# Patient Record
Sex: MALE | Race: OTHER | HISPANIC OR LATINO
[De-identification: names, ages, dates, MRNs, and addresses within clinical notes are randomized per-mention and may not be internally consistent; named-entity substitution may affect disease eponyms.]

---

## 2017-10-26 ENCOUNTER — TRANSCRIPTION ENCOUNTER (OUTPATIENT)
Age: 27
End: 2017-10-26

## 2021-03-22 PROBLEM — Z00.00 ENCOUNTER FOR PREVENTIVE HEALTH EXAMINATION: Status: ACTIVE | Noted: 2021-03-22

## 2021-03-23 ENCOUNTER — APPOINTMENT (OUTPATIENT)
Dept: SURGERY | Facility: CLINIC | Age: 31
End: 2021-03-23

## 2023-02-27 ENCOUNTER — INPATIENT (INPATIENT)
Facility: HOSPITAL | Age: 33
LOS: 10 days | Discharge: LEFT BEFORE DISCHARGE | DRG: 20 | End: 2023-03-10
Attending: NEUROLOGICAL SURGERY | Admitting: NEUROLOGICAL SURGERY
Payer: COMMERCIAL

## 2023-02-27 VITALS
SYSTOLIC BLOOD PRESSURE: 122 MMHG | RESPIRATION RATE: 18 BRPM | HEART RATE: 72 BPM | OXYGEN SATURATION: 96 % | DIASTOLIC BLOOD PRESSURE: 60 MMHG

## 2023-02-27 DIAGNOSIS — I67.1 CEREBRAL ANEURYSM, NONRUPTURED: ICD-10-CM

## 2023-02-27 LAB
APTT BLD: 31.6 SEC — SIGNIFICANT CHANGE UP (ref 27.5–35.5)
HCT VFR BLD CALC: 42.3 % — SIGNIFICANT CHANGE UP (ref 39–50)
HGB BLD-MCNC: 14.6 G/DL — SIGNIFICANT CHANGE UP (ref 13–17)
INR BLD: 1.07 — SIGNIFICANT CHANGE UP (ref 0.88–1.16)
MCHC RBC-ENTMCNC: 31.5 PG — SIGNIFICANT CHANGE UP (ref 27–34)
MCHC RBC-ENTMCNC: 34.5 GM/DL — SIGNIFICANT CHANGE UP (ref 32–36)
MCV RBC AUTO: 91.4 FL — SIGNIFICANT CHANGE UP (ref 80–100)
NRBC # BLD: 0 /100 WBCS — SIGNIFICANT CHANGE UP (ref 0–0)
PLATELET # BLD AUTO: 227 K/UL — SIGNIFICANT CHANGE UP (ref 150–400)
PROTHROM AB SERPL-ACNC: 12.7 SEC — SIGNIFICANT CHANGE UP (ref 10.5–13.4)
RBC # BLD: 4.63 M/UL — SIGNIFICANT CHANGE UP (ref 4.2–5.8)
RBC # FLD: 14.3 % — SIGNIFICANT CHANGE UP (ref 10.3–14.5)
WBC # BLD: 12.21 K/UL — HIGH (ref 3.8–10.5)
WBC # FLD AUTO: 12.21 K/UL — HIGH (ref 3.8–10.5)

## 2023-02-27 PROCEDURE — 99222 1ST HOSP IP/OBS MODERATE 55: CPT

## 2023-02-27 PROCEDURE — 71045 X-RAY EXAM CHEST 1 VIEW: CPT | Mod: 26

## 2023-02-27 RX ORDER — ACETAMINOPHEN 500 MG
650 TABLET ORAL EVERY 6 HOURS
Refills: 0 | Status: DISCONTINUED | OUTPATIENT
Start: 2023-02-27 | End: 2023-03-02

## 2023-02-27 RX ORDER — ONDANSETRON 8 MG/1
4 TABLET, FILM COATED ORAL EVERY 6 HOURS
Refills: 0 | Status: DISCONTINUED | OUTPATIENT
Start: 2023-02-27 | End: 2023-03-10

## 2023-02-27 RX ORDER — SODIUM CHLORIDE 9 MG/ML
1000 INJECTION INTRAMUSCULAR; INTRAVENOUS; SUBCUTANEOUS
Refills: 0 | Status: DISCONTINUED | OUTPATIENT
Start: 2023-02-27 | End: 2023-02-28

## 2023-02-27 RX ORDER — SENNA PLUS 8.6 MG/1
2 TABLET ORAL AT BEDTIME
Refills: 0 | Status: DISCONTINUED | OUTPATIENT
Start: 2023-02-27 | End: 2023-03-10

## 2023-02-27 RX ADMIN — SENNA PLUS 2 TABLET(S): 8.6 TABLET ORAL at 23:59

## 2023-02-27 NOTE — H&P ADULT - HISTORY OF PRESENT ILLNESS
Patient is a 31yo M with PMHx of anxiety who originally presented to [facility] with severe headache, nausea with emesis x 1 and diaphoresis and was found to have a right 6mm ICA aneurysm on CTA. Patient reports that this morning he woke up with a severe headache that was accompanied by an episode of nausea and vomiting. He also endorses that the headache was so severe he became extremely anxious, felt as if he was having a panic attack and became diaphoretic. He states that headaches are controlled from the pain medicine he received while at [place] and did not have any additional episodes of nausea/vomiting. Pending further workup with repeat CTH/CTA and possible cerebral angiogram     Denies visual changes, dizziness, cigarette smoking, family hx of brain aneurysm, previous recurrent headaches, changes in sensation, mental status, unilateral weakness, confusion. Patient is a 33yo M with PMHx of anxiety who originally presented to [facility] with severe headache, nausea with emesis x 1 and diaphoresis and was found to have a right 6mm ICA aneurysm on CTA. Patient reports that this morning he woke up with a severe headache that was accompanied by an episode of nausea and vomiting. He also endorses that the headache was so severe he became extremely anxious, felt as if he was having a panic attack and became diaphoretic. He states that headaches are controlled from the pain medicine he received while at [place] and did not have any additional episodes of nausea/vomiting. Pending further workup with repeat CTH/CTA and possible cerebral angiogram     Denies visual changes, dizziness, cigarette smoking, family hx of brain aneurysm, previous recurrent headaches, changes in sensation, mental status, unilateral weakness, confusion.    GCS 15, MF0,  HH 1

## 2023-02-27 NOTE — H&P ADULT - ASSESSMENT
Patient is a 31yo M with PMHx of anxiety who originally presented to [facility] with severe headache, nausea with emesis x 1 and diaphoresis and was found to have a right 6mm ICA aneurysm on CTA, now admitted for further workup and possible cerebral angiogram.

## 2023-02-27 NOTE — H&P ADULT - NSHPREVIEWOFSYSTEMS_GEN_ALL_CORE
CONSTITUTIONAL:  No night sweats, fatigue, fever or chills.  HEENT:  Eyes:  No visual changes, eye pain, eye discharge.  +headache,   ENT:    No sinus pain, sore throat, odynophagia, ear pain, congestion.  RESPIRATORY: No wheezing, SOB, cough.   CARDIOVASCULAR:  No chest pains, palpitations.  GASTROINTESTINAL:  No abdominal pain, diarrhea, constipation.  +nausea/vomiting  GENITOURINARY:  No urgency, frequency, dysuria, hematuria.    MUSCULOSKELETAL:  No musculoskeletal pain, joint swelling.    SKIN:  No rashes, lesions, wounds.  HEMATOLOGIC:  No purpura, petechiae.     ALLERGIC AND IMMUNOLOGIC:  No pruritus, no pitting edema.

## 2023-02-27 NOTE — H&P ADULT - NSHPPHYSICALEXAM_GEN_ALL_CORE
General: NAD, pt is comfortably sitting up in bed, on room air  HEENT: CN II-XII grossly intact, PERRL 3mm briskly reactive, EOMI b/l, face symmetric, tongue midline, neck FROM  Cardiovascular: RRR, normal S1 and S2   Respiratory: lungs CTAB, no wheezing, rhonchi, or crackles   GI: normoactive BS to auscultation, abd soft, NTND   Neuro: A&Ox3, No aphasia, speech clear, no dysmetria, no pronator drift. Follows commands.  ARMSTRONG x4 spontaneously, 5/5 strength in all extremities throughout. SILT throughout   Extremities: warm and well perfused.

## 2023-02-27 NOTE — H&P ADULT - NS ATTEND AMEND GEN_ALL_CORE FT
Patient with severe headache and CTA showing cerebral aneurysm. No hemorrhage reported on CT head. Needs LP for assessment of xanthochromia and catheter cerebral angiogram.    Watson Wiseman M.D.

## 2023-02-27 NOTE — H&P ADULT - PROBLEM SELECTOR PLAN 1
- Admit to telemetry  - Neuro/vitals q 4 hours  - Pain control PRN  - CTH and CTA head/neck pending  - SBP <140  - Possible cerebral angiogram     Assessment and plan discussed with Dr. Wiseman - Admit to telemetry  - Neuro/vitals q 4 hours  - Pain control PRN  - CTH and CTA head/neck  -LP if CT head negative for blood  - SBP <140  - Catheter cerebral angiogram     Assessment and plan discussed with Dr. Wiseman

## 2023-02-28 ENCOUNTER — TRANSCRIPTION ENCOUNTER (OUTPATIENT)
Age: 33
End: 2023-02-28

## 2023-02-28 ENCOUNTER — APPOINTMENT (OUTPATIENT)
Dept: NEUROSURGERY | Facility: HOSPITAL | Age: 33
End: 2023-02-28

## 2023-02-28 DIAGNOSIS — D72.829 ELEVATED WHITE BLOOD CELL COUNT, UNSPECIFIED: ICD-10-CM

## 2023-02-28 DIAGNOSIS — Z01.818 ENCOUNTER FOR OTHER PREPROCEDURAL EXAMINATION: ICD-10-CM

## 2023-02-28 DIAGNOSIS — F41.9 ANXIETY DISORDER, UNSPECIFIED: ICD-10-CM

## 2023-02-28 LAB
ALBUMIN SERPL ELPH-MCNC: 4.1 G/DL — SIGNIFICANT CHANGE UP (ref 3.3–5)
ALP SERPL-CCNC: 93 U/L — SIGNIFICANT CHANGE UP (ref 40–120)
ALT FLD-CCNC: 12 U/L — SIGNIFICANT CHANGE UP (ref 10–45)
ANION GAP SERPL CALC-SCNC: 10 MMOL/L — SIGNIFICANT CHANGE UP (ref 5–17)
ANION GAP SERPL CALC-SCNC: 11 MMOL/L — SIGNIFICANT CHANGE UP (ref 5–17)
ANION GAP SERPL CALC-SCNC: 8 MMOL/L — SIGNIFICANT CHANGE UP (ref 5–17)
APPEARANCE CSF: SIGNIFICANT CHANGE UP
APPEARANCE SPUN FLD: COLORLESS — SIGNIFICANT CHANGE UP
APTT BLD: 26.8 SEC — LOW (ref 27.5–35.5)
AST SERPL-CCNC: 12 U/L — SIGNIFICANT CHANGE UP (ref 10–40)
BILIRUB SERPL-MCNC: 0.8 MG/DL — SIGNIFICANT CHANGE UP (ref 0.2–1.2)
BLD GP AB SCN SERPL QL: NEGATIVE — SIGNIFICANT CHANGE UP
BLD GP AB SCN SERPL QL: NEGATIVE — SIGNIFICANT CHANGE UP
BUN SERPL-MCNC: 13 MG/DL — SIGNIFICANT CHANGE UP (ref 7–23)
CALCIUM SERPL-MCNC: 8.2 MG/DL — LOW (ref 8.4–10.5)
CALCIUM SERPL-MCNC: 9.1 MG/DL — SIGNIFICANT CHANGE UP (ref 8.4–10.5)
CALCIUM SERPL-MCNC: 9.3 MG/DL — SIGNIFICANT CHANGE UP (ref 8.4–10.5)
CHLORIDE SERPL-SCNC: 104 MMOL/L — SIGNIFICANT CHANGE UP (ref 96–108)
CHLORIDE SERPL-SCNC: 104 MMOL/L — SIGNIFICANT CHANGE UP (ref 96–108)
CHLORIDE SERPL-SCNC: 107 MMOL/L — SIGNIFICANT CHANGE UP (ref 96–108)
CO2 SERPL-SCNC: 23 MMOL/L — SIGNIFICANT CHANGE UP (ref 22–31)
CO2 SERPL-SCNC: 24 MMOL/L — SIGNIFICANT CHANGE UP (ref 22–31)
CO2 SERPL-SCNC: 24 MMOL/L — SIGNIFICANT CHANGE UP (ref 22–31)
COLOR CSF: ABNORMAL
CREAT SERPL-MCNC: 0.85 MG/DL — SIGNIFICANT CHANGE UP (ref 0.5–1.3)
CREAT SERPL-MCNC: 0.85 MG/DL — SIGNIFICANT CHANGE UP (ref 0.5–1.3)
CREAT SERPL-MCNC: 0.88 MG/DL — SIGNIFICANT CHANGE UP (ref 0.5–1.3)
CSF COMMENTS: SIGNIFICANT CHANGE UP
EGFR: 117 ML/MIN/1.73M2 — SIGNIFICANT CHANGE UP
EGFR: 118 ML/MIN/1.73M2 — SIGNIFICANT CHANGE UP
EGFR: 118 ML/MIN/1.73M2 — SIGNIFICANT CHANGE UP
GLUCOSE CSF-MCNC: 66 MG/DL — SIGNIFICANT CHANGE UP (ref 40–70)
GLUCOSE SERPL-MCNC: 107 MG/DL — HIGH (ref 70–99)
GLUCOSE SERPL-MCNC: 109 MG/DL — HIGH (ref 70–99)
GLUCOSE SERPL-MCNC: 112 MG/DL — HIGH (ref 70–99)
GRAM STN FLD: SIGNIFICANT CHANGE UP
HCT VFR BLD CALC: 40.6 % — SIGNIFICANT CHANGE UP (ref 39–50)
HCT VFR BLD CALC: 42.5 % — SIGNIFICANT CHANGE UP (ref 39–50)
HGB BLD-MCNC: 13.8 G/DL — SIGNIFICANT CHANGE UP (ref 13–17)
HGB BLD-MCNC: 14.5 G/DL — SIGNIFICANT CHANGE UP (ref 13–17)
INR BLD: 1.18 — HIGH (ref 0.88–1.16)
LACTATE CSF-MCNC: 2 MMOL/L — SIGNIFICANT CHANGE UP (ref 1.1–2.4)
LYMPHOCYTES # CSF: 1 % — LOW (ref 40–80)
LYMPHOCYTES # CSF: 3 % — LOW (ref 40–80)
LYMPHOCYTES # CSF: 5 % — SIGNIFICANT CHANGE UP (ref 40–80)
LYMPHOCYTES # CSF: 6 % — LOW (ref 40–80)
MAGNESIUM SERPL-MCNC: 1.7 MG/DL — SIGNIFICANT CHANGE UP (ref 1.6–2.6)
MAGNESIUM SERPL-MCNC: 1.9 MG/DL — SIGNIFICANT CHANGE UP (ref 1.6–2.6)
MAGNESIUM SERPL-MCNC: 2.4 MG/DL — SIGNIFICANT CHANGE UP (ref 1.6–2.6)
MCHC RBC-ENTMCNC: 31.3 PG — SIGNIFICANT CHANGE UP (ref 27–34)
MCHC RBC-ENTMCNC: 31.4 PG — SIGNIFICANT CHANGE UP (ref 27–34)
MCHC RBC-ENTMCNC: 34 GM/DL — SIGNIFICANT CHANGE UP (ref 32–36)
MCHC RBC-ENTMCNC: 34.1 GM/DL — SIGNIFICANT CHANGE UP (ref 32–36)
MCV RBC AUTO: 91.8 FL — SIGNIFICANT CHANGE UP (ref 80–100)
MCV RBC AUTO: 92.5 FL — SIGNIFICANT CHANGE UP (ref 80–100)
MONOS+MACROS NFR CSF: 1 % — LOW (ref 15–45)
MONOS+MACROS NFR CSF: 1 % — LOW (ref 15–45)
MONOS+MACROS NFR CSF: 2 % — SIGNIFICANT CHANGE UP (ref 15–45)
NEUTROPHILS # CSF: 15 % — HIGH (ref 0–6)
NEUTROPHILS # CSF: 15 % — SIGNIFICANT CHANGE UP (ref 0–6)
NEUTROPHILS # CSF: 8 % — HIGH (ref 0–6)
NEUTROPHILS # CSF: 9 % — HIGH (ref 0–6)
NRBC # BLD: 0 /100 WBCS — SIGNIFICANT CHANGE UP (ref 0–0)
NRBC # BLD: 0 /100 WBCS — SIGNIFICANT CHANGE UP (ref 0–0)
NRBC NFR CSF: 11 /UL — HIGH (ref 0–5)
NRBC NFR CSF: 11 /UL — HIGH (ref 0–5)
NRBC NFR CSF: 22 /UL — HIGH (ref 0–5)
NRBC NFR CSF: 22 /UL — HIGH (ref 0–5)
PHOSPHATE SERPL-MCNC: 2.5 MG/DL — SIGNIFICANT CHANGE UP (ref 2.5–4.5)
PHOSPHATE SERPL-MCNC: 2.8 MG/DL — SIGNIFICANT CHANGE UP (ref 2.5–4.5)
PHOSPHATE SERPL-MCNC: 2.9 MG/DL — SIGNIFICANT CHANGE UP (ref 2.5–4.5)
PLATELET # BLD AUTO: 229 K/UL — SIGNIFICANT CHANGE UP (ref 150–400)
PLATELET # BLD AUTO: 234 K/UL — SIGNIFICANT CHANGE UP (ref 150–400)
POTASSIUM SERPL-MCNC: 3.8 MMOL/L — SIGNIFICANT CHANGE UP (ref 3.5–5.3)
POTASSIUM SERPL-MCNC: 4 MMOL/L — SIGNIFICANT CHANGE UP (ref 3.5–5.3)
POTASSIUM SERPL-MCNC: 4.2 MMOL/L — SIGNIFICANT CHANGE UP (ref 3.5–5.3)
POTASSIUM SERPL-SCNC: 3.8 MMOL/L — SIGNIFICANT CHANGE UP (ref 3.5–5.3)
POTASSIUM SERPL-SCNC: 4 MMOL/L — SIGNIFICANT CHANGE UP (ref 3.5–5.3)
POTASSIUM SERPL-SCNC: 4.2 MMOL/L — SIGNIFICANT CHANGE UP (ref 3.5–5.3)
PROT CSF-MCNC: 87 MG/DL — HIGH (ref 15–45)
PROT SERPL-MCNC: 6.4 G/DL — SIGNIFICANT CHANGE UP (ref 6–8.3)
PROTHROM AB SERPL-ACNC: 14.1 SEC — HIGH (ref 10.5–13.4)
RBC # BLD: 4.39 M/UL — SIGNIFICANT CHANGE UP (ref 4.2–5.8)
RBC # BLD: 4.63 M/UL — SIGNIFICANT CHANGE UP (ref 4.2–5.8)
RBC # CSF: HIGH /UL (ref 0–0)
RBC # FLD: 14.3 % — SIGNIFICANT CHANGE UP (ref 10.3–14.5)
RBC # FLD: 14.4 % — SIGNIFICANT CHANGE UP (ref 10.3–14.5)
RH IG SCN BLD-IMP: POSITIVE — SIGNIFICANT CHANGE UP
RH IG SCN BLD-IMP: POSITIVE — SIGNIFICANT CHANGE UP
SARS-COV-2 RNA SPEC QL NAA+PROBE: SIGNIFICANT CHANGE UP
SODIUM SERPL-SCNC: 138 MMOL/L — SIGNIFICANT CHANGE UP (ref 135–145)
SODIUM SERPL-SCNC: 138 MMOL/L — SIGNIFICANT CHANGE UP (ref 135–145)
SODIUM SERPL-SCNC: 139 MMOL/L — SIGNIFICANT CHANGE UP (ref 135–145)
SPECIMEN SOURCE: SIGNIFICANT CHANGE UP
TUBE TYPE: SIGNIFICANT CHANGE UP
WBC # BLD: 10.81 K/UL — HIGH (ref 3.8–10.5)
WBC # BLD: 8.51 K/UL — SIGNIFICANT CHANGE UP (ref 3.8–10.5)
WBC # FLD AUTO: 10.81 K/UL — HIGH (ref 3.8–10.5)
WBC # FLD AUTO: 8.51 K/UL — SIGNIFICANT CHANGE UP (ref 3.8–10.5)

## 2023-02-28 PROCEDURE — 62270 DX LMBR SPI PNXR: CPT

## 2023-02-28 PROCEDURE — 36227 PLACE CATH XTRNL CAROTID: CPT | Mod: LT

## 2023-02-28 PROCEDURE — 99291 CRITICAL CARE FIRST HOUR: CPT

## 2023-02-28 PROCEDURE — 99223 1ST HOSP IP/OBS HIGH 75: CPT

## 2023-02-28 PROCEDURE — 76377 3D RENDER W/INTRP POSTPROCES: CPT | Mod: 26

## 2023-02-28 PROCEDURE — 70498 CT ANGIOGRAPHY NECK: CPT | Mod: 26

## 2023-02-28 PROCEDURE — 36224 PLACE CATH CAROTD ART: CPT | Mod: 50

## 2023-02-28 PROCEDURE — 99232 SBSQ HOSP IP/OBS MODERATE 35: CPT | Mod: 25

## 2023-02-28 PROCEDURE — 36226 PLACE CATH VERTEBRAL ART: CPT | Mod: LT

## 2023-02-28 PROCEDURE — 70450 CT HEAD/BRAIN W/O DYE: CPT | Mod: 26,59

## 2023-02-28 PROCEDURE — 70496 CT ANGIOGRAPHY HEAD: CPT | Mod: 26

## 2023-02-28 RX ORDER — INFLUENZA VIRUS VACCINE 15; 15; 15; 15 UG/.5ML; UG/.5ML; UG/.5ML; UG/.5ML
0.5 SUSPENSION INTRAMUSCULAR ONCE
Refills: 0 | Status: DISCONTINUED | OUTPATIENT
Start: 2023-02-28 | End: 2023-03-10

## 2023-02-28 RX ORDER — SODIUM CHLORIDE 9 MG/ML
1000 INJECTION INTRAMUSCULAR; INTRAVENOUS; SUBCUTANEOUS
Refills: 0 | Status: DISCONTINUED | OUTPATIENT
Start: 2023-02-28 | End: 2023-03-03

## 2023-02-28 RX ORDER — POVIDONE-IODINE 5 %
1 AEROSOL (ML) TOPICAL ONCE
Refills: 0 | Status: COMPLETED | OUTPATIENT
Start: 2023-03-01 | End: 2023-03-01

## 2023-02-28 RX ORDER — FENTANYL CITRATE 50 UG/ML
50 INJECTION INTRAVENOUS ONCE
Refills: 0 | Status: DISCONTINUED | OUTPATIENT
Start: 2023-02-28 | End: 2023-02-28

## 2023-02-28 RX ORDER — CHLORHEXIDINE GLUCONATE 213 G/1000ML
1 SOLUTION TOPICAL
Refills: 0 | Status: DISCONTINUED | OUTPATIENT
Start: 2023-02-28 | End: 2023-03-09

## 2023-02-28 RX ORDER — SODIUM CHLORIDE 9 MG/ML
1000 INJECTION INTRAMUSCULAR; INTRAVENOUS; SUBCUTANEOUS
Refills: 0 | Status: DISCONTINUED | OUTPATIENT
Start: 2023-02-28 | End: 2023-02-28

## 2023-02-28 RX ORDER — MAGNESIUM SULFATE 500 MG/ML
2 VIAL (ML) INJECTION ONCE
Refills: 0 | Status: COMPLETED | OUTPATIENT
Start: 2023-02-28 | End: 2023-02-28

## 2023-02-28 RX ORDER — MAGNESIUM SULFATE 500 MG/ML
1 VIAL (ML) INJECTION ONCE
Refills: 0 | Status: COMPLETED | OUTPATIENT
Start: 2023-02-28 | End: 2023-02-28

## 2023-02-28 RX ORDER — SODIUM,POTASSIUM PHOSPHATES 278-250MG
1 POWDER IN PACKET (EA) ORAL ONCE
Refills: 0 | Status: COMPLETED | OUTPATIENT
Start: 2023-02-28 | End: 2023-02-28

## 2023-02-28 RX ORDER — LIDOCAINE HCL 20 MG/ML
30 VIAL (ML) INJECTION ONCE
Refills: 0 | Status: DISCONTINUED | OUTPATIENT
Start: 2023-02-28 | End: 2023-02-28

## 2023-02-28 RX ORDER — POTASSIUM CHLORIDE 20 MEQ
40 PACKET (EA) ORAL ONCE
Refills: 0 | Status: COMPLETED | OUTPATIENT
Start: 2023-02-28 | End: 2023-02-28

## 2023-02-28 RX ADMIN — Medication 1 PACKET(S): at 02:14

## 2023-02-28 RX ADMIN — Medication 25 GRAM(S): at 02:16

## 2023-02-28 RX ADMIN — Medication 40 MILLIEQUIVALENT(S): at 02:16

## 2023-02-28 RX ADMIN — Medication 100 GRAM(S): at 18:36

## 2023-02-28 NOTE — PATIENT PROFILE ADULT - FALL HARM RISK - HARM RISK INTERVENTIONS

## 2023-02-28 NOTE — PATIENT PROFILE ADULT - HAS THE PATIENT RECEIVED THE INFLUENZA VACCINE THIS SEASON?
[With Biopsy] : with biopsy [With Cautery] : cautery [With Snare Polypectomy] : snare polypectomy [Hematochezia] : hematochezia [Change in Bowel Habits] : change in bowel habits [Allergies Reviewed] : allergies reviewed. [Procedure Explained] : The procedure was explained [Risks] : Risks [Alternatives] : alternatives [Benefits] : benefits [Bleeding] : bleeding risk [Infection] : risk of infection [Consent Obtained] : written consent was obtained prior to the procedure and is detailed in the patient's record [Patient] : the patient [Bowel Prep Kit] : the patient took the appropriate bowel preparation kit as directed [Approved Diet Followed] : the patient avoided solid foods and adhered to the approved diet list for 24 hours prior to the procedure [Automated Blood Pressure Cuff] : automated blood pressure cuff [Cardiac Monitor] : cardiac monitor [Propofol ___ mg IV] : Propofol [unfilled] ~Umg intravenously [Pulse Oximeter] : pulse oximeter [2] : 2 [Sedation Clearance] : the patient was cleared for moderate sedation [Prep Qualtiy: ___] : Prep Quality:  [unfilled] [Time started: ___] : Start Time:  [unfilled] [Withdrawal Time: ___] : Withdrawal Time:  [unfilled] [Time Completed: ___] : Completion Time:  [unfilled] [Performed By: ___] : Performed by:  DI [de-identified] : Decreased sphincter tone no...

## 2023-02-28 NOTE — PROGRESS NOTE ADULT - PROBLEM SELECTOR PLAN 3
RCRI 0, Class I, 3.9% CV Risk   Mets>4  Medically optimized for planned angiogram without further CV testing

## 2023-02-28 NOTE — PROGRESS NOTE ADULT - SUBJECTIVE AND OBJECTIVE BOX
NEUROSURGERY POST OP NOTE:    POD# 0 S/P cerebral angiogram with findings of 7.8mm R Pcomm aneurysm. Aneurysm not coiled.     S: Postop patient denies any acute complaints. Denies headache or nausea.      T(C): 36.2 (02-28-23 @ 14:30), Max: 36.7 (02-28-23 @ 04:39)  HR: 74 (02-28-23 @ 17:00) (58 - 80)  BP: 102/60 (02-28-23 @ 17:00) (96/59 - 122/60)  RR: 21 (02-28-23 @ 17:00) (11 - 25)  SpO2: 97% (02-28-23 @ 17:00) (95% - 100%)      02-27-23 @ 07:01  -  02-28-23 @ 07:00  --------------------------------------------------------  IN: 300 mL / OUT: 0 mL / NET: 300 mL    02-28-23 @ 07:01  -  02-28-23 @ 17:52  --------------------------------------------------------  IN: 640 mL / OUT: 0 mL / NET: 640 mL        acetaminophen     Tablet .. 650 milliGRAM(s) Oral every 6 hours PRN  influenza   Vaccine 0.5 milliLiter(s) IntraMuscular once  lidocaine 2% Injectable 30 milliLiter(s) Local Injection once  ondansetron Injectable 4 milliGRAM(s) IV Push every 6 hours PRN  senna 2 Tablet(s) Oral at bedtime  sodium chloride 0.9%. 1000 milliLiter(s) IV Continuous <Continuous>      RADIOLOGY:     Exam:  General: patient seen laying supine in bed in NAD  Neuro: AAOx3, follows commands, opens eyes spontaneously, speech clear and fluent, CNII-XI grossly intact, face symmetric, no pronator drift, strength 5/5 b/l upper extremities and lower extremities except for proximal right lower extremity not tested, sensation intact to light touch throughout  HEENT: PERRL, EOMI  Neck: supple  Cardiac: RRR, S1S2  Pulmonary: chest rise symmetric  Abdomen: soft, nontender, nondistended  Ext: perfusing well, PT/DP pulses 2+ b/l   Skin: warm, dry  Wound: R groin puncture dressing c/d/i      ASSESSMENT:  Patient is a 31yo M with PMHx of anxiety who originally presented to [facility] with severe headache, nausea with emesis x 1 and diaphoresis and was found to have a right 6mm ICA aneurysm on CTA, admitted, now s/p lumbar puncture 2/28 and s/p cerebral angiogram with findings of 7.8mm R Pcomm aneurysm 2/28. preop for R crani for aneurysm clipping 2/29.      PLAN  NEURO  - Neuro/vitals q 1 hours  - Pain control PRN  - CTH and CTA head/neck completed  - preop for R crani for aneurysm clipping 2/29    PULM  - Satting well on room air     CARDIO  - SBP <140    GI  - ADAT, NPO after midnight  - bowel regimen     ENDO  - no active issues    RENAL  - IVF @ 75/hr while NPO  - electrolyte repletion PRN   - voiding     HEME  - SCDs  - LE dopplers pending     ID  - Afebrile    Dispo: ICU status, full code, dispo pending   Assessment and plan discussed with Dr. Wiseman and Dr. Gomez      Assessment:  Present when checked    []  GCS  E   V  M     Heart Failure: []Acute, [] acute on chronic , []chronic  Heart Failure:  [] Diastolic (HFpEF), [] Systolic (HFrEF), []Combined (HFpEF and HFrEF), [] RHF, [] Pulm HTN, [] Other    [] EDISON, [] ATN, [] AIN, [] other  [] CKD1, [] CKD2, [] CKD 3, [] CKD 4, [] CKD 5, []ESRD    Encephalopathy: [] Metabolic, [] Hepatic, [] toxic, [] Neurological, [] Other    Abnormal Nurtitional Status: [] malnurtition (see nutrition note), [ ]underweight: BMI < 19, [] morbid obesity: BMI >40, [] Cachexia    [] Sepsis  [] hypovolemic shock,[] cardiogenic shock, [] hemorrhagic shock, [] neuogenic shock  [] Acute Respiratory Failure  []Cerebral edema, [] Brain compression/ herniation,   [] Functional quadriplegia  [] Acute blood loss anemia

## 2023-02-28 NOTE — PROGRESS NOTE ADULT - SUBJECTIVE AND OBJECTIVE BOX
INTERVAL HISTORY: HPI:  Patient is a 31yo M with PMHx of anxiety who originally presented to [facility] with severe headache, nausea with emesis x 1 and diaphoresis and was found to have a right 6mm ICA aneurysm on CTA. Patient reports that this morning he woke up with a severe headache that was accompanied by an episode of nausea and vomiting. He also endorses that the headache was so severe he became extremely anxious, felt as if he was having a panic attack and became diaphoretic. He states that headaches are controlled from the pain medicine he received while at [place] and did not have any additional episodes of nausea/vomiting. Pending further workup with repeat CTH/CTA and possible cerebral angiogram     Denies visual changes, dizziness, cigarette smoking, family hx of brain aneurysm, previous recurrent headaches, changes in sensation, mental status, unilateral weakness, confusion. (27 Feb 2023 23:14)      MEDICATIONS  (STANDING):  influenza   Vaccine 0.5 milliLiter(s) IntraMuscular once  lidocaine 2% Injectable 30 milliLiter(s) Local Injection once  senna 2 Tablet(s) Oral at bedtime  sodium chloride 0.9%. 1000 milliLiter(s) (100 mL/Hr) IV Continuous <Continuous>    MEDICATIONS  (PRN):  acetaminophen     Tablet .. 650 milliGRAM(s) Oral every 6 hours PRN Temp greater or equal to 38.5C (101.3F), Moderate Pain (4 - 6)  ondansetron Injectable 4 milliGRAM(s) IV Push every 6 hours PRN Nausea and/or Vomiting      Drug Dosing Weight  Height (cm): 177.8 (28 Feb 2023 13:52)  Weight (kg): 95 (28 Feb 2023 13:52)  BMI (kg/m2): 30.1 (28 Feb 2023 13:52)  BSA (m2): 2.13 (28 Feb 2023 13:52)    PAST MEDICAL & SURGICAL HISTORY:  Anxiety      No significant past surgical history          REVIEW OF SYSTEMS: [ ] Unable to Assess due to neurologic exam   [ ] All ROS addressed below are non-contributory, except:  Neuro: [ ] Headache [ ] Back pain [ ] Numbness [ ] Weakness [ ] Ataxia [ ] Dizziness [ ] Aphasia [ ] Dysarthria [ ] Visual disturbance  Resp: [ ] Shortness of breath/dyspnea, [ ] Orthopnea [ ] Cough  CV: [ ] Chest pain [ ] Palpitation [ ] Lightheadedness [ ] Syncope  Renal: [ ] Thirst [ ] Edema  GI: [ ] Nausea [ ] Emesis [ ] Abdominal pain [ ] Constipation [ ] Diarrhea  Hem: [ ] Hematemesis [ ] bright red blood per rectum  ID: [ ] Fever [ ] Chills [ ] Dysuria  ENT: [ ] Rhinorrhea    PHYSICAL EXAM:    General: No Acute Distress     Neurological: Awake, alert oriented to person, place and time, Following Commands, PERRL, EOMI, Face Symmetrical, Speech Fluent, Moving all extremities, Muscle Strength normal in all four extremities, No Drift, Sensation to Light Touch Intact    Pulmonary: Clear to Auscultation, No Rales, No Rhonchi, No Wheezes     Cardiovascular: S1, S2, Regular Rate and Rhythm     Gastrointestinal: Soft, Nontender, Nondistended     Extremities: No calf tenderness     Incision:     ICU Vital Signs Last 24 Hrs  T(C): 36.2 (28 Feb 2023 14:30), Max: 36.7 (28 Feb 2023 04:39)  T(F): 97.2 (28 Feb 2023 14:30), Max: 98 (28 Feb 2023 04:39)  HR: 60 (28 Feb 2023 15:30) (58 - 80)  BP: 101/67 (28 Feb 2023 15:30) (96/59 - 122/60)  BP(mean): 79 (28 Feb 2023 15:30) (73 - 87)  ABP: 119/60 (28 Feb 2023 15:30) (115/60 - 131/66)  ABP(mean): 79 (28 Feb 2023 15:30) (79 - 92)  RR: 11 (28 Feb 2023 15:30) (11 - 21)  SpO2: 98% (28 Feb 2023 15:30) (95% - 100%)    O2 Parameters below as of 28 Feb 2023 15:30  Patient On (Oxygen Delivery Method): room air            I&O's Detail    27 Feb 2023 07:01  -  28 Feb 2023 07:00  --------------------------------------------------------  IN:    sodium chloride 0.9%: 300 mL  Total IN: 300 mL    OUT:  Total OUT: 0 mL    Total NET: 300 mL      28 Feb 2023 07:01  -  28 Feb 2023 15:46  --------------------------------------------------------  IN:    Oral Fluid: 115 mL    sodium chloride 0.9%: 200 mL    sodium chloride 0.9%: 225 mL  Total IN: 540 mL    OUT:  Total OUT: 0 mL    Total NET: 540 mL              LABS:  CBC Full  -  ( 28 Feb 2023 14:36 )  WBC Count : 8.51 K/uL  RBC Count : 4.39 M/uL  Hemoglobin : 13.8 g/dL  Hematocrit : 40.6 %  Platelet Count - Automated : 229 K/uL  Mean Cell Volume : 92.5 fl  Mean Cell Hemoglobin : 31.4 pg  Mean Cell Hemoglobin Concentration : 34.0 gm/dL  Auto Neutrophil # : x  Auto Lymphocyte # : x  Auto Monocyte # : x  Auto Eosinophil # : x  Auto Basophil # : x  Auto Neutrophil % : x  Auto Lymphocyte % : x  Auto Monocyte % : x  Auto Eosinophil % : x  Auto Basophil % : x    02-28    139  |  107  |  13  ----------------------------<  109<H>  4.2   |  24  |  0.88    Ca    8.2<L>      28 Feb 2023 14:36  Phos  2.8     02-28  Mg     1.9     02-28    TPro  6.4  /  Alb  4.1  /  TBili  0.8  /  DBili  x   /  AST  12  /  ALT  12  /  AlkPhos  93  02-27    PT/INR - ( 28 Feb 2023 14:36 )   PT: 14.1 sec;   INR: 1.18          PTT - ( 28 Feb 2023 14:36 )  PTT:26.8 sec      RADIOLOGY & ADDITIONAL STUDIES:  < from: CT Angio Neck w/ IV Cont (02.28.23 @ 01:15) >  Intracranial CTA: 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    1 mm superiorly directed outpouching of the paraclinoid right ICA which   may represent origin of the ophthalmic artery versus small aneurysm..    Extracranial CTA: No steno-occlusive disease.    < end of copied text >

## 2023-02-28 NOTE — PROGRESS NOTE ADULT - ASSESSMENT
Assessment: 32M no significant pmhx txfer for DSA after WHOL CTA identified unruptured supraclinoid R ICA aneurysm s/p DSA w/o intervention pending open crani for clipping 3/1    NEURO:  unruptured cerebral aneurysem s/p DSA pending OR crani clipping tomorrow   s/p LP w/ trumatic tap   -neuro check q1  -pain management w/ tylenol & oxycodone  -PT/OT evaluation    PULMONARY:  saturating well on RA,   -continue to monitor on pulse o2   -incentive spirometry 10q/hr when awake     CARDIOVASCULAR:  monitor on telemetry   vitals q1  sbp goal 100-140    GASTROENTEROLOGY:  bedside speech & swallow if pass can start advancing diet as tolerated.   ensure BMs w/ Miralax & senna  Daily stool count, LBM prior to arrival  NPO after MN for crani/clipping    RENAL/:  -check BMP qd  -strict i/o's ;   -Na goal 135-145  c/w IVF NS @100cc/hr given s/p DSA    ENDOCRINE:  Monitor FSG q6 hrs while NPO  FSG goal 120-180    HEME/ONC:  DVT ppx: will hold chemical dvt ppx in setting of recent  procedure   b/l SCDs    INFECTIOUS:   Monitor for fevers     Patient is critically ill, requiring critical care services.     I have personally and independently provided [ 35 ] minutes of critical care services.  This excludes any time spent on separate procedures or teaching.

## 2023-02-28 NOTE — PROGRESS NOTE ADULT - SUBJECTIVE AND OBJECTIVE BOX
Surgery: Right craniotomy for clipping of cerebral aneurysm  Consent: Signed by patient    Representative Consent: [ x ] Signed by patient                                                [  ] N/A -> only for cerebral angiogram    No Known Allergies      OVERNIGHT EVENTS: POD0  cerebral angiogram with findings of 7.8mm R Pcomm aneurysm.    T(C): 36.9 (02-28-23 @ 17:57), Max: 36.9 (02-28-23 @ 17:57)  HR: 74 (02-28-23 @ 17:00) (58 - 80)  BP: 102/60 (02-28-23 @ 17:00) (96/59 - 122/60)  RR: 21 (02-28-23 @ 17:00) (11 - 25)  SpO2: 97% (02-28-23 @ 17:00) (95% - 100%)  Wt(kg): --    EXAM:  General: patient seen laying supine in bed in NAD  Neuro: AAOx3, follows commands, opens eyes spontaneously, speech clear and fluent, CNII-XI grossly intact, face symmetric, no pronator drift, strength 5/5 b/l upper extremities and lower extremities except for proximal right lower extremity not tested, sensation intact to light touch throughout  HEENT: PERRL, EOMI  Neck: supple  Cardiac: RRR, S1S2  Pulmonary: chest rise symmetric  Abdomen: soft, nontender, nondistended  Ext: perfusing well, PT/DP pulses 2+ b/l   Skin: warm, dry  Wound: R groin puncture dressing c/d/i    02-28    139  |  107  |  13  ----------------------------<  109<H>  4.2   |  24  |  0.88    Ca    8.2<L>      28 Feb 2023 14:36  Phos  2.8     02-28  Mg     1.9     02-28    TPro  6.4  /  Alb  4.1  /  TBili  0.8  /  DBili  x   /  AST  12  /  ALT  12  /  AlkPhos  93  02-27    CBC Full  -  ( 28 Feb 2023 14:36 )  WBC Count : 8.51 K/uL  RBC Count : 4.39 M/uL  Hemoglobin : 13.8 g/dL  Hematocrit : 40.6 %  Platelet Count - Automated : 229 K/uL  Mean Cell Volume : 92.5 fl  Mean Cell Hemoglobin : 31.4 pg  Mean Cell Hemoglobin Concentration : 34.0 gm/dL  Auto Neutrophil # : x  Auto Lymphocyte # : x  Auto Monocyte # : x  Auto Eosinophil # : x  Auto Basophil # : x  Auto Neutrophil % : x  Auto Lymphocyte % : x  Auto Monocyte % : x  Auto Eosinophil % : x  Auto Basophil % : x    PT/INR - ( 28 Feb 2023 14:36 )   PT: 14.1 sec;   INR: 1.18          PTT - ( 28 Feb 2023 14:36 )  PTT:26.8 sec    Pregnancy test (serum hcg for any female under 56, must be resulted day before OR): [ ] Negative Result  [ ] Positive Result  [ x] N/A : male or female>55 y/o  Type & Screen (in past 72hrs):     2 Type & Screen within 72 hours if anticipate blood need in OR:  _xY _ N     Blood ordered and on hold for OR:   [ ] No need     [ ] 1u pRBC on hold      [x ] 2u pRBC on hold    COVID swab (in past 48hrs): x_ Y  _N    CXR: no infiltrate  EKG: NSR  Medical Clearances: cleared by neurointensivist  Other Clearances:     Last dose of antiplatelet/anticoagulation drug: none    Implanted Devices (pacemaker, drug pump...etc):  []YES   [x] NO                  If yes --> EPS consulted to interrogate device: [ ] YES  [ ] NO                            If yes -->  EPS called to let them know patient going for surgery: [ ] device needs to be turned off                                                                                                                                                 [ ] magnet needs to be placed for surgery                                                                                                                                                [ ] nothing to do per EP, may proceed with cautery use in OR                                       3M nasal swab ordered?  xY  _N    Cranial surgery: Order written for hair to be shampooed night before surgery and morning before surgery  [x] yes   []no  Chlorhexidine Wipes ordered for Neck Down?  x_ Y  _ N  (twice a day if 1 day before surgery, daily for 3 days if 3 days prior, daily if in ICU)                 ASSESSMENT:  Patient is a 33yo M with PMHx of anxiety who originally presented to [facility] with severe headache, nausea with emesis x 1 and diaphoresis and was found to have a right 6mm ICA aneurysm on CTA, admitted, now s/p lumbar puncture 2/28 and s/p cerebral angiogram with findings of 7.8mm R Pcomm aneurysm 2/28. preop for R crani for aneurysm clipping 2/29.      PLAN  NEURO  - Neuro/vitals q 1 hours  - Pain control PRN  - CTH and CTA head/neck completed  - preop for R crani for aneurysm clipping 2/29    PULM  - Satting well on room air     CARDIO  - SBP <140    GI  - ADAT, NPO after midnight  - bowel regimen     ENDO  - no active issues    RENAL  - IVF @ 100/hr while NPO  - electrolyte repletion PRN   - voiding     HEME  - SCDs  - LE dopplers pending     ID  - Afebrile    Dispo: ICU status, full code, dispo pending   Assessment and plan discussed with Dr. Wiseman and Dr. Gomez      Assessment:  Present when checked    []  GCS  E   V  M     Heart Failure: []Acute, [] acute on chronic , []chronic  Heart Failure:  [] Diastolic (HFpEF), [] Systolic (HFrEF), []Combined (HFpEF and HFrEF), [] RHF, [] Pulm HTN, [] Other    [] EDISON, [] ATN, [] AIN, [] other  [] CKD1, [] CKD2, [] CKD 3, [] CKD 4, [] CKD 5, []ESRD    Encephalopathy: [] Metabolic, [] Hepatic, [] toxic, [] Neurological, [] Other    Abnormal Nurtitional Status: [] malnurtition (see nutrition note), [ ]underweight: BMI < 19, [] morbid obesity: BMI >40, [] Cachexia    [] Sepsis  [] hypovolemic shock,[] cardiogenic shock, [] hemorrhagic shock, [] neuogenic shock  [] Acute Respiratory Failure  []Cerebral edema, [] Brain compression/ herniation,   [] Functional quadriplegia  [] Acute blood loss anemia

## 2023-02-28 NOTE — BRIEF OPERATIVE NOTE - OPERATION/FINDINGS
Patient was brought to neuro angiography suite, he was placed on standard monitors, intubated by anesthesiologist.    Under general anesthesia, using a 6 fr sheath right CFA, cerebral angiogram was performed.  Left ICA, Left ECA, Left Vertebral artery and right ICA were injected and studied.  Findings: There is a 7.8 mm bilobed right Posterior communicating artery aneurysm, there is a large Pcomm emanating from the neck of the aneurysm.  No endovascular treatment was attempted.  Full report to follow.  Patient tolerated procedure well, no new neurological deficit, hemodynamically stable.  Right groin/vasc access site: Exoseal and manual compression applied, hemostasis achieved, no hematoma.  Patient was extubated and transferred to NSICU.  Above d/w: Dr. Marina

## 2023-02-28 NOTE — CHART NOTE - NSCHARTNOTEFT_GEN_A_CORE
Admitted for further workup, CTH/CTA obtained, LP done, r/o xanthochromia, traumatic LP resulting in blood in all tubes. POD0 s/p cerebral angiogram with findings of 7.8mm R Pcomm aneurysm. Plan for OR in the morning for clipping of aneurysm.

## 2023-02-28 NOTE — PROGRESS NOTE ADULT - SUBJECTIVE AND OBJECTIVE BOX
HPI:  Patient is a 31yo M with PMHx of anxiety who originally presented to [facility] with severe headache, nausea with emesis x 1 and diaphoresis and was found to have a right 6mm ICA aneurysm on CTA. Patient reports that this morning he woke up with a severe headache that was accompanied by an episode of nausea and vomiting. He also endorses that the headache was so severe he became extremely anxious, felt as if he was having a panic attack and became diaphoretic. He states that headaches are controlled from the pain medicine he received while at [place] and did not have any additional episodes of nausea/vomiting. Pending further workup with repeat CTH/CTA and possible cerebral angiogram     Denies visual changes, dizziness, cigarette smoking, family hx of brain aneurysm, previous recurrent headaches, changes in sensation, mental status, unilateral weakness, confusion. (27 Feb 2023 23:14)    OVERNIGHT EVENTS: NICK overnight, CTH/CTA obtained. Lumbar puncture performed.     HOSPITAL COURSE:  2/28: Admitted for further workup, CTH/CTA obtained, LP done, r/o xanthochromia      Vital Signs Last 24 Hrs  T(C): 36.7 (28 Feb 2023 04:39), Max: 36.7 (28 Feb 2023 04:39)  T(F): 98 (28 Feb 2023 04:39), Max: 98 (28 Feb 2023 04:39)  HR: 62 (28 Feb 2023 04:05) (62 - 72)  BP: 105/58 (28 Feb 2023 04:05) (105/58 - 122/60)  BP(mean): 75 (28 Feb 2023 04:05) (74 - 83)  RR: 18 (28 Feb 2023 04:05) (18 - 18)  SpO2: 95% (28 Feb 2023 04:05) (95% - 97%)    Parameters below as of 28 Feb 2023 04:05  Patient On (Oxygen Delivery Method): room air        I&O's Summary    27 Feb 2023 07:01  -  28 Feb 2023 07:00  --------------------------------------------------------  IN: 300 mL / OUT: 0 mL / NET: 300 mL        PHYSICAL EXAM:  General: NAD, pt is comfortably sitting up in bed, on room air  HEENT: CN II-XII grossly intact, PERRL 3mm briskly reactive, EOMI b/l, face symmetric, tongue midline, neck FROM  Cardiovascular: RRR, normal S1 and S2   Respiratory: lungs CTAB, no wheezing, rhonchi, or crackles   GI: normoactive BS to auscultation, abd soft, NTND   Neuro: A&Ox3, No aphasia, speech clear, no dysmetria, no pronator drift. Follows commands.  ARMSTRONG x4 spontaneously, 5/5 strength in all extremities throughout. SILT throughout   Extremities: warm and well perfused.     DIET:  [x] NPO  [] Mechanical  [] Tube feeds    LABS:                        14.5   10.81 )-----------( 234      ( 28 Feb 2023 05:30 )             42.5     02-28    138  |  104  |  13  ----------------------------<  112<H>  4.0   |  24  |  0.85    Ca    9.1      28 Feb 2023 05:30  Phos  2.9     02-28  Mg     2.4     02-28    TPro  6.4  /  Alb  4.1  /  TBili  0.8  /  DBili  x   /  AST  12  /  ALT  12  /  AlkPhos  93  02-27    PT/INR - ( 27 Feb 2023 23:46 )   PT: 12.7 sec;   INR: 1.07          PTT - ( 27 Feb 2023 23:46 )  PTT:31.6 sec        CAPILLARY BLOOD GLUCOSE          Drug Levels: [] N/A    CSF Analysis: [] N/A      Allergies    No Known Allergies    Intolerances      MEDICATIONS:  Antibiotics:    Neuro:  acetaminophen     Tablet .. 650 milliGRAM(s) Oral every 6 hours PRN  fentaNYL    Injectable 50 MICROGram(s) IV Push once  ondansetron Injectable 4 milliGRAM(s) IV Push every 6 hours PRN    Anticoagulation:    OTHER:  lidocaine 2% Injectable 30 milliLiter(s) Local Injection once  senna 2 Tablet(s) Oral at bedtime    IVF:  sodium chloride 0.9%. 1000 milliLiter(s) IV Continuous <Continuous>    CULTURES:    RADIOLOGY & ADDITIONAL TESTS:  < from: CT Head No Cont (02.28.23 @ 01:14) >  FINDINGS:  VENTRICLES AND SULCI: Parenchymal volume is consistent with patient age.   No hydrocephalus.  INTRA-AXIAL: No intracranial mass effect, acute hemorrhage or acute   transcortical infarct is seen.  EXTRA-AXIAL: No fluid collection is present.  VISUALIZED SINUSES: Inflammatory mucosal thickening within the right   posterior ethmoid air cell.  VISUALIZED MASTOIDS: Clear.  CALVARIUM: No acute calvarial fracture.    IMPRESSION:  No acute intracranial findings.    < end of copied text >  < from: CT Angio Head w/ IV Cont (02.28.23 @ 01:14) >    INTRACRANIAL:  The internal carotid arteries are patent at the skull base and   intracranial compartment without occlusion or high grade stenosis.    The anterior and middle cerebral arteries are patent at their 1st and 2nd   order segments, and appear symmetric caliber.    The posterior circulation shows no high grade stenosis or occlusion. The   intracranial vertebral arteries, the basilar artery and both posterior   cerebral arteries are patent. The right vertebral artery is dominant.    Extending from the right cavernous internal carotid artery there is a 0.6   x 0.4 x 0.3 cm superoposteriorly directed aneurysm (6:220).    EXTRACRANIAL:    The aortic arch is normal size and shows patency. Common origin of the   brachiocephalic and left common carotid artery (normal anatomic variant).   No significant great vessel stenosis.    Both common carotid arteries are patent to the bifurcations.    Left internal carotid artery is patent at the bifurcation without   hemodynamic significant stenosis, and otherwise widely patent up to the   skull base without stenosis or dissection.  Right internal carotid artery is patent at the bifurcation without   hemodynamic significant stenosis, and otherwise widely patent up to the   skull base without stenosis or dissection.    Vertebral arteries are patent at their origins and throughout their   course in the neck. The right side is dominant.    Few subcentimeter bilateral hypodense thyroid nodules.      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 Correlation with prior imaging   is recommended to evaluate for stability.    Extracranial CTA: No steno-occlusive disease.    < end of copied text >      ASSESSMENT:  Patient is a 31yo M with PMHx of anxiety who originally presented to [facility] with severe headache, nausea with emesis x 1 and diaphoresis and was found to have a right 6mm ICA aneurysm on CTA, now admitted for further workup and possible cerebral angiogram, now s/p lumbar puncture.       ANEURYSM    No pertinent family history in first degree relatives    Handoff    MEWS Score    Anxiety    Unruptured cerebral aneurysm    Lumbar puncture    No significant past surgical history    [email or web address]        PLAN:  NEURO  - Neuro/vitals q 4 hours  - Pain control PRN  - CTH and CTA head/neck pending  - Possible cerebral angiogram     PULM  - Satting well on room air     CARDIO  - SBP <140    GI  - NPO after midnight  - bowel regimen     ENDO  - no active issues    RENAL  - IVF @ 75/hr  - electrolyte repletion PRN   - voiding     HEME  - SCDs  - LE dopplers pending     ID  - Afebrile    Dispo: dispo pending   Assessment and plan discussed with Dr. Wiseman     Assessment:  Present when checked    []  GCS  E   V  M     Heart Failure: []Acute, [] acute on chronic , []chronic  Heart Failure:  [] Diastolic (HFpEF), [] Systolic (HFrEF), []Combined (HFpEF and HFrEF), [] RHF, [] Pulm HTN, [] Other    [] EDISON, [] ATN, [] AIN, [] other  [] CKD1, [] CKD2, [] CKD 3, [] CKD 4, [] CKD 5, []ESRD    Encephalopathy: [] Metabolic, [] Hepatic, [] toxic, [] Neurological, [] Other    Abnormal Nurtitional Status: [] malnurtition (see nutrition note), [ ]underweight: BMI < 19, [] morbid obesity: BMI >40, [] Cachexia    [] Sepsis  [] hypovolemic shock,[] cardiogenic shock, [] hemorrhagic shock, [] neuogenic shock  [] Acute Respiratory Failure  []Cerebral edema, [] Brain compression/ herniation,   [] Functional quadriplegia  [] Acute blood loss anemia

## 2023-02-28 NOTE — PROGRESS NOTE ADULT - ASSESSMENT
32y/M with  Anxiety        PLAN:   NEURO:  REHAB:  physical therapy evaluation and management    EARLY MOB:      PULM:  Room air, incentive spirometry  CARDIO:  SBP goal 100-150mm Hg  ENDO:  Blood sugar goals 140-180 mg/dL, continue insulin sliding scale  GI:  PPI for GI prophylaxis  DIET:  RENAL:    HEM/ONC:  VTE Prophylaxis:     ID: afebrile, no leukocytosis  ====================   T(F): , Max: 98.5 (02-28-23 @ 17:57)    WBC Count: 8.51 (02-28)  WBC Count: 10.81 (02-28)  WBC Count: 12.21 (02-27)    ====================  Social: will update family    Active issues:  What's keeping patient in the ICU?  What is this patient's dispo plan?    ATTENDING ATTESTATION:  I was physically present for the key portions of the evaluation and management (E/M) service provided.  I agree with the above history, physical and plan, which I have reviewed and edited where appropriate.    Patient at high risk for neurological deterioration or death due to:  ICU delirium, aspiration PNA, DVT / PE.  Critical care time:  I have personally provided 45 minutes of critical care time, excluding time spent on separate procedures.      Plan discussed with RN, house staff. 32y/M with  R Pcomm aneurysm, unruptured  headache  anxiety     PLAN:   NEURO: neurochecks q1h, PRN pain meds with acetaminophen, opiates  for clipping tomorrow  REHAB:  physical therapy evaluation and management    EARLY MOB:  OOB to chair, ambulate    PULM:  Room air, incentive spirometry  CARDIO:  SBP goal 100-140mm Hg  ENDO:  Blood sugar goals 140-180 mg/dL, continue insulin sliding scale  GI:  bowel regimen  DIET: NPO after midnight  RENAL:  NS@100cc/hr  HEM/ONC: Hb stable  VTE Prophylaxis: SCDs, no DVT chemoprophylaxis for now as patient is high risk for bleed (OR tomorrow)  ID: afebrile, no leukocytosis, periop ancef tomorrow  Social: will update family    Active issues:  What's keeping patient in the ICU? OR tomorrow   What is this patient's dispo plan?    ATTENDING ATTESTATION:  I was physically present for the key portions of the evaluation and management (E/M) service provided.  I agree with the above history, physical and plan, which I have reviewed and edited where appropriate.    Patient at high risk for neurological deterioration or death due to:  ICU delirium, aspiration PNA, DVT / PE.  Critical care time:  I have personally provided 45 minutes of critical care time, excluding time spent on separate procedures.      Plan discussed with RN, house staff.

## 2023-02-28 NOTE — PROGRESS NOTE ADULT - SUBJECTIVE AND OBJECTIVE BOX
=================================  NEUROCRITICAL CARE ATTENDING NOTE  =================================    JUANITO MASON   MRN-[id number]  Summary:  32y/M with PMHx of anxiety who originally presented to [facility] with severe headache, nausea with emesis x 1 and diaphoresis and was found to have a right 6mm ICA aneurysm on CTA. Patient reports that this morning he woke up with a severe headache that was accompanied by an episode of nausea and vomiting. He also endorses that the headache was so severe he became extremely anxious, felt as if he was having a panic attack and became diaphoretic. He states that headaches are controlled from the pain medicine he received while at [place] and did not have any additional episodes of nausea/vomiting. Pending further workup with repeat CTH/CTA and possible cerebral angiogram  Denies visual changes, dizziness, cigarette smoking, family hx of brain aneurysm, previous recurrent headaches, changes in sensation, mental status, unilateral weakness, confusion. (27 Feb 2023 23:14)    COURSE IN THE HOSPITAL:  02/27 admitted to [facility], s/p angio, unable to coil aneurysm    Past Medical History: Anxiety  Allergies:  No Known Allergies  Home meds:     PHYSICAL EXAMINATION  T(C): 36.9 (02-28 @ 17:57), Max: 36.9 (02-28 @ 17:57) HR: 68 (02-28 @ 18:00) (58 - 80) BP: 112/64 (02-28 @ 18:00) (96/59 - 122/60) RR: 19 (02-28 @ 18:00) (11 - 25) SpO2: 97% (02-28 @ 18:00) (95% - 100%)    NEUROLOGIC EXAMINATION:  Patient is awake, alert, fully oriented, pupils 2-3mm equal and briskly reactive to light, EOMs intact, muscle strength 5/5 on all 4s.  GENERAL: not intubated, not in cardiorespiratory distress  EENT:  anicteric  CARDIOVASCULAR: (+) S1 S2, normal rate and regular rhythm  PULMONARY: clear to auscultation bilaterally  ABDOMEN: soft, nontender with normoactive bowel sounds  EXTREMITIES: no edema  SKIN: no rash    LABS:               13.8   8.51  )-----------( 229      ( 28 Feb 2023 14:36 )             40.6     139  |  107  |  13  ----------------------------<  109<H>  4.2   |  24  |  0.88    Ca    8.2<L>      28 Feb 2023 14:36  Phos  2.8     02-28  Mg     1.9     02-28    TPro  6.4  /  Alb  4.1  /  TBili  0.8  /  DBili  x   /  AST  12  /  ALT  12  /  AlkPhos  93  02-27 02-27 @ 07:01  -  02-28 @ 07:00  IN: 300 mL / OUT: 0 mL / NET: 300 mL    Bacteriology:  CSF studies:  EEG:  Neuroimaging:  Other imaging:    MEDICATIONS: 02-28    ·	senna 2 Oral at bedtime  ·	acetaminophen     Tablet .. 650 Oral every 6 hours PRN  ·	ondansetron Injectable 4 IV Push every 6 hours PRN    IV FLUIDS:  DRIPS:  DIET:  Lines:  Drains:    Wounds:    CODE STATUS:  Full Code                       GOALS OF CARE:  aggressive                      DISPOSITION:  ICU =================================  NEUROCRITICAL CARE ATTENDING NOTE  =================================    JUANITO MASON   MRN-[id number]  Summary:  32y/M with PMHx of anxiety who originally presented to [facility] with severe headache, nausea with emesis x 1 and diaphoresis and was found to have a right 6mm ICA aneurysm on CTA. Patient reports that this morning he woke up with a severe headache that was accompanied by an episode of nausea and vomiting. He also endorses that the headache was so severe he became extremely anxious, felt as if he was having a panic attack and became diaphoretic. He states that headaches are controlled from the pain medicine he received while at [place] and did not have any additional episodes of nausea/vomiting. Pending further workup with repeat CTH/CTA and possible cerebral angiogram  Denies visual changes, dizziness, cigarette smoking, family hx of brain aneurysm, previous recurrent headaches, changes in sensation, mental status, unilateral weakness, confusion. (2023 23:14)    COURSE IN THE HOSPITAL:   admitted to [facility], s/p angio, unable to coil aneurysm    Past Medical History: Anxiety  Allergies:  No Known Allergies  Home meds:     PHYSICAL EXAMINATION  T(C): 36.9 ( @ 17:57), Max: 36.9 ( @ 17:57) HR: 68 ( @ 18:00) (58 - 80) BP: 112/64 ( @ 18:00) (96/59 - 122/60) RR: 19 ( @ 18:00) (11 - 25) SpO2: 97% ( @ 18:00) (95% - 100%)    NEUROLOGIC EXAMINATION:  Patient is awake, alert, fully oriented, pupils 2-3mm equal and briskly reactive to light, EOMs intact, muscle strength 5/5 on all 4s.  GENERAL: not intubated, not in cardiorespiratory distress  EENT:  anicteric  CARDIOVASCULAR: (+) S1 S2, normal rate and regular rhythm  PULMONARY: clear to auscultation bilaterally  ABDOMEN: soft, nontender with normoactive bowel sounds  EXTREMITIES: no edema  SKIN: no rash    LABS:               13.8   8.51  )-----------( 229      ( 2023 14:36 )             40.6     139  |  107  |  13  ----------------------------<  109<H>  4.2   |  24  |  0.88    Ca    8.2<L>      2023 14:36  Phos  2.8       Mg     1.9         TPro  6.4  /  Alb  4.1  /  TBili  0.8  /  DBili  x   /  AST  12  /  ALT  12  /  AlkPhos  93   @ 07:01  -   @ 07:00  IN: 300 mL / OUT: 0 mL / NET: 300 mL    Bacteriology:  CSF studies:  CSF STUDIES    G   P   L   *** [id number] WBC22 *** %N   %L5     L2.0 *** [id number] WBC22 *** %N   %L6     EEG:  Neuroimaging:  Other imaging:    MEDICATIONS:     ·	senna 2 Oral at bedtime  ·	acetaminophen     Tablet .. 650 Oral every 6 hours PRN  ·	ondansetron Injectable 4 IV Push every 6 hours PRN    IV FLUIDS: IVL  DRIPS:  DIET: regular diet, NPO after midnight   Lines: A Line   Drains:    Wounds:    CODE STATUS:  Full Code                       GOALS OF CARE:  aggressive                      DISPOSITION:  ICU

## 2023-02-28 NOTE — PROGRESS NOTE ADULT - SUBJECTIVE AND OBJECTIVE BOX
HOSPITALIST INITIAL CONSULT NOTE    CHIEF COMPLAINT:      HPI:  Patient is a 31yo M with PMHx of anxiety who originally presented to [facility] with severe headache, nausea with emesis x 1 and diaphoresis and was found to have a right 6mm ICA aneurysm on CTA. Patient reports that this morning he woke up with a severe headache that was accompanied by an episode of nausea and vomiting. He also endorses that the headache was so severe he became extremely anxious, felt as if he was having a panic attack and became diaphoretic. He states that headaches are controlled from the pain medicine he received while at [place] and did not have any additional episodes of nausea/vomiting. Pending further workup with repeat CTH/CTA and possible cerebral angiogram     Denies visual changes, dizziness, cigarette smoking, family hx of brain aneurysm, previous recurrent headaches, changes in sensation, mental status, unilateral weakness, confusion. (27 Feb 2023 23:14)      PAST MEDICAL & SURGICAL HISTORY:  Anxiety      No significant past surgical history          REVIEW OF SYSTEMS:  As per HPI, otherwise negative for Constitutional, Eyes, Ears/Nose/Mouth/Throat, Neck, Cardiovascular, Respiratory, Gastrointestinal, Genitourinary, Skin, Endocrine, Musculoskeletal, Psychiatric, and Hematologic/Lymphatic.    MEDICATIONS  (STANDING):  influenza   Vaccine 0.5 milliLiter(s) IntraMuscular once  lidocaine 2% Injectable 30 milliLiter(s) Local Injection once  senna 2 Tablet(s) Oral at bedtime  sodium chloride 0.9%. 1000 milliLiter(s) (75 mL/Hr) IV Continuous <Continuous>    MEDICATIONS  (PRN):  acetaminophen     Tablet .. 650 milliGRAM(s) Oral every 6 hours PRN Temp greater or equal to 38.5C (101.3F), Moderate Pain (4 - 6)  ondansetron Injectable 4 milliGRAM(s) IV Push every 6 hours PRN Nausea and/or Vomiting      Allergies    No Known Allergies    Intolerances        FAMILY HISTORY:  No pertinent family history in first degree relatives        Social History:  Patient works as an artist, lives on the second story of an apartment building with his fiance. (27 Feb 2023 23:14)      VITALS  Vital Signs Last 24 Hrs  T(C): 36.7 (28 Feb 2023 04:39), Max: 36.7 (28 Feb 2023 04:39)  T(F): 98 (28 Feb 2023 04:39), Max: 98 (28 Feb 2023 04:39)  HR: 62 (28 Feb 2023 04:05) (62 - 72)  BP: 105/58 (28 Feb 2023 04:05) (105/58 - 122/60)  BP(mean): 75 (28 Feb 2023 04:05) (74 - 83)  RR: 18 (28 Feb 2023 04:05) (18 - 18)  SpO2: 95% (28 Feb 2023 04:05) (95% - 97%)    Parameters below as of 28 Feb 2023 04:05  Patient On (Oxygen Delivery Method): room air        I&O's Summary    27 Feb 2023 07:01  -  28 Feb 2023 07:00  --------------------------------------------------------  IN: 300 mL / OUT: 0 mL / NET: 300 mL        CAPILLARY BLOOD GLUCOSE          PHYSICAL EXAM  General: A&Ox3; NAD  Head: NC/AT; PERRL; EOMI; anicteric sclera  Neck: Supple; no JVD  Respiratory: CTA B/L; no wheezes/crackles/rales auscultated w/ good air movement  Cardiovascular: Regular rhythm/rate; S1/S2; no gallops or murmurs auscultated  Gastrointestinal: Soft; NTND w/out rebound tenderness or guarding; bowel sounds normal  Extremities: WWP; no edema or cyanosis; radial/pedal pulses palpable  Neurological:  CNII-XII grossly intact; no obvious focal deficits  Skin: No rashes noted  Vasc: +2 DP/PT pulses b/l   Psych: Appropriate Affect    LABS:                        14.5   10.81 )-----------( 234      ( 28 Feb 2023 05:30 )             42.5     02-28    138  |  104  |  13  ----------------------------<  112<H>  4.0   |  24  |  0.85    Ca    9.1      28 Feb 2023 05:30  Phos  2.9     02-28  Mg     2.4     02-28    TPro  6.4  /  Alb  4.1  /  TBili  0.8  /  DBili  x   /  AST  12  /  ALT  12  /  AlkPhos  93  02-27    PT/INR - ( 27 Feb 2023 23:46 )   PT: 12.7 sec;   INR: 1.07          PTT - ( 27 Feb 2023 23:46 )  PTT:31.6 sec      RADIOLOGY & ADDITIONAL STUDIES:  reviewed

## 2023-03-01 ENCOUNTER — TRANSCRIPTION ENCOUNTER (OUTPATIENT)
Age: 33
End: 2023-03-01

## 2023-03-01 ENCOUNTER — APPOINTMENT (OUTPATIENT)
Dept: NEUROSURGERY | Facility: HOSPITAL | Age: 33
End: 2023-03-01

## 2023-03-01 LAB
ANION GAP SERPL CALC-SCNC: 8 MMOL/L — SIGNIFICANT CHANGE UP (ref 5–17)
APTT BLD: 26.6 SEC — LOW (ref 27.5–35.5)
BASE EXCESS BLDA CALC-SCNC: -4.1 MMOL/L — LOW (ref -2–3)
BUN SERPL-MCNC: 13 MG/DL — SIGNIFICANT CHANGE UP (ref 7–23)
CALCIUM SERPL-MCNC: 9 MG/DL — SIGNIFICANT CHANGE UP (ref 8.4–10.5)
CHLORIDE SERPL-SCNC: 105 MMOL/L — SIGNIFICANT CHANGE UP (ref 96–108)
CO2 BLDA-SCNC: 23 MMOL/L — SIGNIFICANT CHANGE UP (ref 19–24)
CO2 SERPL-SCNC: 25 MMOL/L — SIGNIFICANT CHANGE UP (ref 22–31)
CREAT SERPL-MCNC: 0.83 MG/DL — SIGNIFICANT CHANGE UP (ref 0.5–1.3)
EGFR: 119 ML/MIN/1.73M2 — SIGNIFICANT CHANGE UP
GLUCOSE SERPL-MCNC: 108 MG/DL — HIGH (ref 70–99)
HCO3 BLDA-SCNC: 22 MMOL/L — SIGNIFICANT CHANGE UP (ref 21–28)
HCT VFR BLD CALC: 42.4 % — SIGNIFICANT CHANGE UP (ref 39–50)
HGB BLD-MCNC: 14.3 G/DL — SIGNIFICANT CHANGE UP (ref 13–17)
INR BLD: 1.19 — HIGH (ref 0.88–1.16)
MAGNESIUM SERPL-MCNC: 2 MG/DL — SIGNIFICANT CHANGE UP (ref 1.6–2.6)
MCHC RBC-ENTMCNC: 31 PG — SIGNIFICANT CHANGE UP (ref 27–34)
MCHC RBC-ENTMCNC: 33.7 GM/DL — SIGNIFICANT CHANGE UP (ref 32–36)
MCV RBC AUTO: 92 FL — SIGNIFICANT CHANGE UP (ref 80–100)
NRBC # BLD: 0 /100 WBCS — SIGNIFICANT CHANGE UP (ref 0–0)
PCO2 BLDA: 41 MMHG — SIGNIFICANT CHANGE UP (ref 35–48)
PH BLDA: 7.33 — LOW (ref 7.35–7.45)
PHOSPHATE SERPL-MCNC: 2.8 MG/DL — SIGNIFICANT CHANGE UP (ref 2.5–4.5)
PLATELET # BLD AUTO: 241 K/UL — SIGNIFICANT CHANGE UP (ref 150–400)
PO2 BLDA: 174 MMHG — HIGH (ref 83–108)
POTASSIUM SERPL-MCNC: 4.2 MMOL/L — SIGNIFICANT CHANGE UP (ref 3.5–5.3)
POTASSIUM SERPL-SCNC: 4.2 MMOL/L — SIGNIFICANT CHANGE UP (ref 3.5–5.3)
PROTHROM AB SERPL-ACNC: 14.2 SEC — HIGH (ref 10.5–13.4)
RBC # BLD: 4.61 M/UL — SIGNIFICANT CHANGE UP (ref 4.2–5.8)
RBC # FLD: 14 % — SIGNIFICANT CHANGE UP (ref 10.3–14.5)
SAO2 % BLDA: 99.5 % — HIGH (ref 94–98)
SODIUM SERPL-SCNC: 138 MMOL/L — SIGNIFICANT CHANGE UP (ref 135–145)
TROPONIN T SERPL-MCNC: 0.01 NG/ML — SIGNIFICANT CHANGE UP (ref 0–0.01)
WBC # BLD: 16.46 K/UL — HIGH (ref 3.8–10.5)
WBC # FLD AUTO: 16.46 K/UL — HIGH (ref 3.8–10.5)

## 2023-03-01 PROCEDURE — 92240 ICG ANGIOGRAPHY I&R UNI/BI: CPT | Mod: 26

## 2023-03-01 PROCEDURE — 43752 NASAL/OROGASTRIC W/TUBE PLMT: CPT

## 2023-03-01 PROCEDURE — 70450 CT HEAD/BRAIN W/O DYE: CPT | Mod: 26

## 2023-03-01 PROCEDURE — 62200 ESTABLISH BRAIN CAVITY SHUNT: CPT

## 2023-03-01 PROCEDURE — 99291 CRITICAL CARE FIRST HOUR: CPT

## 2023-03-01 PROCEDURE — 61697 BRAIN ANEURYSM REPR COMPLX: CPT | Mod: 22

## 2023-03-01 PROCEDURE — 71045 X-RAY EXAM CHEST 1 VIEW: CPT | Mod: 26

## 2023-03-01 PROCEDURE — 93888 INTRACRANIAL LIMITED STUDY: CPT | Mod: 26

## 2023-03-01 PROCEDURE — 69990 MICROSURGERY ADD-ON: CPT

## 2023-03-01 DEVICE — PIN SKULL STRL ADLT DORO LUCENT DISP PK/3: Type: IMPLANTABLE DEVICE | Status: FUNCTIONAL

## 2023-03-01 DEVICE — SURGICEL 4 X 8": Type: IMPLANTABLE DEVICE | Status: FUNCTIONAL

## 2023-03-01 DEVICE — ELECT 8 CONTACT 0.76X4.5MM: Type: IMPLANTABLE DEVICE | Status: FUNCTIONAL

## 2023-03-01 DEVICE — SURGIFLO HEMOSTATIC MATRIX KIT: Type: IMPLANTABLE DEVICE | Status: FUNCTIONAL

## 2023-03-01 DEVICE — PLATE LO PROF 8 HOLE: Type: IMPLANTABLE DEVICE | Status: FUNCTIONAL

## 2023-03-01 DEVICE — COLLAGEN DURAMATRIX ONLAY 4X5IN: Type: IMPLANTABLE DEVICE | Status: FUNCTIONAL

## 2023-03-01 DEVICE — CLIP APPLIER ETHICON LIGACLIP 11.5" MEDIUM: Type: IMPLANTABLE DEVICE | Status: FUNCTIONAL

## 2023-03-01 DEVICE — SURGIFOAM PAD 8CM X 12.5CM X 10MM (100): Type: IMPLANTABLE DEVICE | Status: FUNCTIONAL

## 2023-03-01 DEVICE — SCREW UN3 AXS SELF DRILL 1.5X4MM: Type: IMPLANTABLE DEVICE | Status: FUNCTIONAL

## 2023-03-01 DEVICE — SURGICEL FIBRILLAR 4 X 4": Type: IMPLANTABLE DEVICE | Status: FUNCTIONAL

## 2023-03-01 DEVICE — IMPLANTABLE DEVICE: Type: IMPLANTABLE DEVICE | Status: FUNCTIONAL

## 2023-03-01 DEVICE — CLIP ANEUR TII TEMP 9X7 STR: Type: IMPLANTABLE DEVICE | Status: FUNCTIONAL

## 2023-03-01 DEVICE — CLIP APPLIER ETHICON LIGACLIP 9 3/8" SMALL: Type: IMPLANTABLE DEVICE | Status: FUNCTIONAL

## 2023-03-01 DEVICE — CLIP ANEUR TII 11.5X12 STR: Type: IMPLANTABLE DEVICE | Status: FUNCTIONAL

## 2023-03-01 DEVICE — CLIP ANEUR TII TEMP 10.5X10 STR: Type: IMPLANTABLE DEVICE | Status: FUNCTIONAL

## 2023-03-01 DEVICE — PLATE UN3 W/TAB 7MM: Type: IMPLANTABLE DEVICE | Status: FUNCTIONAL

## 2023-03-01 RX ORDER — ACETAMINOPHEN 500 MG
750 TABLET ORAL EVERY 6 HOURS
Refills: 0 | Status: DISCONTINUED | OUTPATIENT
Start: 2023-03-01 | End: 2023-03-01

## 2023-03-01 RX ORDER — DEXAMETHASONE 0.5 MG/5ML
4 ELIXIR ORAL EVERY 6 HOURS
Refills: 0 | Status: DISCONTINUED | OUTPATIENT
Start: 2023-03-01 | End: 2023-03-02

## 2023-03-01 RX ORDER — HYDRALAZINE HCL 50 MG
10 TABLET ORAL EVERY 4 HOURS
Refills: 0 | Status: DISCONTINUED | OUTPATIENT
Start: 2023-03-01 | End: 2023-03-02

## 2023-03-01 RX ORDER — NIMODIPINE 60 MG/10ML
60 SOLUTION ORAL EVERY 4 HOURS
Refills: 0 | Status: DISCONTINUED | OUTPATIENT
Start: 2023-03-01 | End: 2023-03-02

## 2023-03-01 RX ORDER — CEFAZOLIN SODIUM 1 G
2000 VIAL (EA) INJECTION EVERY 8 HOURS
Refills: 0 | Status: COMPLETED | OUTPATIENT
Start: 2023-03-01 | End: 2023-03-02

## 2023-03-01 RX ORDER — MAGNESIUM SULFATE 500 MG/ML
4 VIAL (ML) INJECTION ONCE
Refills: 0 | Status: COMPLETED | OUTPATIENT
Start: 2023-03-01 | End: 2023-03-01

## 2023-03-01 RX ORDER — CHLORHEXIDINE GLUCONATE 213 G/1000ML
15 SOLUTION TOPICAL EVERY 12 HOURS
Refills: 0 | Status: DISCONTINUED | OUTPATIENT
Start: 2023-03-01 | End: 2023-03-02

## 2023-03-01 RX ORDER — LEVETIRACETAM 250 MG/1
1000 TABLET, FILM COATED ORAL EVERY 12 HOURS
Refills: 0 | Status: DISCONTINUED | OUTPATIENT
Start: 2023-03-01 | End: 2023-03-02

## 2023-03-01 RX ORDER — SODIUM CHLORIDE 9 MG/ML
1000 INJECTION INTRAMUSCULAR; INTRAVENOUS; SUBCUTANEOUS ONCE
Refills: 0 | Status: COMPLETED | OUTPATIENT
Start: 2023-03-01 | End: 2023-03-01

## 2023-03-01 RX ORDER — LEVETIRACETAM 250 MG/1
1000 TABLET, FILM COATED ORAL EVERY 12 HOURS
Refills: 0 | Status: DISCONTINUED | OUTPATIENT
Start: 2023-03-01 | End: 2023-03-01

## 2023-03-01 RX ORDER — SODIUM CHLORIDE 9 MG/ML
1000 INJECTION, SOLUTION INTRAVENOUS
Refills: 0 | Status: DISCONTINUED | OUTPATIENT
Start: 2023-03-01 | End: 2023-03-01

## 2023-03-01 RX ORDER — POLYETHYLENE GLYCOL 3350 17 G/17G
17 POWDER, FOR SOLUTION ORAL DAILY
Refills: 0 | Status: DISCONTINUED | OUTPATIENT
Start: 2023-03-01 | End: 2023-03-08

## 2023-03-01 RX ORDER — PANTOPRAZOLE SODIUM 20 MG/1
40 TABLET, DELAYED RELEASE ORAL DAILY
Refills: 0 | Status: DISCONTINUED | OUTPATIENT
Start: 2023-03-01 | End: 2023-03-02

## 2023-03-01 RX ADMIN — CHLORHEXIDINE GLUCONATE 1 APPLICATION(S): 213 SOLUTION TOPICAL at 07:02

## 2023-03-01 RX ADMIN — SODIUM CHLORIDE 100 MILLILITER(S): 9 INJECTION INTRAMUSCULAR; INTRAVENOUS; SUBCUTANEOUS at 00:06

## 2023-03-01 RX ADMIN — Medication 1 MILLIGRAM(S): at 23:50

## 2023-03-01 RX ADMIN — SODIUM CHLORIDE 1000 MILLILITER(S): 9 INJECTION INTRAMUSCULAR; INTRAVENOUS; SUBCUTANEOUS at 23:00

## 2023-03-01 RX ADMIN — Medication 1 APPLICATION(S): at 06:57

## 2023-03-01 RX ADMIN — SODIUM CHLORIDE 100 MILLILITER(S): 9 INJECTION INTRAMUSCULAR; INTRAVENOUS; SUBCUTANEOUS at 07:04

## 2023-03-01 NOTE — PROGRESS NOTE ADULT - ASSESSMENT
32y/M with  R Pcomm aneurysm, unruptured  headache  anxiety     PLAN:   NEURO: neurochecks q1h, PRN pain meds with acetaminophen, opiates  for clipping tomorrow  REHAB:  physical therapy evaluation and management    EARLY MOB:  OOB to chair, ambulate    PULM:  Room air, incentive spirometry  CARDIO:  SBP goal 100-140mm Hg  ENDO:  Blood sugar goals 140-180 mg/dL, continue insulin sliding scale  GI:  bowel regimen  DIET: NPO after midnight  RENAL:  NS@100cc/hr  HEM/ONC: Hb stable  VTE Prophylaxis: SCDs, no DVT chemoprophylaxis for now as patient is high risk for bleed (OR tomorrow)  ID: afebrile, no leukocytosis, periop ancef tomorrow  Social: will update family    Active issues:  What's keeping patient in the ICU? OR tomorrow   What is this patient's dispo plan?    ATTENDING ATTESTATION:  I was physically present for the key portions of the evaluation and management (E/M) service provided.  I agree with the above history, physical and plan, which I have reviewed and edited where appropriate.    Patient at high risk for neurological deterioration or death due to:  ICU delirium, aspiration PNA, DVT / PE.  Critical care time:  I have personally provided 45 minutes of critical care time, excluding time spent on separate procedures.      Plan discussed with RN, house staff. 32y/M with  R Pcomm aneurysm, unruptured  headache  anxiety     PLAN:   NEURO: neurochecks q1h, PRN pain meds with acetaminophen, opiates  s/p clipping, CT scan  seizure prophylaxis:  levetiracetam 500mg IV BID, as per neurosurgery   REHAB:  physical therapy evaluation and management    EARLY MOB:  HOB up, bed rest    PULM:  wean to extubate  CARDIO:  SBP goal 100-140mm Hg  ENDO:  Blood sugar goals 140-180 mg/dL  GI:  bowel regimen  DIET: NPO   RENAL:  NS@100cc/hr  HEM/ONC: check post-op Hb, PT/PTT  VTE Prophylaxis: SCDs, no DVT chemoprophylaxis for now as patient is high risk for bleed (OR today)  ID: afebrile, no leukocytosis, periop ancef tomorrow  Social: will update family    Active issues:  What's keeping patient in the ICU? fresh post-op, intubated  What is this patient's dispo plan? stepdown once stable and extubated    ATTENDING ATTESTATION:  I was physically present for the key portions of the evaluation and management (E/M) service provided.  I agree with the above history, physical and plan, which I have reviewed and edited where appropriate.    Patient at high risk for neurological deterioration or death due to:  ICU delirium, aspiration PNA, DVT / PE.  Critical care time:  I have personally provided 45 minutes of critical care time, excluding time spent on separate procedures.      Plan discussed with RN, house staff. 32y/M with  ruptured R Pcomm aneurysm, SAH  headache  anxiety     PLAN:   NEURO: neurochecks q1h, PRN pain meds with acetaminophen, opiates  start nimodipine  s/p clipping, CT scan; for angio tomorrow  seizure prophylaxis:  levetiracetam 500mg IV BID, as per neurosurgery   REHAB:  physical therapy evaluation and management    EARLY MOB:  HOB up, bed rest    PULM:  keep intubated  CARDIO:  SBP goal 100-120mm Hg overnight, EKG, echo  ENDO:  Blood sugar goals 140-180 mg/dL  GI:  bowel regimen, start PPI  DIET: NPO   RENAL:  NS@100cc/hr, strict Is and Os, keep euvolemic  HEM/ONC: check post-op Hb, PT/PTT  VTE Prophylaxis: SCDs, no DVT chemoprophylaxis for now as patient is high risk for bleed (OR today)  ID: afebrile, no leukocytosis, periop ancef tomorrow  Social: will update family    Active issues:  What's keeping patient in the ICU? fresh post-op, intubated  What is this patient's dispo plan? stepdown once stable and extubated    ATTENDING ATTESTATION:  I was physically present for the key portions of the evaluation and management (E/M) service provided.  I agree with the above history, physical and plan, which I have reviewed and edited where appropriate.    Patient at high risk for neurological deterioration or death due to:  ICU delirium, aspiration PNA, DVT / PE.  Critical care time:  I have personally provided 45 minutes of critical care time, excluding time spent on separate procedures.      Plan discussed with RN, house staff.

## 2023-03-01 NOTE — DIETITIAN INITIAL EVALUATION ADULT - PERTINENT MEDS FT
MEDICATIONS  (STANDING):  chlorhexidine 2% Cloths 1 Application(s) Topical <User Schedule>  influenza   Vaccine 0.5 milliLiter(s) IntraMuscular once  senna 2 Tablet(s) Oral at bedtime  sodium chloride 0.9%. 1000 milliLiter(s) (100 mL/Hr) IV Continuous <Continuous>    MEDICATIONS  (PRN):  acetaminophen     Tablet .. 650 milliGRAM(s) Oral every 6 hours PRN Temp greater or equal to 38.5C (101.3F), Moderate Pain (4 - 6)  ondansetron Injectable 4 milliGRAM(s) IV Push every 6 hours PRN Nausea and/or Vomiting

## 2023-03-01 NOTE — DIETITIAN INITIAL EVALUATION ADULT - NS FNS DIET ORDER
Diet, NPO after Midnight:      NPO Start Date: 28-Feb-2023,   NPO Start Time: 23:59  Except Medications (02-28-23 @ 20:25)  Diet, Regular (02-28-23 @ 15:54)

## 2023-03-01 NOTE — DIETITIAN INITIAL EVALUATION ADULT - PERTINENT LABORATORY DATA
03-01    138  |  105  |  13  ----------------------------<  108<H>  4.2   |  25  |  0.83    Ca    9.0      01 Mar 2023 05:30  Phos  2.8     03-01  Mg     2.0     03-01    TPro  6.4  /  Alb  4.1  /  TBili  0.8  /  DBili  x   /  AST  12  /  ALT  12  /  AlkPhos  93  02-27

## 2023-03-01 NOTE — DIETITIAN INITIAL EVALUATION ADULT - OTHER INFO
31yo M with PMHx of anxiety who originally presented to [facility] with severe headache, nausea with emesis x 1 and diaphoresis and was found to have a right 6mm ICA aneurysm on CTA, admitted, now s/p lumbar puncture 2/28 and s/p cerebral angiogram with findings of 7.8 mm R Pcomm aneurysm 2/28. Preop for R crani for aneurysm clipping 3/1.     On assessment, pt was out of room at time of assessment. Currently NPO for OR today. No n/v/d/c. No abd distention or discomfort. Skin; surgical incision, grupo score 21. No edema noted. Unable to assess UBW nor appetite PTA. NKFA per EMR. Education deferred at this time. RD to follow up per protocol.

## 2023-03-01 NOTE — BRIEF OPERATIVE NOTE - OPERATION/FINDINGS
-Right pterional craniotomy and clipping of right Pcom aneurysm  -Dura intact after craniotomy  -Brain was tense but pulsatile after dural opening  -Intraop findings were diagnostic of previous aneurysm rupture  -Lamina terminalis was opened and CSF was released  -Intra-op rupture of aneurysm happened while dissecting around the aneurysm, bleeding controlled with 2 permanent clips application  -ICG angiography revealed no evidence of residual aneurysm filling and no parent vessel compromise  -Optimum hemostasis prior to dural closure  -Primary dural closure with 4-0 nurolon  -Bone flap was replaced and secure with plates and screws  -SG drain in  -Skin closed with staples -Right pterional craniotomy and clipping of right Pcom aneurysm  -Dura intact after craniotomy  -Brain was tense but pulsatile after dural opening  -Intraop findings were diagnostic of previous aneurysm rupture  -Lamina terminalis was opened and CSF was released  -Intra-op rupture of aneurysm happened while dissecting around the aneurysm, bleeding controlled with 2 permanent clips application  -ICG angiography revealed no evidence of residual aneurysm filling and no parent vessel compromise  -Optimum hemostasis prior to dural closure  -Primary dural closure with 4-0 nurolon  -Bone flap was replaced and secured with plates and screws  -SG drain in  -Skin closed with staples -Right pterional craniotomy and clipping of right Pcom aneurysm  -Dura intact after craniotomy  -Brain was tense but pulsatile after dural opening  -Intraop findings were diagnostic of previous aneurysm rupture  -Lamina terminalis was opened and CSF was released  -Aneurysm bleeding controlled with 2 permanent clips application  -ICG angiography revealed no evidence of residual aneurysm filling and no parent vessel compromise  -Optimum hemostasis prior to dural closure  -Primary dural closure with 4-0 nurolon  -Bone flap was replaced and secured with plates and screws  -SG drain in  -Skin closed with staples

## 2023-03-01 NOTE — PROGRESS NOTE ADULT - SUBJECTIVE AND OBJECTIVE BOX
SUBJECTIVE:   31yo M with PMHx of anxiety who originally presented to [facility] with severe headache, nausea with emesis x 1 and diaphoresis and was found to have a right 6mm ICA aneurysm on CTA, admitted, now s/p lumbar puncture 2/28 and s/p cerebral angiogram with findings of 7.8 mm R Pcomm aneurysm 2/28.     Pt denies any complaints at this time.         HOSPITAL COURSE:  2/28: Admitted for further workup, CTH/CTA obtained, LP done, r/o xanthochromia, traumatic LP resulting in blood in all tubes. POD0 s/p cerebral angiogram with findings of 7.8mm R Pcomm aneurysm. Plan for OR in the morning for clipping of aneurysm.   3/1: NICK o/n neuro stable. OR today          Vital Signs Last 24 Hrs  T(C): 37.1 (01 Mar 2023 09:46), Max: 37.2 (28 Feb 2023 21:45)  T(F): 98.7 (01 Mar 2023 09:46), Max: 98.9 (28 Feb 2023 21:45)  HR: 71 (01 Mar 2023 11:00) (58 - 91)  BP: 106/61 (01 Mar 2023 10:00) (96/59 - 125/66)  BP(mean): 78 (01 Mar 2023 10:00) (73 - 98)  RR: 16 (01 Mar 2023 11:00) (11 - 26)  SpO2: 96% (01 Mar 2023 11:00) (93% - 100%)    Parameters below as of 01 Mar 2023 11:00  Patient On (Oxygen Delivery Method): room air        I&O's Summary    28 Feb 2023 07:01  -  01 Mar 2023 07:00  --------------------------------------------------------  IN: 1800 mL / OUT: 1550 mL / NET: 250 mL    01 Mar 2023 07:01  -  01 Mar 2023 11:21  --------------------------------------------------------  IN: 500 mL / OUT: 0 mL / NET: 500 mL        PHYSICAL EXAM:  General: Lying semifowler in bed. NAD. Appears calm with family.   HEENT: B/L PERRL 3mm. EOMI. Face symmetrical.   Neuro: AOx3. B/L upper and lower extremities 5/5 strength; sensation intact. No pronator drift.   Cardio: Normal rate/rhythm.   Pulm: Normal respiration rate. Normal chest wall rise.   Abd: Soft, nondistended, nontender.   Extremities: No edema.       LABS:                        14.3   16.46 )-----------( 241      ( 01 Mar 2023 05:30 )             42.4     03-01    138  |  105  |  13  ----------------------------<  108<H>  4.2   |  25  |  0.83    Ca    9.0      01 Mar 2023 05:30  Phos  2.8     03-01  Mg     2.0     03-01    TPro  6.4  /  Alb  4.1  /  TBili  0.8  /  DBili  x   /  AST  12  /  ALT  12  /  AlkPhos  93  02-27    PT/INR - ( 01 Mar 2023 05:30 )   PT: 14.2 sec;   INR: 1.19       PTT - ( 01 Mar 2023 05:30 )  PTT:26.6 sec        Allergies:  No Known Allergies    MEDICATIONS:  Antibiotics:    Neuro:  acetaminophen     Tablet .. 650 milliGRAM(s) Oral every 6 hours PRN  ondansetron Injectable 4 milliGRAM(s) IV Push every 6 hours PRN    Anticoagulation:    OTHER:  chlorhexidine 2% Cloths 1 Application(s) Topical <User Schedule>  influenza   Vaccine 0.5 milliLiter(s) IntraMuscular once  senna 2 Tablet(s) Oral at bedtime    IVF:  sodium chloride 0.9%. 1000 milliLiter(s) IV Continuous <Continuous>        ASSESSMENT:  31yo M with PMHx of anxiety who originally presented to [facility] with severe headache, nausea with emesis x 1 and diaphoresis and was found to have a right 6mm ICA aneurysm on CTA, admitted, now s/p lumbar puncture 2/28 and s/p cerebral angiogram with findings of 7.8 mm R Pcomm aneurysm 2/28. Preop for R crani for aneurysm clipping 3/1.     PLAN  NEURO  - Neuro/vitals q 1 hours  - Pain control PRN  - CTH and CTA head/neck completed  - preop for R crani for aneurysm clipping 3/1    PULM  - Satting well on room air     CARDIO  - SBP <140    GI  - NPO for OR  - bowel regimen  - Last BM 2/27     ENDO  - no active issues    RENAL  - IVF @ 100/hr while NPO  - voiding     HEME  - SCDs    ID  - Afebrile    Dispo: ICU status, full code, dispo pending     Assessment and plan discussed with Dr. Wiseman and Dr. Gomez     SUBJECTIVE:   31yo M with PMHx of anxiety who originally presented to [facility] with severe headache, nausea with emesis x 1 and diaphoresis and was found to have a right 6mm ICA aneurysm on CTA, admitted, now s/p lumbar puncture 2/28 and s/p cerebral angiogram with findings of 7.8 mm R Pcomm aneurysm 2/28.     Pt denies any complaints at this time.     HOSPITAL COURSE:  2/28: Admitted for further workup, CTH/CTA obtained, LP done, r/o xanthochromia, traumatic LP resulting in blood in all tubes. POD0 s/p cerebral angiogram with findings of 7.8mm R Pcomm aneurysm. Plan for OR in the morning for clipping of aneurysm.   3/1: NICK o/n neuro stable. OR today    Vital Signs Last 24 Hrs  T(C): 37.1 (01 Mar 2023 09:46), Max: 37.2 (28 Feb 2023 21:45)  T(F): 98.7 (01 Mar 2023 09:46), Max: 98.9 (28 Feb 2023 21:45)  HR: 71 (01 Mar 2023 11:00) (58 - 91)  BP: 106/61 (01 Mar 2023 10:00) (96/59 - 125/66)  BP(mean): 78 (01 Mar 2023 10:00) (73 - 98)  RR: 16 (01 Mar 2023 11:00) (11 - 26)  SpO2: 96% (01 Mar 2023 11:00) (93% - 100%)    Parameters below as of 01 Mar 2023 11:00  Patient On (Oxygen Delivery Method): room air    I&O's Summary    28 Feb 2023 07:01  -  01 Mar 2023 07:00  --------------------------------------------------------  IN: 1800 mL / OUT: 1550 mL / NET: 250 mL    01 Mar 2023 07:01  -  01 Mar 2023 11:21  --------------------------------------------------------  IN: 500 mL / OUT: 0 mL / NET: 500 mL    PHYSICAL EXAM:  General: Lying semifowler in bed. NAD. Appears calm with family.   HEENT: B/L PERRL 3mm. EOMI. Face symmetrical.   Neuro: AOx3. B/L upper and lower extremities 5/5 strength; sensation intact. No pronator drift.   Cardio: Normal rate/rhythm.   Pulm: Normal respiration rate. Normal chest wall rise.   Abd: Soft, nondistended, nontender.   Extremities: No edema.   Vascular: Pulses 2+ b/l DP, PT     LABS:                        14.3   16.46 )-----------( 241      ( 01 Mar 2023 05:30 )             42.4     03-01    138  |  105  |  13  ----------------------------<  108<H>  4.2   |  25  |  0.83    Ca    9.0      01 Mar 2023 05:30  Phos  2.8     03-01  Mg     2.0     03-01    TPro  6.4  /  Alb  4.1  /  TBili  0.8  /  DBili  x   /  AST  12  /  ALT  12  /  AlkPhos  93  02-27    PT/INR - ( 01 Mar 2023 05:30 )   PT: 14.2 sec;   INR: 1.19       PTT - ( 01 Mar 2023 05:30 )  PTT:26.6 sec        Allergies:  No Known Allergies    MEDICATIONS:  Antibiotics:    Neuro:  acetaminophen     Tablet .. 650 milliGRAM(s) Oral every 6 hours PRN  ondansetron Injectable 4 milliGRAM(s) IV Push every 6 hours PRN    Anticoagulation:    OTHER:  chlorhexidine 2% Cloths 1 Application(s) Topical <User Schedule>  influenza   Vaccine 0.5 milliLiter(s) IntraMuscular once  senna 2 Tablet(s) Oral at bedtime    IVF:  sodium chloride 0.9%. 1000 milliLiter(s) IV Continuous <Continuous>        ASSESSMENT:  31yo M with PMHx of anxiety who originally presented to [facility] with severe headache, nausea with emesis x 1 and diaphoresis and was found to have a right 6mm ICA aneurysm on CTA, admitted, now s/p lumbar puncture 2/28 and s/p cerebral angiogram with findings of 7.8 mm R Pcomm aneurysm 2/28. Preop for R crani for aneurysm clipping 3/1.     PLAN  NEURO  - Neuro/vitals q 1 hours  - Pain control PRN  - CTH and CTA head/neck completed  - preop for R crani for aneurysm clipping 3/1    PULM  - Satting well on room air     CARDIO  - SBP <140    GI  - NPO for OR  - bowel regimen  - Last BM 2/27     ENDO  - no active issues    RENAL  - IVF @ 100/hr while NPO  - voiding     HEME  - SCDs    ID  - Afebrile    Dispo: ICU status, full code, dispo pending     Assessment and plan discussed with Dr. Wiseman and Dr. Gomez     SUBJECTIVE:   31yo M with PMHx of anxiety who originally presented to [facility] with severe headache, nausea with emesis x 1 and diaphoresis and was found to have a right 6mm ICA aneurysm on CTA, admitted, now s/p lumbar puncture 2/28 and s/p cerebral angiogram with findings of 7.8 mm R Pcomm aneurysm 2/28.     Pt denies any complaints at this time.     HOSPITAL COURSE:  2/28: Admitted for further workup, CTH/CTA obtained, LP done, r/o xanthochromia, traumatic LP resulting in blood in all tubes. POD0 s/p cerebral angiogram with findings of 7.8mm R Pcomm aneurysm. Plan for OR in the morning for clipping of aneurysm.   3/1: NICK o/n neuro stable. OR today    Vital Signs Last 24 Hrs  T(C): 37.1 (01 Mar 2023 09:46), Max: 37.2 (28 Feb 2023 21:45)  T(F): 98.7 (01 Mar 2023 09:46), Max: 98.9 (28 Feb 2023 21:45)  HR: 71 (01 Mar 2023 11:00) (58 - 91)  BP: 106/61 (01 Mar 2023 10:00) (96/59 - 125/66)  BP(mean): 78 (01 Mar 2023 10:00) (73 - 98)  RR: 16 (01 Mar 2023 11:00) (11 - 26)  SpO2: 96% (01 Mar 2023 11:00) (93% - 100%)    Parameters below as of 01 Mar 2023 11:00  Patient On (Oxygen Delivery Method): room air    I&O's Summary    28 Feb 2023 07:01  -  01 Mar 2023 07:00  --------------------------------------------------------  IN: 1800 mL / OUT: 1550 mL / NET: 250 mL    01 Mar 2023 07:01  -  01 Mar 2023 11:21  --------------------------------------------------------  IN: 500 mL / OUT: 0 mL / NET: 500 mL    PHYSICAL EXAM:  General: Lying semifowler in bed. NAD. Appears calm with family.   HEENT: B/L PERRL 3mm. EOMI. Face symmetrical.   Neuro: AOx3. B/L upper and lower extremities 5/5 strength; sensation intact. No pronator drift.   Cardio: Normal rate/rhythm.   Pulm: Normal respiration rate. Normal chest wall rise.   Abd: Soft, nondistended, nontender.   Extremities: No edema.   Vascular: Pulses 2+ b/l DP, PT   Wound: Femoral artery entry site dressed with steri strips and gauze; C/D/I. Soft, nontender to palpation. No hematoma noted.     LABS:                        14.3   16.46 )-----------( 241      ( 01 Mar 2023 05:30 )             42.4     03-01    138  |  105  |  13  ----------------------------<  108<H>  4.2   |  25  |  0.83    Ca    9.0      01 Mar 2023 05:30  Phos  2.8     03-01  Mg     2.0     03-01    TPro  6.4  /  Alb  4.1  /  TBili  0.8  /  DBili  x   /  AST  12  /  ALT  12  /  AlkPhos  93  02-27    PT/INR - ( 01 Mar 2023 05:30 )   PT: 14.2 sec;   INR: 1.19       PTT - ( 01 Mar 2023 05:30 )  PTT:26.6 sec        Allergies:  No Known Allergies    MEDICATIONS:  Antibiotics:    Neuro:  acetaminophen     Tablet .. 650 milliGRAM(s) Oral every 6 hours PRN  ondansetron Injectable 4 milliGRAM(s) IV Push every 6 hours PRN    Anticoagulation:    OTHER:  chlorhexidine 2% Cloths 1 Application(s) Topical <User Schedule>  influenza   Vaccine 0.5 milliLiter(s) IntraMuscular once  senna 2 Tablet(s) Oral at bedtime    IVF:  sodium chloride 0.9%. 1000 milliLiter(s) IV Continuous <Continuous>        ASSESSMENT:  31yo M with PMHx of anxiety who originally presented to [facility] with severe headache, nausea with emesis x 1 and diaphoresis and was found to have a right 6mm ICA aneurysm on CTA, admitted, now s/p lumbar puncture 2/28 and s/p cerebral angiogram with findings of 7.8 mm R Pcomm aneurysm 2/28. Preop for R crani for aneurysm clipping 3/1.     PLAN  NEURO  - Neuro/vitals q 1 hours  - Pain control PRN  - CTH and CTA head/neck completed  - preop for R crani for aneurysm clipping 3/1    PULM  - Satting well on room air     CARDIO  - SBP <140    GI  - NPO for OR  - bowel regimen  - Last BM 2/27     ENDO  - no active issues    RENAL  - IVF @ 100/hr while NPO  - voiding     HEME  - SCDs    ID  - Afebrile    Dispo: ICU status, full code, dispo pending     Assessment and plan discussed with Dr. Wiseman and Dr. Gomez     SUBJECTIVE:   33yo M with PMHx of anxiety who originally presented to [facility] with severe headache, nausea with emesis x 1 and diaphoresis and was found to have a right 6mm ICA aneurysm on CTA, admitted, now s/p lumbar puncture 2/28 and s/p cerebral angiogram with findings of 7.8 mm R Pcomm aneurysm 2/28.     Pt denies any complaints at this time.     HOSPITAL COURSE:  2/28: Admitted for further workup, CTH/CTA obtained, LP done, r/o xanthochromia, traumatic LP resulting in blood in all tubes. POD0 s/p cerebral angiogram with findings of 7.8mm R Pcomm aneurysm. Plan for OR in the morning for clipping of aneurysm.   3/1: NICK o/n neuro stable. OR today    Vital Signs Last 24 Hrs  T(C): 37.1 (01 Mar 2023 09:46), Max: 37.2 (28 Feb 2023 21:45)  T(F): 98.7 (01 Mar 2023 09:46), Max: 98.9 (28 Feb 2023 21:45)  HR: 71 (01 Mar 2023 11:00) (58 - 91)  BP: 106/61 (01 Mar 2023 10:00) (96/59 - 125/66)  BP(mean): 78 (01 Mar 2023 10:00) (73 - 98)  RR: 16 (01 Mar 2023 11:00) (11 - 26)  SpO2: 96% (01 Mar 2023 11:00) (93% - 100%)    Parameters below as of 01 Mar 2023 11:00  Patient On (Oxygen Delivery Method): room air    I&O's Summary    28 Feb 2023 07:01  -  01 Mar 2023 07:00  --------------------------------------------------------  IN: 1800 mL / OUT: 1550 mL / NET: 250 mL    01 Mar 2023 07:01  -  01 Mar 2023 11:21  --------------------------------------------------------  IN: 500 mL / OUT: 0 mL / NET: 500 mL    PHYSICAL EXAM:  General: Lying semifowler in bed. NAD. Appears calm with family.   HEENT: B/L PERRL 3mm. EOMI. Face symmetrical.   Neuro: AOx3. B/L upper and lower extremities 5/5 strength; sensation intact. No pronator drift.   Cardio: Normal rate/rhythm.   Pulm: Normal respiration rate. Normal chest wall rise.   Abd: Soft, nondistended, nontender.   Extremities: No edema.   Vascular: Pulses 2+ b/l DP, PT   Wound: right groin site dressed with steri strips and gauze; C/D/I. Soft, nontender to palpation. No hematoma noted.     LABS:                        14.3   16.46 )-----------( 241      ( 01 Mar 2023 05:30 )             42.4     03-01    138  |  105  |  13  ----------------------------<  108<H>  4.2   |  25  |  0.83    Ca    9.0      01 Mar 2023 05:30  Phos  2.8     03-01  Mg     2.0     03-01    TPro  6.4  /  Alb  4.1  /  TBili  0.8  /  DBili  x   /  AST  12  /  ALT  12  /  AlkPhos  93  02-27    PT/INR - ( 01 Mar 2023 05:30 )   PT: 14.2 sec;   INR: 1.19       PTT - ( 01 Mar 2023 05:30 )  PTT:26.6 sec        Allergies:  No Known Allergies    MEDICATIONS:  Antibiotics:    Neuro:  acetaminophen     Tablet .. 650 milliGRAM(s) Oral every 6 hours PRN  ondansetron Injectable 4 milliGRAM(s) IV Push every 6 hours PRN    Anticoagulation:    OTHER:  chlorhexidine 2% Cloths 1 Application(s) Topical <User Schedule>  influenza   Vaccine 0.5 milliLiter(s) IntraMuscular once  senna 2 Tablet(s) Oral at bedtime    IVF:  sodium chloride 0.9%. 1000 milliLiter(s) IV Continuous <Continuous>        ASSESSMENT:  33yo M with PMHx of anxiety who originally presented to [facility] with severe headache, nausea with emesis x 1 and diaphoresis and was found to have a right 6mm ICA aneurysm on CTA, admitted, now s/p lumbar puncture 2/28 and s/p cerebral angiogram with findings of 7.8 mm R Pcomm aneurysm 2/28. Preop for R crani for aneurysm clipping 3/1.     PLAN  NEURO  - Neuro/vitals q 1 hours  - Pain control PRN  - CTH and CTA head/neck completed  - preop for R crani for aneurysm clipping 3/1    PULM  - Satting well on room air     CARDIO  - SBP <140    GI  - NPO for OR  - bowel regimen  - Last BM 2/27     ENDO  - no active issues    RENAL  - IVF @ 100/hr while NPO  - voiding     HEME  - SCDs    ID  - Afebrile    Dispo: ICU status, full code, dispo pending     Assessment and plan discussed with Dr. Wiseman and Dr. Gomez

## 2023-03-01 NOTE — PRE-ANESTHESIA EVALUATION ADULT - NSANTHADDINFOFT_GEN_ALL_CORE
General anesthesia, potential blood transfusion, potential prolonged mechanical ventilation discussed with patient and family.  All questions answered.  Safety emphasized.

## 2023-03-01 NOTE — PROGRESS NOTE ADULT - SUBJECTIVE AND OBJECTIVE BOX
INTERVAL HISTORY: HPI:  Patient is a 33yo M with PMHx of anxiety who originally presented to [facility] with severe headache, nausea with emesis x 1 and diaphoresis and was found to have a right 6mm ICA aneurysm on CTA. Patient reports that this morning he woke up with a severe headache that was accompanied by an episode of nausea and vomiting. He also endorses that the headache was so severe he became extremely anxious, felt as if he was having a panic attack and became diaphoretic. He states that headaches are controlled from the pain medicine he received while at [place] and did not have any additional episodes of nausea/vomiting. Pending further workup with repeat CTH/CTA and possible cerebral angiogram     Denies visual changes, dizziness, cigarette smoking, family hx of brain aneurysm, previous recurrent headaches, changes in sensation, mental status, unilateral weakness, confusion. (27 Feb 2023 23:14)    PAST MEDICAL & SURGICAL HISTORY:  Anxiety  No significant past surgical history    REVIEW OF SYSTEMS: [ ] Unable to Assess due to neurologic exam   [x] All ROS addressed below are non-contributory, except:  Neuro: [ ] Headache [ ] Back pain [ ] Numbness [ ] Weakness [ ] Ataxia [ ] Dizziness [ ] Aphasia [ ] Dysarthria [ ] Visual disturbance  Resp: [ ] Shortness of breath/dyspnea, [ ] Orthopnea [ ] Cough  CV: [ ] Chest pain [ ] Palpitation [ ] Lightheadedness [ ] Syncope  Renal: [ ] Thirst [ ] Edema  GI: [ ] Nausea [ ] Emesis [ ] Abdominal pain [ ] Constipation [ ] Diarrhea  Hem: [ ] Hematemesis [ ] bright red blood per rectum  ID: [ ] Fever [ ] Chills [ ] Dysuria  ENT: [ ] Rhinorrhea    PHYSICAL EXAM:    General: No Acute Distress     Neurological: Awake, alert oriented to person, place and time, Following Commands, PERRL, EOMI, Face Symmetrical, Speech Fluent, Moving all extremities, Muscle Strength normal in all four extremities, No Drift, Sensation to Light Touch Intact    Pulmonary: Clear to Auscultation, No Rales, No Rhonchi, No Wheezes     Cardiovascular: S1, S2, Regular Rate and Rhythm     Gastrointestinal: Soft, Nontender, Nondistended     Extremities: No calf tenderness     Incision: R groin w/o hematoma            ICU Vital Signs Last 24 Hrs  T(C): 37.1 (01 Mar 2023 05:26), Max: 37.2 (28 Feb 2023 21:45)  T(F): 98.7 (01 Mar 2023 05:26), Max: 98.9 (28 Feb 2023 21:45)  HR: 75 (01 Mar 2023 09:00) (58 - 91)  BP: 108/56 (01 Mar 2023 08:20) (96/59 - 125/66)  BP(mean): 74 (01 Mar 2023 08:20) (73 - 98)  ABP: 116/62 (01 Mar 2023 09:00) (102/54 - 131/66)  ABP(mean): 81 (01 Mar 2023 09:00) (71 - 92)  RR: 24 (01 Mar 2023 09:00) (11 - 26)  SpO2: 95% (01 Mar 2023 09:00) (93% - 100%)      02-28-23 @ 07:01  -  03-01-23 @ 07:00  --------------------------------------------------------  IN: 1800 mL / OUT: 1550 mL / NET: 250 mL    03-01-23 @ 07:01  -  03-01-23 @ 09:33  --------------------------------------------------------  IN: 200 mL / OUT: 0 mL / NET: 200 mL            acetaminophen     Tablet .. 650 milliGRAM(s) Oral every 6 hours PRN  chlorhexidine 2% Cloths 1 Application(s) Topical <User Schedule>  influenza   Vaccine 0.5 milliLiter(s) IntraMuscular once  ondansetron Injectable 4 milliGRAM(s) IV Push every 6 hours PRN  senna 2 Tablet(s) Oral at bedtime  sodium chloride 0.9%. 1000 milliLiter(s) (100 mL/Hr) IV Continuous <Continuous>      LABS:  Na: 138 (03-01 @ 05:30), 139 (02-28 @ 14:36), 138 (02-28 @ 05:30), 138 (02-27 @ 23:46)  K: 4.2 (03-01 @ 05:30), 4.2 (02-28 @ 14:36), 4.0 (02-28 @ 05:30), 3.8 (02-27 @ 23:46)  Cl: 105 (03-01 @ 05:30), 107 (02-28 @ 14:36), 104 (02-28 @ 05:30), 104 (02-27 @ 23:46)  CO2: 25 (03-01 @ 05:30), 24 (02-28 @ 14:36), 24 (02-28 @ 05:30), 23 (02-27 @ 23:46)  BUN: 13 (03-01 @ 05:30), 13 (02-28 @ 14:36), 13 (02-28 @ 05:30), 13 (02-27 @ 23:46)  Cr: 0.83 (03-01 @ 05:30), 0.88 (02-28 @ 14:36), 0.85 (02-28 @ 05:30), 0.85 (02-27 @ 23:46)  Glu: 108(03-01 @ 05:30), 109(02-28 @ 14:36), 112(02-28 @ 05:30), 107(02-27 @ 23:46)    Hgb: 14.3 (03-01 @ 05:30), 13.8 (02-28 @ 14:36), 14.5 (02-28 @ 05:30), 14.6 (02-27 @ 23:46)  Hct: 42.4 (03-01 @ 05:30), 40.6 (02-28 @ 14:36), 42.5 (02-28 @ 05:30), 42.3 (02-27 @ 23:46)  WBC: 16.46 (03-01 @ 05:30), 8.51 (02-28 @ 14:36), 10.81 (02-28 @ 05:30), 12.21 (02-27 @ 23:46)  Plt: 241 (03-01 @ 05:30), 229 (02-28 @ 14:36), 234 (02-28 @ 05:30), 227 (02-27 @ 23:46)    INR: 1.19 03-01-23 @ 05:30, 1.18 02-28-23 @ 14:36, 1.07 02-27-23 @ 23:46  PTT: 26.6 03-01-23 @ 05:30, 26.8 02-28-23 @ 14:36, 31.6 02-27-23 @ 23:46      LIVER FUNCTIONS - ( 27 Feb 2023 23:46 )  Alb: 4.1 g/dL / Pro: 6.4 g/dL / ALK PHOS: 93 U/L / ALT: 12 U/L / AST: 12 U/L / GGT: x             RADIOLOGY & ADDITIONAL STUDIES:  < from: CT Angio Neck w/ IV Cont (02.28.23 @ 01:15) >  Intracranial CTA: 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    1 mm superiorly directed outpouching of the paraclinoid right ICA which   may represent origin of the ophthalmic artery versus small aneurysm..    Extracranial CTA: No steno-occlusive disease.    < end of copied text >

## 2023-03-01 NOTE — PROCEDURE NOTE - ADDITIONAL PROCEDURE DETAILS
Pt already under sedation s/p aneurysm clipping. Lubrication applied to tip of [place] NGT. NGT passed through right naris without resistance and inserted to 55 sheron. NGT secured in place by tape. CXR ordered to confirm placement.

## 2023-03-01 NOTE — PROGRESS NOTE ADULT - SUBJECTIVE AND OBJECTIVE BOX
=================================  NEUROCRITICAL CARE ATTENDING NOTE  =================================    JUANITO MASON   MRN-[id number]  Summary:  32y/M with PMHx of anxiety who originally presented to [facility] with severe headache, nausea with emesis x 1 and diaphoresis and was found to have a right 6mm ICA aneurysm on CTA. Patient reports that this morning he woke up with a severe headache that was accompanied by an episode of nausea and vomiting. He also endorses that the headache was so severe he became extremely anxious, felt as if he was having a panic attack and became diaphoretic. He states that headaches are controlled from the pain medicine he received while at [place] and did not have any additional episodes of nausea/vomiting. Pending further workup with repeat CTH/CTA and possible cerebral angiogram  Denies visual changes, dizziness, cigarette smoking, family hx of brain aneurysm, previous recurrent headaches, changes in sensation, mental status, unilateral weakness, confusion. (2023 23:14)    COURSE IN THE HOSPITAL:   admitted to [facility], s/p angio, unable to coil aneurysm      Past Medical History: Anxiety  Allergies:  No Known Allergies  Home meds:     PHYSICAL EXAMINATION  T(C): 37.4 ( @ 14:37), Max: 37.4 ( @ 14:37) HR: 73 ( @ 15:00) (63 - 91) BP: 116/68 ( @ 14:05) (106/61 - 125/66) RR: 21 ( @ 15:00) (15 - 28) SpO2: 99% ( @ 15:00) (93% - 99%)   NEUROLOGIC EXAMINATION:  Patient is awake, alert, fully oriented, pupils 2-3mm equal and briskly reactive to light, EOMs intact, muscle strength 5/5 on all 4s.  GENERAL: not intubated, not in cardiorespiratory distress  EENT:  anicteric  CARDIOVASCULAR: (+) S1 S2, normal rate and regular rhythm  PULMONARY: clear to auscultation bilaterally  ABDOMEN: soft, nontender with normoactive bowel sounds  EXTREMITIES: no edema  SKIN: no rash    LABS:     (8.51)  14.3 (13.8)  16.46 )-----------( 241      ( 01 Mar 2023 05:30 )             42.4     138  |  105  |  13  ----------------------------<  108<H>  4.2   |  25  |  0.83    Ca    9.0      01 Mar 2023 05:30  Phos  2.8       Mg     2.0         TPro  6.4  /  Alb  4.1  /  TBili  0.8  /  DBili  x   /  AST  12  /  ALT  12  /  AlkPhos  93   @ 07:01  -   @ 07:00  IN: 1800 mL / OUT: 1550 mL / NET: 250 mL    Bacteriology:  CSF studies:  CSF STUDIES    G   P   L   *** [id number],000 WBC22 *** %N   %L5     L2.0 *** [id number],500 WBC22 *** %N   %L6     EEG:  Neuroimaging:  Other imaging:    MEDICATIONS:     ·	senna 2 Oral at bedtime  ·	acetaminophen     Tablet .. 650 Oral every 6 hours PRN  ·	ondansetron Injectable 4 IV Push every 6 hours PRN     IV FLUIDS: IVL  DRIPS:  DIET: regular diet, NPO after midnight   Lines: A Line   Drains:    Wounds:    CODE STATUS:  Full Code                       GOALS OF CARE:  aggressive                      DISPOSITION:  ICU =================================  NEUROCRITICAL CARE ATTENDING NOTE  =================================    JUANITO MASON   MRN-[id number]  Summary:  32y/M with PMHx of anxiety who originally presented to [facility] with severe headache, nausea with emesis x 1 and diaphoresis and was found to have a right 6mm ICA aneurysm on CTA. Patient reports that this morning he woke up with a severe headache that was accompanied by an episode of nausea and vomiting. He also endorses that the headache was so severe he became extremely anxious, felt as if he was having a panic attack and became diaphoretic. He states that headaches are controlled from the pain medicine he received while at [place] and did not have any additional episodes of nausea/vomiting. Pending further workup with repeat CTH/CTA and possible cerebral angiogram  Denies visual changes, dizziness, cigarette smoking, family hx of brain aneurysm, previous recurrent headaches, changes in sensation, mental status, unilateral weakness, confusion. (2023 23:14)    COURSE IN THE HOSPITAL:   admitted to [facility], s/p angio, unable to coil aneurysm   s/p clipping, 1L EBL given 2 units pRBC introperatively, remained intubated    Past Medical History: Anxiety  Allergies:  No Known Allergies  Home meds:     PHYSICAL EXAMINATION  T(C): 37.4 ( @ 14:37), Max: 37.4 ( @ 14:37) HR: 73 ( @ 15:00) (63 - 91) BP: 116/68 ( @ 14:05) (106/61 - 125/66) RR: 21 ( @ 15:00) (15 - 28) SpO2: 99% ( @ 15:00) (93% - 99%)   NEUROLOGIC EXAMINATION:  Patient is awake, alert, fully oriented, pupils 2-3mm equal and briskly reactive to light, EOMs intact, muscle strength 5/5 on all 4s.  GENERAL: not intubated, not in cardiorespiratory distress  EENT:  anicteric  CARDIOVASCULAR: (+) S1 S2, normal rate and regular rhythm  PULMONARY: clear to auscultation bilaterally  ABDOMEN: soft, nontender with normoactive bowel sounds  EXTREMITIES: no edema  SKIN: no rash    LABS:     (8.51)  14.3 (13.8)  16.46 )-----------( 241      ( 01 Mar 2023 05:30 )             42.4     138  |  105  |  13  ----------------------------<  108<H>  4.2   |  25  |  0.83    Ca    9.0      01 Mar 2023 05:30  Phos  2.8       Mg     2.0         TPro  6.4  /  Alb  4.1  /  TBili  0.8  /  DBili  x   /  AST  12  /  ALT  12  /  AlkPhos  93   @ 07:01  -   @ 07:00  IN: 1800 mL / OUT: 1550 mL / NET: 250 mL    Bacteriology:  CSF studies:  CSF STUDIES    G   P   L   *** [id number],000 WBC22 *** %N   %L5     L2.0 *** GID901,500 WBC22 *** %N   %L6     EEG:  Neuroimaging:  Other imaging:    MEDICATIONS:     ·	senna 2 Oral at bedtime  ·	acetaminophen     Tablet .. 650 Oral every 6 hours PRN  ·	ondansetron Injectable 4 IV Push every 6 hours PRN     IV FLUIDS: NS@100  DRIPS:  DIET: NPO   Lines: A Line   Drains:    Wounds:    CODE STATUS:  Full Code                       GOALS OF CARE:  aggressive                      DISPOSITION:  ICU =================================  NEUROCRITICAL CARE ATTENDING NOTE  =================================    JUANITO MASON   MRN-[id number]  Summary:  32y/M with PMHx of anxiety who originally presented to [facility] with severe headache, nausea with emesis x 1 and diaphoresis and was found to have a right 6mm ICA aneurysm on CTA. Patient reports that this morning he woke up with a severe headache that was accompanied by an episode of nausea and vomiting. He also endorses that the headache was so severe he became extremely anxious, felt as if he was having a panic attack and became diaphoretic. He states that headaches are controlled from the pain medicine he received while at [place] and did not have any additional episodes of nausea/vomiting. Pending further workup with repeat CTH/CTA and possible cerebral angiogram  Denies visual changes, dizziness, cigarette smoking, family hx of brain aneurysm, previous recurrent headaches, changes in sensation, mental status, unilateral weakness, confusion. (2023 23:14)    COURSE IN THE HOSPITAL:   admitted to [facility], s/p angio, unable to coil aneurysm   s/p clipping, 1L EBL given 2 units pRBC introperatively, remained intubated; intraop - aneurysm appears to be s/p rupture (consider  ictus, now BD#3)    Past Medical History: Anxiety  Allergies:  No Known Allergies  Home meds:     PHYSICAL EXAMINATION  T(C): 37.4 ( @ 14:37), Max: 37.4 ( @ 14:37) HR: 73 ( @ 15:00) (63 - 91) BP: 116/68 ( @ 14:05) (106/61 - 125/66) RR: 21 ( @ 15:00) (15 - 28) SpO2: 99% ( @ 15:00) (93% - 99%)   NEUROLOGIC EXAMINATION:  preop exam: awake, alert, fully oriented, pupils 2-3mm equal and briskly reactive to light, EOMs intact, muscle strength 5/5 on all 4s.  GENERAL: intubated  EENT:  anicteric  CARDIOVASCULAR: (+) S1 S2, normal rate and regular rhythm  PULMONARY: clear to auscultation bilaterally  ABDOMEN: soft, nontender with normoactive bowel sounds  EXTREMITIES: no edema  SKIN: no rash    LABS:     (8.51)  14.3 (13.8)  16.46 )-----------( 241      ( 01 Mar 2023 05:30 )             42.4     138  |  105  |  13  ----------------------------<  108<H>  4.2   |  25  |  0.83    Ca    9.0      01 Mar 2023 05:30  Phos  2.8       Mg     2.0         TPro  6.4  /  Alb  4.1  /  TBili  0.8  /  DBili  x   /  AST  12  /  ALT  12  /  AlkPhos  93   @ 07:01  -   @ 07:00  IN: 1800 mL / OUT: 1550 mL / NET: 250 mL    Bacteriology:  CSF studies:  CSF STUDIES    G   P   L   *** [id number],000 WBC22 *** %N   %L5     L2.0 *** NTU355,500 WBC22 *** %N   %L6     EEG:  Neuroimaging:  Other imaging:    MEDICATIONS:     ·	senna 2 Oral at bedtime  ·	acetaminophen     Tablet .. 650 Oral every 6 hours PRN  ·	ondansetron Injectable 4 IV Push every 6 hours PRN     IV FLUIDS: NS@100  DRIPS:  DIET: NPO   Lines: A Line   Drains:    Wounds:    CODE STATUS:  Full Code                       GOALS OF CARE:  aggressive                      DISPOSITION:  ICU

## 2023-03-01 NOTE — PROGRESS NOTE ADULT - SUBJECTIVE AND OBJECTIVE BOX
NEUROSURGERY POST OP NOTE:    POD# 0 S/P R pteronial craniotomy, clipping R PCOMM aneurysm, intraoperative aneurysm rupture    S: intubated      T(C): 37.1 (03-01-23 @ 22:20), Max: 37.4 (03-01-23 @ 14:37)  HR: 73 (03-01-23 @ 15:00) (63 - 82)  BP: 116/68 (03-01-23 @ 14:05) (106/61 - 116/68)  RR: 21 (03-01-23 @ 15:00) (15 - 28)  SpO2: 99% (03-01-23 @ 15:00) (93% - 99%)      02-28-23 @ 07:01  -  03-01-23 @ 07:00  --------------------------------------------------------  IN: 1800 mL / OUT: 1550 mL / NET: 250 mL    03-01-23 @ 07:01  -  03-02-23 @ 01:08  --------------------------------------------------------  IN: 800 mL / OUT: 450 mL / NET: 350 mL        acetaminophen     Tablet .. 650 milliGRAM(s) Oral every 6 hours PRN  ceFAZolin   IVPB 2000 milliGRAM(s) IV Intermittent every 8 hours  chlorhexidine 0.12% Liquid 15 milliLiter(s) Oral Mucosa every 12 hours  chlorhexidine 2% Cloths 1 Application(s) Topical <User Schedule>  dexAMETHasone  Injectable 4 milliGRAM(s) IV Push every 6 hours  hydrALAZINE Injectable 10 milliGRAM(s) IV Push every 4 hours PRN  influenza   Vaccine 0.5 milliLiter(s) IntraMuscular once  levETIRAcetam  IVPB 1000 milliGRAM(s) IV Intermittent every 12 hours  niMODipine 60 milliGRAM(s) Oral every 4 hours  norepinephrine Infusion 0.05 MICROgram(s)/kG/Min IV Continuous <Continuous>  ondansetron Injectable 4 milliGRAM(s) IV Push every 6 hours PRN  pantoprazole  Injectable 40 milliGRAM(s) IV Push daily  polyethylene glycol 3350 17 Gram(s) Oral daily  senna 2 Tablet(s) Oral at bedtime  sodium chloride 0.9%. 1000 milliLiter(s) IV Continuous <Continuous>      RADIOLOGY:     Exam:  GEN: laying in bed, NAD  NEURO: not alert. does not FC, does not OE, face symmetric. CNII-XII intact. PERRL, w/d x4  CV: RRR +S1/S2  PULM: CTAB  GI: Abd soft, NT/ND  EXT: ext warm, dry, nontender  WOUND/DRAINS: headwrap c/d/i. JPx1    DEVICES:       Assessment: Patient is a 31yo M with PMHx of anxiety who originally presented to [facility] with severe headache, nausea with emesis x 1 and diaphoresis and was found to have a right 6mm ICA aneurysm on CTA, admitted, now s/p lumbar puncture 2/28 and s/p cerebral angiogram with findings of 7.8 mm R Pcomm aneurysm 2/28. now s/p R pteronial crani for R PCOMM clipping, intraoperative aneurysm rupture (3/1).      Plan:  NEURO  - Neuro/vitals q 1 hours  - Pain control PRN  - propofol gtt sedation  - post op CTH stable  - subgaleal ALONA x1, monitor output  - keppra 1g bid   - decadron 3 day taper   - pending CTA 3/2 am  - stroke core measures  - nimodipine 60q4    PULM  - intubated, full vent support     CARDIO  - SBP   - levo prn     GI  - NPO   - bowel regimen  - Last BM 2/27   - zofran prn nausea  - protonix while on steroids    ENDO  - no active issues    RENAL  - IVF @ 100/hr while NPO  - +canas   - euvolemia goal     HEME  - SCDs    ID  - Afebrile  - post op ancef    Dispo: ICU status, full code, dispo pending     Assessment and plan discussed with Dr. Wiseman and Dr. Mckeon    Assessment:  Present when checked    []  GCS  E   V  M     Heart Failure: []Acute, [] acute on chronic , []chronic  Heart Failure:  [] Diastolic (HFpEF), [] Systolic (HFrEF), []Combined (HFpEF and HFrEF), [] RHF, [] Pulm HTN, [] Other    [] EDISON, [] ATN, [] AIN, [] other  [] CKD1, [] CKD2, [] CKD 3, [] CKD 4, [] CKD 5, []ESRD    Encephalopathy: [] Metabolic, [] Hepatic, [] toxic, [] Neurological, [] Other    Abnormal Nurtitional Status: [] malnurtition (see nutrition note), [ ]underweight: BMI < 19, [] morbid obesity: BMI >40, [] Cachexia    [] Sepsis  [] hypovolemic shock,[] cardiogenic shock, [] hemorrhagic shock, [] neuogenic shock  [] Acute Respiratory Failure  []Cerebral edema, [] Brain compression/ herniation,   [] Functional quadriplegia  [] Acute blood loss anemia

## 2023-03-01 NOTE — PROGRESS NOTE ADULT - NSPROGADDITIONALINFOA_GEN_ALL_CORE
I have personally reviewed the above clinical note and all of its components and:  [ ] agree with the above assessment and plan without modifications.  [X] agree with the above with modifications made to the assessment and/or plan of care.  [ ] disagree with the above with significant modifications as listed below:

## 2023-03-01 NOTE — PROGRESS NOTE ADULT - ASSESSMENT
Assessment: 32M found to have PCOMM s/p DSA w/o intervention pending open crani for clipping 3/1    NEURO:  unruptured cerebral aneurysm s/p DSA pending OR crani clipping today  s/p LP w/ traumatic tap   -neuro check q1  -pain management w/ tylenol & oxycodone  -PT/OT     PULMONARY:  saturating well on RA,   -continue to monitor on pulse o2   -incentive spirometry 10q/hr when awake     CARDIOVASCULAR:  monitor on telemetry   vitals q1  sbp goal 100-140  [place] placed 2/28    GASTROENTEROLOGY:   ensure BMs w/ Miralax & senna  Daily stool count, LBM prior to arrival 2/17  NPO for crani/clipping    RENAL/:  -check BMP qd  -strict i/o's ;   -Na goal 135-145  c/w IVF NS @100cc/hr given s/p DSA & NPO    ENDOCRINE:  Monitor FSG q6 hrs while NPO  FSG goal 120-180    HEME/ONC:  DVT ppx: will hold chemical dvt ppx in setting of recent procedure & pending OR today  b/l SCDs    INFECTIOUS:   Monitor for fevers     Patient is critically ill, requiring critical care services.     I have personally and independently provided [ 35 ] minutes of critical care services.  This excludes any time spent on separate procedures or teaching.

## 2023-03-02 LAB
A1C WITH ESTIMATED AVERAGE GLUCOSE RESULT: 5.3 % — SIGNIFICANT CHANGE UP (ref 4–5.6)
ANION GAP SERPL CALC-SCNC: 8 MMOL/L — SIGNIFICANT CHANGE UP (ref 5–17)
BUN SERPL-MCNC: 12 MG/DL — SIGNIFICANT CHANGE UP (ref 7–23)
CALCIUM SERPL-MCNC: 7.7 MG/DL — LOW (ref 8.4–10.5)
CHLORIDE SERPL-SCNC: 106 MMOL/L — SIGNIFICANT CHANGE UP (ref 96–108)
CHOLEST SERPL-MCNC: 117 MG/DL — SIGNIFICANT CHANGE UP
CO2 SERPL-SCNC: 21 MMOL/L — LOW (ref 22–31)
CREAT SERPL-MCNC: 0.82 MG/DL — SIGNIFICANT CHANGE UP (ref 0.5–1.3)
EGFR: 120 ML/MIN/1.73M2 — SIGNIFICANT CHANGE UP
ESTIMATED AVERAGE GLUCOSE: 105 MG/DL — SIGNIFICANT CHANGE UP (ref 68–114)
GLUCOSE SERPL-MCNC: 134 MG/DL — HIGH (ref 70–99)
HCT VFR BLD CALC: 36.7 % — LOW (ref 39–50)
HDLC SERPL-MCNC: 33 MG/DL — LOW
HGB BLD-MCNC: 12.5 G/DL — LOW (ref 13–17)
LIPID PNL WITH DIRECT LDL SERPL: 69 MG/DL — SIGNIFICANT CHANGE UP
MAGNESIUM SERPL-MCNC: 2.4 MG/DL — SIGNIFICANT CHANGE UP (ref 1.6–2.6)
MCHC RBC-ENTMCNC: 31.4 PG — SIGNIFICANT CHANGE UP (ref 27–34)
MCHC RBC-ENTMCNC: 34.1 GM/DL — SIGNIFICANT CHANGE UP (ref 32–36)
MCV RBC AUTO: 92.2 FL — SIGNIFICANT CHANGE UP (ref 80–100)
NON HDL CHOLESTEROL: 84 MG/DL — SIGNIFICANT CHANGE UP
NRBC # BLD: 0 /100 WBCS — SIGNIFICANT CHANGE UP (ref 0–0)
PHOSPHATE SERPL-MCNC: 3.8 MG/DL — SIGNIFICANT CHANGE UP (ref 2.5–4.5)
PLATELET # BLD AUTO: 220 K/UL — SIGNIFICANT CHANGE UP (ref 150–400)
POTASSIUM SERPL-MCNC: 4.2 MMOL/L — SIGNIFICANT CHANGE UP (ref 3.5–5.3)
POTASSIUM SERPL-SCNC: 4.2 MMOL/L — SIGNIFICANT CHANGE UP (ref 3.5–5.3)
RBC # BLD: 3.98 M/UL — LOW (ref 4.2–5.8)
RBC # FLD: 14.6 % — HIGH (ref 10.3–14.5)
SODIUM SERPL-SCNC: 135 MMOL/L — SIGNIFICANT CHANGE UP (ref 135–145)
TRIGL SERPL-MCNC: 74 MG/DL — SIGNIFICANT CHANGE UP
TSH SERPL-MCNC: 0.6 UIU/ML — SIGNIFICANT CHANGE UP (ref 0.27–4.2)
WBC # BLD: 19.6 K/UL — HIGH (ref 3.8–10.5)
WBC # FLD AUTO: 19.6 K/UL — HIGH (ref 3.8–10.5)

## 2023-03-02 PROCEDURE — 99024 POSTOP FOLLOW-UP VISIT: CPT

## 2023-03-02 PROCEDURE — 99291 CRITICAL CARE FIRST HOUR: CPT

## 2023-03-02 PROCEDURE — 70450 CT HEAD/BRAIN W/O DYE: CPT | Mod: 26,59

## 2023-03-02 PROCEDURE — 70496 CT ANGIOGRAPHY HEAD: CPT | Mod: 26

## 2023-03-02 RX ORDER — NIMODIPINE 60 MG/10ML
60 SOLUTION ORAL EVERY 4 HOURS
Refills: 0 | Status: DISCONTINUED | OUTPATIENT
Start: 2023-03-02 | End: 2023-03-03

## 2023-03-02 RX ORDER — DEXAMETHASONE 0.5 MG/5ML
1 ELIXIR ORAL EVERY 6 HOURS
Refills: 0 | Status: COMPLETED | OUTPATIENT
Start: 2023-03-03 | End: 2023-03-04

## 2023-03-02 RX ORDER — HYDRALAZINE HCL 50 MG
10 TABLET ORAL EVERY 4 HOURS
Refills: 0 | Status: DISCONTINUED | OUTPATIENT
Start: 2023-03-02 | End: 2023-03-03

## 2023-03-02 RX ORDER — ATROPINE SULFATE 0.1 MG/ML
1 SYRINGE (ML) INJECTION ONCE
Refills: 0 | Status: COMPLETED | OUTPATIENT
Start: 2023-03-02 | End: 2023-03-01

## 2023-03-02 RX ORDER — PHENYLEPHRINE HYDROCHLORIDE 10 MG/ML
0.1 INJECTION INTRAVENOUS
Qty: 40 | Refills: 0 | Status: DISCONTINUED | OUTPATIENT
Start: 2023-03-02 | End: 2023-03-02

## 2023-03-02 RX ORDER — ACETAMINOPHEN 500 MG
650 TABLET ORAL EVERY 6 HOURS
Refills: 0 | Status: DISCONTINUED | OUTPATIENT
Start: 2023-03-02 | End: 2023-03-04

## 2023-03-02 RX ORDER — SODIUM CHLORIDE 9 MG/ML
500 INJECTION INTRAMUSCULAR; INTRAVENOUS; SUBCUTANEOUS ONCE
Refills: 0 | Status: DISCONTINUED | OUTPATIENT
Start: 2023-03-02 | End: 2023-03-02

## 2023-03-02 RX ORDER — HYDROMORPHONE HYDROCHLORIDE 2 MG/ML
0.5 INJECTION INTRAMUSCULAR; INTRAVENOUS; SUBCUTANEOUS ONCE
Refills: 0 | Status: DISCONTINUED | OUTPATIENT
Start: 2023-03-02 | End: 2023-03-02

## 2023-03-02 RX ORDER — DEXMEDETOMIDINE HYDROCHLORIDE IN 0.9% SODIUM CHLORIDE 4 UG/ML
0.2 INJECTION INTRAVENOUS
Qty: 400 | Refills: 0 | Status: DISCONTINUED | OUTPATIENT
Start: 2023-03-02 | End: 2023-03-02

## 2023-03-02 RX ORDER — LEVETIRACETAM 250 MG/1
1000 TABLET, FILM COATED ORAL
Refills: 0 | Status: DISCONTINUED | OUTPATIENT
Start: 2023-03-02 | End: 2023-03-05

## 2023-03-02 RX ORDER — PROPOFOL 10 MG/ML
10 INJECTION, EMULSION INTRAVENOUS
Qty: 1000 | Refills: 0 | Status: DISCONTINUED | OUTPATIENT
Start: 2023-03-02 | End: 2023-03-02

## 2023-03-02 RX ORDER — DEXAMETHASONE 0.5 MG/5ML
ELIXIR ORAL
Refills: 0 | Status: DISCONTINUED | OUTPATIENT
Start: 2023-03-02 | End: 2023-03-02

## 2023-03-02 RX ORDER — TRAMADOL HYDROCHLORIDE 50 MG/1
100 TABLET ORAL EVERY 6 HOURS
Refills: 0 | Status: DISCONTINUED | OUTPATIENT
Start: 2023-03-02 | End: 2023-03-06

## 2023-03-02 RX ORDER — DEXAMETHASONE 0.5 MG/5ML
2 ELIXIR ORAL EVERY 6 HOURS
Refills: 0 | Status: COMPLETED | OUTPATIENT
Start: 2023-03-02 | End: 2023-03-03

## 2023-03-02 RX ORDER — NIMODIPINE 60 MG/10ML
30 SOLUTION ORAL
Refills: 0 | Status: DISCONTINUED | OUTPATIENT
Start: 2023-03-02 | End: 2023-03-02

## 2023-03-02 RX ORDER — NOREPINEPHRINE BITARTRATE/D5W 8 MG/250ML
0.05 PLASTIC BAG, INJECTION (ML) INTRAVENOUS
Qty: 8 | Refills: 0 | Status: DISCONTINUED | OUTPATIENT
Start: 2023-03-02 | End: 2023-03-02

## 2023-03-02 RX ORDER — PANTOPRAZOLE SODIUM 20 MG/1
40 TABLET, DELAYED RELEASE ORAL
Refills: 0 | Status: DISCONTINUED | OUTPATIENT
Start: 2023-03-02 | End: 2023-03-04

## 2023-03-02 RX ORDER — METOCLOPRAMIDE HCL 10 MG
10 TABLET ORAL ONCE
Refills: 0 | Status: COMPLETED | OUTPATIENT
Start: 2023-03-02 | End: 2023-03-02

## 2023-03-02 RX ORDER — TRAMADOL HYDROCHLORIDE 50 MG/1
100 TABLET ORAL ONCE
Refills: 0 | Status: DISCONTINUED | OUTPATIENT
Start: 2023-03-02 | End: 2023-03-02

## 2023-03-02 RX ORDER — DEXAMETHASONE 0.5 MG/5ML
4 ELIXIR ORAL EVERY 6 HOURS
Refills: 0 | Status: DISCONTINUED | OUTPATIENT
Start: 2023-03-02 | End: 2023-03-02

## 2023-03-02 RX ORDER — DEXAMETHASONE 0.5 MG/5ML
ELIXIR ORAL
Refills: 0 | Status: COMPLETED | OUTPATIENT
Start: 2023-03-02 | End: 2023-03-04

## 2023-03-02 RX ORDER — OXYCODONE HYDROCHLORIDE 5 MG/1
5 TABLET ORAL EVERY 6 HOURS
Refills: 0 | Status: DISCONTINUED | OUTPATIENT
Start: 2023-03-02 | End: 2023-03-03

## 2023-03-02 RX ADMIN — CHLORHEXIDINE GLUCONATE 15 MILLILITER(S): 213 SOLUTION TOPICAL at 05:48

## 2023-03-02 RX ADMIN — DEXMEDETOMIDINE HYDROCHLORIDE IN 0.9% SODIUM CHLORIDE 4.75 MICROGRAM(S)/KG/HR: 4 INJECTION INTRAVENOUS at 01:59

## 2023-03-02 RX ADMIN — OXYCODONE HYDROCHLORIDE 5 MILLIGRAM(S): 5 TABLET ORAL at 13:25

## 2023-03-02 RX ADMIN — OXYCODONE HYDROCHLORIDE 5 MILLIGRAM(S): 5 TABLET ORAL at 17:47

## 2023-03-02 RX ADMIN — Medication 4 MILLIGRAM(S): at 11:39

## 2023-03-02 RX ADMIN — TRAMADOL HYDROCHLORIDE 100 MILLIGRAM(S): 50 TABLET ORAL at 21:13

## 2023-03-02 RX ADMIN — Medication 4 MILLIGRAM(S): at 00:47

## 2023-03-02 RX ADMIN — PROPOFOL 5.7 MICROGRAM(S)/KG/MIN: 10 INJECTION, EMULSION INTRAVENOUS at 06:05

## 2023-03-02 RX ADMIN — SENNA PLUS 2 TABLET(S): 8.6 TABLET ORAL at 00:47

## 2023-03-02 RX ADMIN — TRAMADOL HYDROCHLORIDE 100 MILLIGRAM(S): 50 TABLET ORAL at 22:00

## 2023-03-02 RX ADMIN — NIMODIPINE 60 MILLIGRAM(S): 60 SOLUTION ORAL at 02:07

## 2023-03-02 RX ADMIN — PHENYLEPHRINE HYDROCHLORIDE 3.56 MICROGRAM(S)/KG/MIN: 10 INJECTION INTRAVENOUS at 02:01

## 2023-03-02 RX ADMIN — POLYETHYLENE GLYCOL 3350 17 GRAM(S): 17 POWDER, FOR SOLUTION ORAL at 11:38

## 2023-03-02 RX ADMIN — HYDROMORPHONE HYDROCHLORIDE 0.5 MILLIGRAM(S): 2 INJECTION INTRAMUSCULAR; INTRAVENOUS; SUBCUTANEOUS at 06:06

## 2023-03-02 RX ADMIN — Medication 100 MILLIGRAM(S): at 05:48

## 2023-03-02 RX ADMIN — OXYCODONE HYDROCHLORIDE 5 MILLIGRAM(S): 5 TABLET ORAL at 18:30

## 2023-03-02 RX ADMIN — Medication 100 MILLIGRAM(S): at 14:15

## 2023-03-02 RX ADMIN — Medication 650 MILLIGRAM(S): at 09:15

## 2023-03-02 RX ADMIN — LEVETIRACETAM 1000 MILLIGRAM(S): 250 TABLET, FILM COATED ORAL at 17:47

## 2023-03-02 RX ADMIN — Medication 2 MILLIGRAM(S): at 23:42

## 2023-03-02 RX ADMIN — Medication 25 GRAM(S): at 00:36

## 2023-03-02 RX ADMIN — CHLORHEXIDINE GLUCONATE 1 APPLICATION(S): 213 SOLUTION TOPICAL at 05:49

## 2023-03-02 RX ADMIN — Medication 650 MILLIGRAM(S): at 08:38

## 2023-03-02 RX ADMIN — PANTOPRAZOLE SODIUM 40 MILLIGRAM(S): 20 TABLET, DELAYED RELEASE ORAL at 11:39

## 2023-03-02 RX ADMIN — LEVETIRACETAM 400 MILLIGRAM(S): 250 TABLET, FILM COATED ORAL at 05:48

## 2023-03-02 RX ADMIN — OXYCODONE HYDROCHLORIDE 5 MILLIGRAM(S): 5 TABLET ORAL at 23:42

## 2023-03-02 RX ADMIN — Medication 2 MILLIGRAM(S): at 17:47

## 2023-03-02 RX ADMIN — Medication 8.91 MICROGRAM(S)/KG/MIN: at 23:00

## 2023-03-02 RX ADMIN — Medication 4 MILLIGRAM(S): at 06:19

## 2023-03-02 RX ADMIN — ONDANSETRON 4 MILLIGRAM(S): 8 TABLET, FILM COATED ORAL at 00:00

## 2023-03-02 RX ADMIN — HYDROMORPHONE HYDROCHLORIDE 0.5 MILLIGRAM(S): 2 INJECTION INTRAMUSCULAR; INTRAVENOUS; SUBCUTANEOUS at 05:45

## 2023-03-02 RX ADMIN — OXYCODONE HYDROCHLORIDE 5 MILLIGRAM(S): 5 TABLET ORAL at 11:38

## 2023-03-02 RX ADMIN — Medication 10 MILLIGRAM(S): at 01:53

## 2023-03-02 NOTE — PROGRESS NOTE ADULT - SUBJECTIVE AND OBJECTIVE BOX
HPI:  Patient is a 33yo M with PMHx of anxiety who originally presented to [facility] with severe headache, nausea with emesis x 1 and diaphoresis and was found to have a right 6mm ICA aneurysm on CTA. Patient reports that this morning he woke up with a severe headache that was accompanied by an episode of nausea and vomiting. He also endorses that the headache was so severe he became extremely anxious, felt as if he was having a panic attack and became diaphoretic. He states that headaches are controlled from the pain medicine he received while at [place] and did not have any additional episodes of nausea/vomiting. Pending further workup with repeat CTH/CTA and possible cerebral angiogram     Denies visual changes, dizziness, cigarette smoking, family hx of brain aneurysm, previous recurrent headaches, changes in sensation, mental status, unilateral weakness, confusion. (27 Feb 2023 23:14)      Hospital Course:   2/28: Admitted for further workup, CTH/CTA obtained, LP done, r/o xanthochromia, traumatic LP resulting in blood in all tubes. POD0 s/p cerebral angiogram with findings of 7.8mm R Pcomm aneurysm. Plan for OR in the morning for clipping of aneurysm.   3/1: NICK o/n neuro stable. POD0 R crani for R pcomm aneurysm clipping c/b intraoperative aneurysm rupture, EBL 1L. post op CTH stable. remains intubated post op sedated on propofol/precedex. on thanh gtt. hypotensive 80s, salma 30s, given 1L NS and 1mg atropine. sedation held, thanh changed to levo gtt. restarted on propofol for sedation  3/2: POD1. neuro stable. remains off sedation. CTA in am    Vital Signs Last 24 Hrs  T(C): 37 (02 Mar 2023 01:09), Max: 37.4 (01 Mar 2023 14:37)  T(F): 98.6 (02 Mar 2023 01:09), Max: 99.3 (01 Mar 2023 14:37)  HR: 73 (01 Mar 2023 15:00) (63 - 82)  BP: 116/68 (01 Mar 2023 14:05) (106/61 - 116/68)  BP(mean): 87 (01 Mar 2023 14:05) (74 - 87)  RR: 21 (01 Mar 2023 15:00) (15 - 28)  SpO2: 99% (01 Mar 2023 15:00) (93% - 99%)    Parameters below as of 01 Mar 2023 15:00  Patient On (Oxygen Delivery Method): room air        I&O's Summary    28 Feb 2023 07:01  -  01 Mar 2023 07:00  --------------------------------------------------------  IN: 1800 mL / OUT: 1550 mL / NET: 250 mL    01 Mar 2023 07:01  -  02 Mar 2023 01:20  --------------------------------------------------------  IN: 800 mL / OUT: 450 mL / NET: 350 mL        PHYSICAL EXAM:  GEN: laying in bed, NAD  NEURO: not alert. FC, does not OE, face symmetric. PERRL. RUE shows 2 fingers, RLE wiggles toes. LUE brisk w/d, LLE w/d.  CV: RRR +S1/S2  PULM: CTAB  GI: Abd soft, NT/ND  EXT: ext warm, dry, nontender  WOUND: headwrap c/d/i.    TUBES/LINES:  [] Canas  [] Lumbar Drain  [x] Wound Drains: SG JPx1  [] Others      DIET:  [x] NPO  [] Mechanical  [] Tube feeds    LABS:                        13.5   10.93 )-----------( 215      ( 01 Mar 2023 22:54 )             39.3     03-01    137  |  109<H>  |  12  ----------------------------<  152<H>  4.4   |  22  |  0.89    Ca    8.2<L>      01 Mar 2023 22:54  Phos  2.8     03-01  Mg     1.6     03-01    TPro  5.4<L>  /  Alb  3.1<L>  /  TBili  0.7  /  DBili  x   /  AST  12  /  ALT  11  /  AlkPhos  76  03-01    PT/INR - ( 01 Mar 2023 22:54 )   PT: 14.5 sec;   INR: 1.22          PTT - ( 01 Mar 2023 22:54 )  PTT:24.6 sec    CARDIAC MARKERS ( 01 Mar 2023 23:10 )  x     / 0.01 ng/mL / x     / x     / x          CAPILLARY BLOOD GLUCOSE      POCT Blood Glucose.: 129 mg/dL (01 Mar 2023 23:28)      Drug Levels: [] N/A    CSF Analysis: [] N/A      Allergies    No Known Allergies    Intolerances      MEDICATIONS:  Antibiotics:  ceFAZolin   IVPB 2000 milliGRAM(s) IV Intermittent every 8 hours    Neuro:  acetaminophen     Tablet .. 650 milliGRAM(s) Oral every 6 hours PRN  levETIRAcetam  IVPB 1000 milliGRAM(s) IV Intermittent every 12 hours  ondansetron Injectable 4 milliGRAM(s) IV Push every 6 hours PRN  propofol Infusion 10 MICROgram(s)/kG/Min IV Continuous <Continuous>    Anticoagulation:    OTHER:  chlorhexidine 0.12% Liquid 15 milliLiter(s) Oral Mucosa every 12 hours  chlorhexidine 2% Cloths 1 Application(s) Topical <User Schedule>  dexAMETHasone  Injectable 4 milliGRAM(s) IV Push every 6 hours  hydrALAZINE Injectable 10 milliGRAM(s) IV Push every 4 hours PRN  influenza   Vaccine 0.5 milliLiter(s) IntraMuscular once  niMODipine 60 milliGRAM(s) Oral every 4 hours  norepinephrine Infusion 0.05 MICROgram(s)/kG/Min IV Continuous <Continuous>  pantoprazole  Injectable 40 milliGRAM(s) IV Push daily  polyethylene glycol 3350 17 Gram(s) Oral daily  senna 2 Tablet(s) Oral at bedtime    IVF:  sodium chloride 0.9%. 1000 milliLiter(s) IV Continuous <Continuous>    CULTURES:  Culture Results:   No growth to date (02-28 @ 05:49)    RADIOLOGY & ADDITIONAL TESTS:      ASSESSMENT:  Patient is a 33yo M with PMHx of anxiety who originally presented to [facility] with severe headache, nausea with emesis x 1 and diaphoresis and was found to have a right 6mm ICA aneurysm on CTA, admitted, now s/p lumbar puncture 2/28 and s/p cerebral angiogram with findings of 7.8 mm R Pcomm aneurysm 2/28. now s/p R pteronial crani for R PCOMM clipping, intraoperative aneurysm rupture (3/1).    ANEURYSM    No pertinent family history in first degree relatives    Handoff    MEWS Score    Anxiety    Cerebral aneurysm    Cerebral aneurysm    Unruptured cerebral aneurysm    Anxiety    Pre-op evaluation    Leukocytosis    Lumbar puncture    Angiogram, carotid and cerebral, bilateral    Craniotomy, for aneurysm repair    No significant past surgical history    Room Service Assist    [email or web address]        PLAN:  NEURO  - Neuro/vitals q 1 hours  - Pain control PRN  - propofol gtt sedation  - post op CTH stable  - subgaleal ALONA x1, monitor output  - keppra 1g bid   - decadron 3 day taper   - pending CTA 3/2 am  - stroke core measures  - nimodipine 60q4    PULM  - intubated, full vent support     CARDIO  - SBP   - levo prn     GI  - NPO   - bowel regimen  - Last BM 2/27   - zofran prn nausea  - protonix while on steroids    ENDO  - no active issues    RENAL  - IVF @ 100/hr while NPO  - +canas   - euvolemia goal     HEME  - SCDs    ID  - Afebrile  - post op ancef    Dispo: ICU status, full code, dispo pending     Assessment and plan discussed with Dr. Wiseman and Dr. Mckeon      Assessment:  Present when checked    []  GCS  E   V  M     Heart Failure: []Acute, [] acute on chronic , []chronic  Heart Failure:  [] Diastolic (HFpEF), [] Systolic (HFrEF), []Combined (HFpEF and HFrEF), [] RHF, [] Pulm HTN, [] Other    [] EDISON, [] ATN, [] AIN, [] other  [] CKD1, [] CKD2, [] CKD 3, [] CKD 4, [] CKD 5, []ESRD    Encephalopathy: [] Metabolic, [] Hepatic, [] toxic, [] Neurological, [] Other    Abnormal Nurtitional Status: [] malnurtition (see nutrition note), [ ]underweight: BMI < 19, [] morbid obesity: BMI >40, [] Cachexia    [] Sepsis  [] hypovolemic shock,[] cardiogenic shock, [] hemorrhagic shock, [] neuogenic shock  [] Acute Respiratory Failure  []Cerebral edema, [] Brain compression/ herniation,   [] Functional quadriplegia  [] Acute blood loss anemia

## 2023-03-02 NOTE — PROGRESS NOTE ADULT - ASSESSMENT
Assessment: 32M found to have PCOMM s/p DSA w/o intervention pending open crani for clipping 3/1    NEURO:  cerebral aneurysm s/p DSA pending OR crani clipping 3/1 - hemosiderin deposits seen now presumed ruptured (sentinal headache 2/27); c/b intra-op re-rupture  -CTA head for clip-check  s/p LP w/ traumatic tap   -c/w keppra 1g BID   -dexamethasone taper x3 days  Sedation: propofol d/c post CTA wean to extubate RASS goal 0 to -1   DCI management- frequent TCDs   nimodipine w/ hold parameters total 21days    -neuro check q1  -pain management w/ Tylenol & oxycodone(when extubated and tolerating PO)  -PT/OT     PULMONARY:  saturating well on RA,   -continue to monitor on pulse o2   intubated wean to extubate - SBT     CARDIOVASCULAR:  monitor on telemetry   vitals q1  sbp goal 100-120; if CTA adequate; can liberlize to 100-180 SBP  philly placed 2/28  bradycardia episodes s/p atropine now sinus       GASTROENTEROLOGY:   ensure BMs w/ Miralax & senna  Daily stool count, LBM prior to arrival 2/17  NPO for extubation    RENAL/:  -check BMP qd  -strict i/o's ;   -Na goal 135-145  c/w IVF NS @100cc/hr given  & NPO for extubation     ENDOCRINE:  Monitor FSG q6 hrs while NPO  FSG goal 120-180    HEME/ONC:  DVT ppx: will hold chemical dvt ppx in setting of recent procedure   b/l SCDs    INFECTIOUS:   Monitor for fevers     Patient is critically ill, requiring critical care services.     I have personally and independently provided [ 35 ] minutes of critical care services.  This excludes any time spent on separate procedures or teaching.

## 2023-03-02 NOTE — PROGRESS NOTE ADULT - ASSESSMENT
32y/M with  ruptured R Pcomm aneurysm, SAH  headache  anxiety     PLAN:   NEURO: neurochecks q1h, PRN pain meds with acetaminophen, opiates, tramadol  cont nimodipine with hold parameters  s/p clipping, angio tomorrow   seizure prophylaxis:  levetiracetam 1000mg PO BID, as per neurosurgery   REHAB:  physical therapy evaluation and management    EARLY MOB:  HOB up, bed rest    PULM:  incentive spirometry, room air  CARDIO:  SBP goal 100-160mm Hg, f/u echo  ENDO:  Blood sugar goals 140-180 mg/dL  GI:  bowel regimen  DIET: NPO after midnight  RENAL:  keep NS@100cc/hr, strict Is and Os, keep euvolemic  HEM/ONC: Hb stable  VTE Prophylaxis: SCDs, no DVT chemoprophylaxis for now as patient is high risk for bleed (OR today)  ID: afebrile, no leukocytosis  Social: will update family    Active issues:  What's keeping patient in the ICU? VSP period  What is this patient's dispo plan? stepdown once VSP period over    ATTENDING ATTESTATION:  I was physically present for the key portions of the evaluation and management (E/M) service provided.  I agree with the above history, physical and plan, which I have reviewed and edited where appropriate.    Patient at high risk for neurological deterioration or death due to:  ICU delirium, aspiration PNA, DVT / PE.  Critical care time:  I have personally provided 45 minutes of critical care time, excluding time spent on separate procedures.      Plan discussed with RN, house staff.

## 2023-03-02 NOTE — PROGRESS NOTE ADULT - SUBJECTIVE AND OBJECTIVE BOX
=================================  NEUROCRITICAL CARE ATTENDING NOTE  =================================    JUANITO MASON   MRN-[id number]  Summary:  32y/M with PMHx of anxiety who originally presented to [facility] with severe headache, nausea with emesis x 1 and diaphoresis and was found to have a right 6mm ICA aneurysm on CTA. Patient reports that this morning he woke up with a severe headache that was accompanied by an episode of nausea and vomiting. He also endorses that the headache was so severe he became extremely anxious, felt as if he was having a panic attack and became diaphoretic. He states that headaches are controlled from the pain medicine he received while at [place] and did not have any additional episodes of nausea/vomiting. Pending further workup with repeat CTH/CTA and possible cerebral angiogram  Denies visual changes, dizziness, cigarette smoking, family hx of brain aneurysm, previous recurrent headaches, changes in sensation, mental status, unilateral weakness, confusion. (2023 23:14)    COURSE IN THE HOSPITAL:   admitted to [facility], s/p angio, unable to coil aneurysm   s/p clipping, 1L EBL given 2 units pRBC introperatively, remained intubated; intraop - aneurysm appears to be s/p rupture (consider  ictus, now BD#3); post-op bradycardia requiring atropine, junctional rhythm resolved   extubated CTA shows mild PComm vasospasm    Past Medical History: Anxiety  Allergies:  No Known Allergies  Home meds:     PHYSICAL EXAMINATION  T(C): 37.6 ( @ 21:00), Max: 37.7 ( @ 14:05) HR: 59 ( @ 21:00) (40 - 89) BP: 113/56 ( @ 21:00) (89/50 - 142/91) RR: 17 ( @ 21:00) (12 - 24) SpO2: 98% ( @ 21:00) (94% - 100%)  NEUROLOGIC EXAMINATION:  awake, alert, fully oriented, pupils 2-3mm equal and briskly reactive to light, EOMs intact, L UE/LE 4+/5, L UE drift R UE/LE 5/5  GENERAL: extubated  EENT:  anicteric  CARDIOVASCULAR: (+) S1 S2, bradycardic rate and regular rhythm  PULMONARY: clear to auscultation bilaterally  ABDOMEN: soft, nontender with normoactive bowel sounds  EXTREMITIES: no edema  SKIN: no rash    LABS:   CAPILLARY BLOOD GLUCOSE 129     (10.93)  12.5 (13.5)  19.60 )-----------( 220      ( 02 Mar 2023 05:22 )             36.7     135  |  106  |  12  ----------------------------<  134<H>  4.2   |  21<L>  |  0.82    Ca    7.7<L>      02 Mar 2023 05:22  Phos  3.8     03-  Mg     2.4     -    TPro  5.4<L>  /  Alb  3.1<L>  /  TBili  0.7  /  DBili  x   /  AST  12  /  ALT  11  /  AlkPhos  76   @ 07:  -   @ 07:00  IN: 2848.1 mL / OUT: 1900 mL / NET: 948.1 mL    Bacteriology:  CSF studies:  CSF STUDIES    G   P   L   *** SSX329,000 WBC22 *** %N   %L5     L2.0 *** BXA790,500 WBC22 *** %N   %L6     EEG:  Neuroimaging:  Other imaging:    MEDICATIONS:     ·	levETIRAcetam 1000 Oral two times a day  ·	oxyCODONE    IR 5 Oral every 6 hours  ·	niMODipine 60 milliGRAM(s) Oral every 4 hours  ·	pantoprazole    Tablet 40 Oral before breakfast  ·	polyethylene glycol 3350 17 Oral daily  ·	senna 2 Oral at bedtime  ·	dexAMETHasone     Tablet 2 Oral every 6 hours  ·	acetaminophen    Suspension .. 650 Oral every 6 hours PRN  ·	hydrALAZINE Injectable 10 IV Push every 4 hours PRN  ·	ondansetron Injectable 4 IV Push every 6 hours PRN    IV FLUIDS: NS@100  DRIPS:  DIET: NPO   Lines: A Line   Drains:    Wounds:    CODE STATUS:  Full Code                       GOALS OF CARE:  aggressive                      DISPOSITION:  ICU

## 2023-03-02 NOTE — PROGRESS NOTE ADULT - SUBJECTIVE AND OBJECTIVE BOX
INTERVAL HISTORY: HPI:  Patient is a 33yo M with PMHx of anxiety who originally presented to [facility] with severe headache, nausea with emesis x 1 and diaphoresis and was found to have a right 6mm ICA aneurysm on CTA. Patient reports that this morning he woke up with a severe headache that was accompanied by an episode of nausea and vomiting. He also endorses that the headache was so severe he became extremely anxious, felt as if he was having a panic attack and became diaphoretic. He states that headaches are controlled from the pain medicine he received while at [place] and did not have any additional episodes of nausea/vomiting. Pending further workup with repeat CTH/CTA and possible cerebral angiogram     Denies visual changes, dizziness, cigarette smoking, family hx of brain aneurysm, previous recurrent headaches, changes in sensation, mental status, unilateral weakness, confusion. (27 Feb 2023 23:14)    PAST MEDICAL & SURGICAL HISTORY:  Anxiety  No significant past surgical history    REVIEW OF SYSTEMS: [ ] Unable to Assess due to neurologic exam   [x] All ROS addressed below are non-contributory, except:  Neuro: [ ] Headache [ ] Back pain [ ] Numbness [ ] Weakness [ ] Ataxia [ ] Dizziness [ ] Aphasia [ ] Dysarthria [ ] Visual disturbance  Resp: [ ] Shortness of breath/dyspnea, [ ] Orthopnea [ ] Cough  CV: [ ] Chest pain [ ] Palpitation [ ] Lightheadedness [ ] Syncope  Renal: [ ] Thirst [ ] Edema  GI: [ ] Nausea [ ] Emesis [ ] Abdominal pain [ ] Constipation [ ] Diarrhea  Hem: [ ] Hematemesis [ ] bright red blood per rectum  ID: [ ] Fever [ ] Chills [ ] Dysuria  ENT: [ ] Rhinorrhea    PHYSICAL EXAM:    General: No Acute Distress, intubated     Neurological: eyes open to voice, follows commands, pupils 2mm reactive, b/l LE AG, LLE 2/5 HF, RLE 5/5     Pulmonary: Clear to Auscultation, No Rales, No Rhonchi, No Wheezes     Cardiovascular: S1, S2, Regular Rate and Rhythm     Gastrointestinal: Soft, Nontender, Nondistended     Extremities: No calf tenderness     Incision: R groin w/o hematoma            ICU Vital Signs Last 24 Hrs  T(C): 37.4 (02 Mar 2023 10:00), Max: 37.4 (01 Mar 2023 14:37)  T(F): 99.4 (02 Mar 2023 10:00), Max: 99.4 (02 Mar 2023 10:00)  HR: 70 (02 Mar 2023 09:00) (40 - 89)  BP: 123/65 (02 Mar 2023 09:00) (89/50 - 142/91)  BP(mean): 86 (02 Mar 2023 09:00) (63 - 100)  ABP: 121/52 (02 Mar 2023 09:00) (86/47 - 127/63)  ABP(mean): 72 (02 Mar 2023 09:00) (60 - 85)  RR: 18 (02 Mar 2023 09:00) (12 - 28)  SpO2: 99% (02 Mar 2023 09:00) (95% - 100%)      03-01-23 @ 07:01  -  03-02-23 @ 07:00  --------------------------------------------------------  IN: 2848.1 mL / OUT: 1900 mL / NET: 948.1 mL    03-02-23 @ 07:01  -  03-02-23 @ 10:20  --------------------------------------------------------  IN: 294.2 mL / OUT: 265 mL / NET: 29.2 mL        Mode: AC/ CMV (Assist Control/ Continuous Mandatory Ventilation), RR (machine): 12, TV (machine): 450, FiO2: 40, PEEP: 5, ITime: 1, MAP: 7, PIP: 13    acetaminophen    Suspension .. 650 milliGRAM(s) Oral every 6 hours PRN  ceFAZolin   IVPB 2000 milliGRAM(s) IV Intermittent every 8 hours  chlorhexidine 0.12% Liquid 15 milliLiter(s) Oral Mucosa every 12 hours  chlorhexidine 2% Cloths 1 Application(s) Topical <User Schedule>  dexAMETHasone  Injectable   IV Push   dexAMETHasone  Injectable 4 milliGRAM(s) IV Push every 6 hours  hydrALAZINE Injectable 10 milliGRAM(s) IV Push every 4 hours PRN  influenza   Vaccine 0.5 milliLiter(s) IntraMuscular once  levETIRAcetam  IVPB 1000 milliGRAM(s) IV Intermittent every 12 hours  ondansetron Injectable 4 milliGRAM(s) IV Push every 6 hours PRN  pantoprazole  Injectable 40 milliGRAM(s) IV Push daily  polyethylene glycol 3350 17 Gram(s) Oral daily  propofol Infusion 10 MICROgram(s)/kG/Min (5.7 mL/Hr) IV Continuous <Continuous>  senna 2 Tablet(s) Oral at bedtime  sodium chloride 0.9%. 1000 milliLiter(s) (100 mL/Hr) IV Continuous <Continuous>      LABS:  Na: 135 (03-02 @ 05:22), 137 (03-01 @ 22:54), 138 (03-01 @ 05:30), 139 (02-28 @ 14:36), 138 (02-28 @ 05:30), 138 (02-27 @ 23:46)  K: 4.2 (03-02 @ 05:22), 4.4 (03-01 @ 22:54), 4.2 (03-01 @ 05:30), 4.2 (02-28 @ 14:36), 4.0 (02-28 @ 05:30), 3.8 (02-27 @ 23:46)  Cl: 106 (03-02 @ 05:22), 109 (03-01 @ 22:54), 105 (03-01 @ 05:30), 107 (02-28 @ 14:36), 104 (02-28 @ 05:30), 104 (02-27 @ 23:46)  CO2: 21 (03-02 @ 05:22), 22 (03-01 @ 22:54), 25 (03-01 @ 05:30), 24 (02-28 @ 14:36), 24 (02-28 @ 05:30), 23 (02-27 @ 23:46)  BUN: 12 (03-02 @ 05:22), 12 (03-01 @ 22:54), 13 (03-01 @ 05:30), 13 (02-28 @ 14:36), 13 (02-28 @ 05:30), 13 (02-27 @ 23:46)  Cr: 0.82 (03-02 @ 05:22), 0.89 (03-01 @ 22:54), 0.83 (03-01 @ 05:30), 0.88 (02-28 @ 14:36), 0.85 (02-28 @ 05:30), 0.85 (02-27 @ 23:46)  Glu: 134(03-02 @ 05:22), 152(03-01 @ 22:54), 108(03-01 @ 05:30), 109(02-28 @ 14:36), 112(02-28 @ 05:30), 107(02-27 @ 23:46)    Hgb: 12.5 (03-02 @ 05:22), 13.5 (03-01 @ 22:54), 14.3 (03-01 @ 05:30), 13.8 (02-28 @ 14:36), 14.5 (02-28 @ 05:30), 14.6 (02-27 @ 23:46)  Hct: 36.7 (03-02 @ 05:22), 39.3 (03-01 @ 22:54), 42.4 (03-01 @ 05:30), 40.6 (02-28 @ 14:36), 42.5 (02-28 @ 05:30), 42.3 (02-27 @ 23:46)  WBC: 19.60 (03-02 @ 05:22), 10.93 (03-01 @ 22:54), 16.46 (03-01 @ 05:30), 8.51 (02-28 @ 14:36), 10.81 (02-28 @ 05:30), 12.21 (02-27 @ 23:46)  Plt: 220 (03-02 @ 05:22), 215 (03-01 @ 22:54), 241 (03-01 @ 05:30), 229 (02-28 @ 14:36), 234 (02-28 @ 05:30), 227 (02-27 @ 23:46)    INR: 1.22 03-01-23 @ 22:54, 1.19 03-01-23 @ 05:30, 1.18 02-28-23 @ 14:36, 1.07 02-27-23 @ 23:46  PTT: 24.6 03-01-23 @ 22:54, 26.6 03-01-23 @ 05:30, 26.8 02-28-23 @ 14:36, 31.6 02-27-23 @ 23:46          LIVER FUNCTIONS - ( 01 Mar 2023 22:54 )  Alb: 3.1 g/dL / Pro: 5.4 g/dL / ALK PHOS: 76 U/L / ALT: 11 U/L / AST: 12 U/L / GGT: x           ABG - ( 01 Mar 2023 22:58 )  pH, Arterial: 7.33  pH, Blood: x     /  pCO2: 41    /  pO2: 174   / HCO3: 22    / Base Excess: -4.1  /  SaO2: 99.5            RADIOLOGY & ADDITIONAL STUDIES:  < from: CT Angio Neck w/ IV Cont (02.28.23 @ 01:15) >  Intracranial CTA: 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    1 mm superiorly directed outpouching of the paraclinoid right ICA which   may represent origin of the ophthalmic artery versus small aneurysm..    Extracranial CTA: No steno-occlusive disease.    < end of copied text >

## 2023-03-02 NOTE — PROGRESS NOTE ADULT - SUBJECTIVE AND OBJECTIVE BOX
Surgery: femoral diagnostic cerebral angiogram   Consent: Signed by patient in chart                    NAME/NUMBER of HCP: Rosi Duran ([facility]) [phone], Suzanna Champagne ([facility]) [phone]      No Known Allergies      OVERNIGHT EVENTS:    T(C): 37.7 (03-02-23 @ 14:05), Max: 37.7 (03-02-23 @ 14:05)  HR: 61 (03-02-23 @ 15:00) (40 - 89)  BP: 113/60 (03-02-23 @ 15:00) (89/50 - 142/91)  RR: 19 (03-02-23 @ 15:00) (12 - 24)  SpO2: 99% (03-02-23 @ 15:00) (95% - 100%)  Wt(kg): --    EXAM:  General: NAD, pt is comfortably laying in bed, A&O x3, on RA  HEENT: CN II-XII grossly intact, PERRL 3mm, EOMI b/l, face symmetric, tongue midline, neck FROM  Cardiovascular: RRR, normal S1 and S2   Respiratory: lungs CTAB, no wheezing, rhonchi, or crackles   GI: normoactive BS to auscultation, abd soft, NTND   Neuro: no aphasia, speech clear, no dysmetria, +LUE pronator drift  RUE/RLE strength 5/5, LUE/LLE strength 4/5   sensation intact to light touch throughout   Extremities: distal pulses 2+ x4   Wound/incision: R pterional craniotomy incision with staples C/D/I   Drains: [facility] drain to self-suction         03-02    135  |  106  |  12  ----------------------------<  134<H>  4.2   |  21<L>  |  0.82    Ca    7.7<L>      02 Mar 2023 05:22  Phos  3.8     03-02  Mg     2.4     03-02    TPro  5.4<L>  /  Alb  3.1<L>  /  TBili  0.7  /  DBili  x   /  AST  12  /  ALT  11  /  AlkPhos  76  03-01    CBC Full  -  ( 02 Mar 2023 05:22 )  WBC Count : 19.60 K/uL  RBC Count : 3.98 M/uL  Hemoglobin : 12.5 g/dL  Hematocrit : 36.7 %  Platelet Count - Automated : 220 K/uL  Mean Cell Volume : 92.2 fl  Mean Cell Hemoglobin : 31.4 pg  Mean Cell Hemoglobin Concentration : 34.1 gm/dL  Auto Neutrophil # : x  Auto Lymphocyte # : x  Auto Monocyte # : x  Auto Eosinophil # : x  Auto Basophil # : x  Auto Neutrophil % : x  Auto Lymphocyte % : x  Auto Monocyte % : x  Auto Eosinophil % : x  Auto Basophil % : x    PT/INR - ( 01 Mar 2023 22:54 )   PT: 14.5 sec;   INR: 1.22          PTT - ( 01 Mar 2023 22:54 )  PTT:24.6 sec    Pregnancy test: N/A   Type & Screen (in past 72hrs): pend am labs     CXR: no acute pathology   EKG: NSR   ECHO:    Medical Clearances:  Other Clearances:     Last dose of antiplatelet/anticoagulation drug:    Implanted Devices (pacemaker, drug pump...etc):  []YES   [] NO                  If yes --> EPS consulted to interrogate/adjust device:    3M nasal swab ordered?  _Y  _N  Cranial surgery: Order written for hair to be shampooed night before surgery  [] yes   []no                 Assessment:    Plan:    Assessment:  Present when checked    []  GCS  E   V  M     Heart Failure: []Acute, [] acute on chronic , []chronic  Heart Failure:  [] Diastolic (HFpEF), [] Systolic (HFrEF), []Combined (HFpEF and HFrEF), [] RHF, [] Pulm HTN, [] Other    [] EDISON, [] ATN, [] AIN, [] other  [] CKD1, [] CKD2, [] CKD 3, [] CKD 4, [] CKD 5, []ESRD    Encephalopathy: [] Metabolic, [] Hepatic, [] toxic, [] Neurological, [] Other    Abnormal Nurtitional Status: [] malnurtition (see nutrition note), [ ]underweight: BMI < 19, [] morbid obesity: BMI >40, [] Cachexia    [] Sepsis  [] hypovolemic shock,[] cardiogenic shock, [] hemorrhagic shock, [] neuogenic shock  [] Acute Respiratory Failure  []Cerebral edema, [] Brain compression/ herniation,   [] Functional quadriplegia  [] Acute blood loss anemia Surgery: femoral diagnostic cerebral angiogram   Consent: Signed by patient in chart                    NAME/NUMBER of HCP: Rosi Duran ([facility]) [phone], Suzanna Champagne ([facility]) [phone]      No Known Allergies      OVERNIGHT EVENTS:    T(C): 37.7 (03-02-23 @ 14:05), Max: 37.7 (03-02-23 @ 14:05)  HR: 61 (03-02-23 @ 15:00) (40 - 89)  BP: 113/60 (03-02-23 @ 15:00) (89/50 - 142/91)  RR: 19 (03-02-23 @ 15:00) (12 - 24)  SpO2: 99% (03-02-23 @ 15:00) (95% - 100%)  Wt(kg): --    EXAM:  General: NAD, pt is comfortably laying in bed, A&O x3, on RA  HEENT: CN II-XII grossly intact, PERRL 3mm, EOMI b/l, face symmetric, tongue midline, neck FROM  Cardiovascular: RRR, normal S1 and S2   Respiratory: lungs CTAB, no wheezing, rhonchi, or crackles   GI: normoactive BS to auscultation, abd soft, NTND   Neuro: no aphasia, speech clear, no dysmetria, +LUE pronator drift  RUE/RLE strength 5/5, LUE/LLE strength 3-4/5   sensation intact to light touch throughout   Extremities: distal pulses 2+ x4   Wound/incision: R pterional craniotomy incision with staples C/D/I   Drains: [facility] drain to self-suction         03-02    135  |  106  |  12  ----------------------------<  134<H>  4.2   |  21<L>  |  0.82    Ca    7.7<L>      02 Mar 2023 05:22  Phos  3.8     03-02  Mg     2.4     03-02    TPro  5.4<L>  /  Alb  3.1<L>  /  TBili  0.7  /  DBili  x   /  AST  12  /  ALT  11  /  AlkPhos  76  03-01    CBC Full  -  ( 02 Mar 2023 05:22 )  WBC Count : 19.60 K/uL  RBC Count : 3.98 M/uL  Hemoglobin : 12.5 g/dL  Hematocrit : 36.7 %  Platelet Count - Automated : 220 K/uL  Mean Cell Volume : 92.2 fl  Mean Cell Hemoglobin : 31.4 pg  Mean Cell Hemoglobin Concentration : 34.1 gm/dL  Auto Neutrophil # : x  Auto Lymphocyte # : x  Auto Monocyte # : x  Auto Eosinophil # : x  Auto Basophil # : x  Auto Neutrophil % : x  Auto Lymphocyte % : x  Auto Monocyte % : x  Auto Eosinophil % : x  Auto Basophil % : x    PT/INR - ( 01 Mar 2023 22:54 )   PT: 14.5 sec;   INR: 1.22          PTT - ( 01 Mar 2023 22:54 )  PTT:24.6 sec    Pregnancy test: N/A   Type & Screen (in past 72hrs): pend am labs     CXR: no acute pathology   EKG: NSR   ECHO: pend from admission    Medical Clearances: ICU patient   Other Clearances:     Last dose of antiplatelet/anticoagulation drug: N/A     Implanted Devices (pacemaker, drug pump...etc):  []YES   [X] NO                  If yes --> EPS consulted to interrogate/adjust device:                 Assessment:  Patient is a 33yo M with PMHx of anxiety who originally presented to [facility] with severe headache, nausea with emesis x 1 and diaphoresis and was found to have a right 6mm ICA aneurysm on CTA, admitted, now s/p lumbar puncture 2/28 and s/p cerebral angiogram with findings of 7.8 mm R Pcomm aneurysm 2/28. now s/p R pteronial crani for R PCOMM clipping w/ ICG angio, intraoperative aneurysm rupture (3/1).      Plan:  - NPO after midnight   - IVF while NPO   - consent signed and in chart for angio tomorrow   - type and screen and coags pend am lab draw       Assessment and plan discussed with Dr. Wiseman and Dr. Marina          Assessment:  Present when checked    []  GCS  E   V  M     Heart Failure: []Acute, [] acute on chronic , []chronic  Heart Failure:  [] Diastolic (HFpEF), [] Systolic (HFrEF), []Combined (HFpEF and HFrEF), [] RHF, [] Pulm HTN, [] Other    [] EDISON, [] ATN, [] AIN, [] other  [] CKD1, [] CKD2, [] CKD 3, [] CKD 4, [] CKD 5, []ESRD    Encephalopathy: [] Metabolic, [] Hepatic, [] toxic, [] Neurological, [] Other    Abnormal Nurtitional Status: [] malnurtition (see nutrition note), [ ]underweight: BMI < 19, [] morbid obesity: BMI >40, [] Cachexia    [] Sepsis  [] hypovolemic shock,[] cardiogenic shock, [] hemorrhagic shock, [] neuogenic shock  [] Acute Respiratory Failure  []Cerebral edema, [] Brain compression/ herniation,   [] Functional quadriplegia  [] Acute blood loss anemia

## 2023-03-03 ENCOUNTER — TRANSCRIPTION ENCOUNTER (OUTPATIENT)
Age: 33
End: 2023-03-03

## 2023-03-03 ENCOUNTER — APPOINTMENT (OUTPATIENT)
Dept: NEUROSURGERY | Facility: HOSPITAL | Age: 33
End: 2023-03-03

## 2023-03-03 PROBLEM — Z00.00 ENCOUNTER FOR PREVENTIVE HEALTH EXAMINATION: Noted: 2023-03-03

## 2023-03-03 LAB
ANION GAP SERPL CALC-SCNC: 7 MMOL/L — SIGNIFICANT CHANGE UP (ref 5–17)
ANION GAP SERPL CALC-SCNC: 9 MMOL/L — SIGNIFICANT CHANGE UP (ref 5–17)
APTT BLD: 16.6 SEC — LOW (ref 27.5–35.5)
APTT BLD: 18.2 SEC — LOW (ref 27.5–35.5)
BLD GP AB SCN SERPL QL: NEGATIVE — SIGNIFICANT CHANGE UP
BUN SERPL-MCNC: 11 MG/DL — SIGNIFICANT CHANGE UP (ref 7–23)
BUN SERPL-MCNC: 11 MG/DL — SIGNIFICANT CHANGE UP (ref 7–23)
CALCIUM SERPL-MCNC: 8.1 MG/DL — LOW (ref 8.4–10.5)
CALCIUM SERPL-MCNC: 8.1 MG/DL — LOW (ref 8.4–10.5)
CHLORIDE SERPL-SCNC: 104 MMOL/L — SIGNIFICANT CHANGE UP (ref 96–108)
CHLORIDE SERPL-SCNC: 106 MMOL/L — SIGNIFICANT CHANGE UP (ref 96–108)
CO2 SERPL-SCNC: 24 MMOL/L — SIGNIFICANT CHANGE UP (ref 22–31)
CO2 SERPL-SCNC: 24 MMOL/L — SIGNIFICANT CHANGE UP (ref 22–31)
CREAT SERPL-MCNC: 0.7 MG/DL — SIGNIFICANT CHANGE UP (ref 0.5–1.3)
CREAT SERPL-MCNC: 0.73 MG/DL — SIGNIFICANT CHANGE UP (ref 0.5–1.3)
EGFR: 124 ML/MIN/1.73M2 — SIGNIFICANT CHANGE UP
EGFR: 126 ML/MIN/1.73M2 — SIGNIFICANT CHANGE UP
GLUCOSE SERPL-MCNC: 108 MG/DL — HIGH (ref 70–99)
GLUCOSE SERPL-MCNC: 122 MG/DL — HIGH (ref 70–99)
HCT VFR BLD CALC: 31.8 % — LOW (ref 39–50)
HCT VFR BLD CALC: 33.6 % — LOW (ref 39–50)
HGB BLD-MCNC: 10.7 G/DL — LOW (ref 13–17)
HGB BLD-MCNC: 11.4 G/DL — LOW (ref 13–17)
INR BLD: 1.18 — HIGH (ref 0.88–1.16)
INR BLD: 1.27 — HIGH (ref 0.88–1.16)
MAGNESIUM SERPL-MCNC: 1.7 MG/DL — SIGNIFICANT CHANGE UP (ref 1.6–2.6)
MAGNESIUM SERPL-MCNC: 1.9 MG/DL — SIGNIFICANT CHANGE UP (ref 1.6–2.6)
MCHC RBC-ENTMCNC: 31.2 PG — SIGNIFICANT CHANGE UP (ref 27–34)
MCHC RBC-ENTMCNC: 31.7 PG — SIGNIFICANT CHANGE UP (ref 27–34)
MCHC RBC-ENTMCNC: 33.6 GM/DL — SIGNIFICANT CHANGE UP (ref 32–36)
MCHC RBC-ENTMCNC: 33.9 GM/DL — SIGNIFICANT CHANGE UP (ref 32–36)
MCV RBC AUTO: 92.7 FL — SIGNIFICANT CHANGE UP (ref 80–100)
MCV RBC AUTO: 93.3 FL — SIGNIFICANT CHANGE UP (ref 80–100)
NRBC # BLD: 0 /100 WBCS — SIGNIFICANT CHANGE UP (ref 0–0)
NRBC # BLD: 0 /100 WBCS — SIGNIFICANT CHANGE UP (ref 0–0)
PHOSPHATE SERPL-MCNC: 2.6 MG/DL — SIGNIFICANT CHANGE UP (ref 2.5–4.5)
PHOSPHATE SERPL-MCNC: 3.6 MG/DL — SIGNIFICANT CHANGE UP (ref 2.5–4.5)
PLATELET # BLD AUTO: 152 K/UL — SIGNIFICANT CHANGE UP (ref 150–400)
PLATELET # BLD AUTO: 186 K/UL — SIGNIFICANT CHANGE UP (ref 150–400)
POTASSIUM SERPL-MCNC: 4.2 MMOL/L — SIGNIFICANT CHANGE UP (ref 3.5–5.3)
POTASSIUM SERPL-MCNC: 4.2 MMOL/L — SIGNIFICANT CHANGE UP (ref 3.5–5.3)
POTASSIUM SERPL-SCNC: 4.2 MMOL/L — SIGNIFICANT CHANGE UP (ref 3.5–5.3)
POTASSIUM SERPL-SCNC: 4.2 MMOL/L — SIGNIFICANT CHANGE UP (ref 3.5–5.3)
PROTHROM AB SERPL-ACNC: 14.1 SEC — HIGH (ref 10.5–13.4)
PROTHROM AB SERPL-ACNC: 15.2 SEC — HIGH (ref 10.5–13.4)
RBC # BLD: 3.43 M/UL — LOW (ref 4.2–5.8)
RBC # BLD: 3.6 M/UL — LOW (ref 4.2–5.8)
RBC # FLD: 14.4 % — SIGNIFICANT CHANGE UP (ref 10.3–14.5)
RBC # FLD: 14.6 % — HIGH (ref 10.3–14.5)
RH IG SCN BLD-IMP: POSITIVE — SIGNIFICANT CHANGE UP
SARS-COV-2 RNA SPEC QL NAA+PROBE: SIGNIFICANT CHANGE UP
SODIUM SERPL-SCNC: 135 MMOL/L — SIGNIFICANT CHANGE UP (ref 135–145)
SODIUM SERPL-SCNC: 139 MMOL/L — SIGNIFICANT CHANGE UP (ref 135–145)
WBC # BLD: 15.7 K/UL — HIGH (ref 3.8–10.5)
WBC # BLD: 17.65 K/UL — HIGH (ref 3.8–10.5)
WBC # FLD AUTO: 15.7 K/UL — HIGH (ref 3.8–10.5)
WBC # FLD AUTO: 17.65 K/UL — HIGH (ref 3.8–10.5)

## 2023-03-03 PROCEDURE — 99291 CRITICAL CARE FIRST HOUR: CPT

## 2023-03-03 PROCEDURE — 36224 PLACE CATH CAROTD ART: CPT | Mod: 58,RT

## 2023-03-03 RX ORDER — NIMODIPINE 60 MG/10ML
60 SOLUTION ORAL EVERY 4 HOURS
Refills: 0 | Status: DISCONTINUED | OUTPATIENT
Start: 2023-03-03 | End: 2023-03-04

## 2023-03-03 RX ORDER — HYDRALAZINE HCL 50 MG
10 TABLET ORAL EVERY 4 HOURS
Refills: 0 | Status: DISCONTINUED | OUTPATIENT
Start: 2023-03-03 | End: 2023-03-05

## 2023-03-03 RX ORDER — MAGNESIUM SULFATE 500 MG/ML
2 VIAL (ML) INJECTION ONCE
Refills: 0 | Status: COMPLETED | OUTPATIENT
Start: 2023-03-03 | End: 2023-03-03

## 2023-03-03 RX ORDER — SODIUM CHLORIDE 9 MG/ML
500 INJECTION INTRAMUSCULAR; INTRAVENOUS; SUBCUTANEOUS ONCE
Refills: 0 | Status: COMPLETED | OUTPATIENT
Start: 2023-03-03 | End: 2023-03-03

## 2023-03-03 RX ADMIN — Medication 25 GRAM(S): at 06:37

## 2023-03-03 RX ADMIN — TRAMADOL HYDROCHLORIDE 100 MILLIGRAM(S): 50 TABLET ORAL at 12:06

## 2023-03-03 RX ADMIN — OXYCODONE HYDROCHLORIDE 5 MILLIGRAM(S): 5 TABLET ORAL at 00:00

## 2023-03-03 RX ADMIN — OXYCODONE HYDROCHLORIDE 5 MILLIGRAM(S): 5 TABLET ORAL at 06:00

## 2023-03-03 RX ADMIN — Medication 62.5 MILLIMOLE(S): at 08:54

## 2023-03-03 RX ADMIN — POLYETHYLENE GLYCOL 3350 17 GRAM(S): 17 POWDER, FOR SOLUTION ORAL at 17:38

## 2023-03-03 RX ADMIN — SODIUM CHLORIDE 100 MILLILITER(S): 9 INJECTION INTRAMUSCULAR; INTRAVENOUS; SUBCUTANEOUS at 08:53

## 2023-03-03 RX ADMIN — LEVETIRACETAM 1000 MILLIGRAM(S): 250 TABLET, FILM COATED ORAL at 06:00

## 2023-03-03 RX ADMIN — SODIUM CHLORIDE 500 MILLILITER(S): 9 INJECTION INTRAMUSCULAR; INTRAVENOUS; SUBCUTANEOUS at 18:04

## 2023-03-03 RX ADMIN — Medication 2 MILLIGRAM(S): at 06:00

## 2023-03-03 RX ADMIN — CHLORHEXIDINE GLUCONATE 1 APPLICATION(S): 213 SOLUTION TOPICAL at 06:01

## 2023-03-03 RX ADMIN — TRAMADOL HYDROCHLORIDE 100 MILLIGRAM(S): 50 TABLET ORAL at 13:19

## 2023-03-03 RX ADMIN — SENNA PLUS 2 TABLET(S): 8.6 TABLET ORAL at 21:52

## 2023-03-03 RX ADMIN — Medication 2 MILLIGRAM(S): at 11:09

## 2023-03-03 RX ADMIN — OXYCODONE HYDROCHLORIDE 5 MILLIGRAM(S): 5 TABLET ORAL at 07:00

## 2023-03-03 RX ADMIN — LEVETIRACETAM 1000 MILLIGRAM(S): 250 TABLET, FILM COATED ORAL at 17:38

## 2023-03-03 RX ADMIN — Medication 1 MILLIGRAM(S): at 17:41

## 2023-03-03 RX ADMIN — SODIUM CHLORIDE 1000 MILLILITER(S): 9 INJECTION INTRAMUSCULAR; INTRAVENOUS; SUBCUTANEOUS at 10:22

## 2023-03-03 RX ADMIN — PANTOPRAZOLE SODIUM 40 MILLIGRAM(S): 20 TABLET, DELAYED RELEASE ORAL at 06:08

## 2023-03-03 RX ADMIN — SODIUM CHLORIDE 100 MILLILITER(S): 9 INJECTION INTRAMUSCULAR; INTRAVENOUS; SUBCUTANEOUS at 10:21

## 2023-03-03 RX ADMIN — Medication 5 MILLIGRAM(S): at 17:39

## 2023-03-03 NOTE — BRIEF OPERATIVE NOTE - NSICDXBRIEFPREOP_GEN_ALL_CORE_FT
PRE-OP DIAGNOSIS:  Cerebral aneurysm 28-Feb-2023 13:57:11  Tod Moser  

## 2023-03-03 NOTE — PROGRESS NOTE ADULT - ASSESSMENT
32y/M with  ruptured R Pcomm aneurysm, SAH  headache  anxiety     PLAN:   NEURO: neurochecks q1h, PRN pain meds with acetaminophen, opiates, tramadol  cont nimodipine with hold parameters  s/p clipping, angio tomorrow   seizure prophylaxis:  levetiracetam 1000mg PO BID, as per neurosurgery   REHAB:  physical therapy evaluation and management    EARLY MOB:  HOB up, bed rest    PULM:  incentive spirometry, room air  CARDIO:  SBP goal 100-160mm Hg, f/u echo  ENDO:  Blood sugar goals 140-180 mg/dL  GI:  bowel regimen  DIET: NPO after midnight  RENAL:  keep NS@100cc/hr, strict Is and Os, keep euvolemic  HEM/ONC: Hb stable  VTE Prophylaxis: SCDs, no DVT chemoprophylaxis for now as patient is high risk for bleed (OR today)  ID: afebrile, no leukocytosis  Social: will update family    Active issues:  What's keeping patient in the ICU? VSP period  What is this patient's dispo plan? stepdown once VSP period over    ATTENDING ATTESTATION:  I was physically present for the key portions of the evaluation and management (E/M) service provided.  I agree with the above history, physical and plan, which I have reviewed and edited where appropriate.    Patient at high risk for neurological deterioration or death due to:  ICU delirium, aspiration PNA, DVT / PE.  Critical care time:  I have personally provided 45 minutes of critical care time, excluding time spent on separate procedures.      Plan discussed with RN, house staff. 32y/M with  ruptured R Pcomm aneurysm, SAH  headache  anxiety     PLAN:   NEURO: neurochecks q1h, PRN pain meds with acetaminophen, opiates, tramadol  cont nimodipine with hold parameters  s/p angio - no VSP, repeat CTA on 03/08 (day 10)  seizure prophylaxis:  levetiracetam 1000mg PO BID, as per neurosurgery   REHAB:  physical therapy evaluation and management    EARLY MOB:  HOB up, bed rest    PULM:  incentive spirometry, room air  CARDIO:  SBP goal 100-180mm Hg, f/u echo  ENDO:  Blood sugar goals 140-180 mg/dL  GI:  bowel regimen  DIET: regular diet  RENAL:  d/c IVF, keep euvolemic  HEM/ONC: Hb stable  VTE Prophylaxis: SCDs, start SQL tonight  ID: afebrile, leukocytosis downtrending likely steroid-induced  Social: family at bedside    Active issues:  What's keeping patient in the ICU? VSP watch  What is this patient's dispo plan? stepdown on D10 if CTA NEG    ATTENDING ATTESTATION:  I was physically present for the key portions of the evaluation and management (E/M) service provided.  I agree with the above history, physical and plan, which I have reviewed and edited where appropriate.    Patient at high risk for neurological deterioration or death due to:  ICU delirium, aspiration PNA, DVT / PE.  Critical care time:  I have personally provided 45 minutes of critical care time, excluding time spent on separate procedures.      Plan discussed with RN, house staff.

## 2023-03-03 NOTE — CHART NOTE - NSCHARTNOTEFT_GEN_A_CORE
500cc bolus NS given in AM for euvolemia. POD0 angiogram showing complete clip ligation of R pcomm aneurym, no residual.

## 2023-03-03 NOTE — PROGRESS NOTE ADULT - SUBJECTIVE AND OBJECTIVE BOX
INTERVAL HISTORY: HPI:  Patient is a 33yo M with PMHx of anxiety who originally presented to [facility] with severe headache, nausea with emesis x 1 and diaphoresis and was found to have a right 6mm ICA aneurysm on CTA. Patient reports that this morning he woke up with a severe headache that was accompanied by an episode of nausea and vomiting. He also endorses that the headache was so severe he became extremely anxious, felt as if he was having a panic attack and became diaphoretic. He states that headaches are controlled from the pain medicine he received while at [place] and did not have any additional episodes of nausea/vomiting. Pending further workup with repeat CTH/CTA and possible cerebral angiogram     Denies visual changes, dizziness, cigarette smoking, family hx of brain aneurysm, previous recurrent headaches, changes in sensation, mental status, unilateral weakness, confusion. (27 Feb 2023 23:14)    PAST MEDICAL & SURGICAL HISTORY:  Anxiety  No significant past surgical history    REVIEW OF SYSTEMS: [ ] Unable to Assess due to neurologic exam   [x] All ROS addressed below are non-contributory, except:  Neuro: [ ] Headache [ ] Back pain [ ] Numbness [ ] Weakness [ ] Ataxia [ ] Dizziness [ ] Aphasia [ ] Dysarthria [ ] Visual disturbance  Resp: [ ] Shortness of breath/dyspnea, [ ] Orthopnea [ ] Cough  CV: [ ] Chest pain [ ] Palpitation [ ] Lightheadedness [ ] Syncope  Renal: [ ] Thirst [ ] Edema  GI: [ ] Nausea [ ] Emesis [ ] Abdominal pain [ ] Constipation [ ] Diarrhea  Hem: [ ] Hematemesis [ ] bright red blood per rectum  ID: [ ] Fever [ ] Chills [ ] Dysuria  ENT: [ ] Rhinorrhea    PHYSICAL EXAM:    General: No Acute Distress, intubated   Neurological: eyes open to voice, follows commands, pupils 2mm reactive, b/l UE 5/5 b/l LE 5/5   Pulmonary: Clear to Auscultation, No Rales, No Rhonchi, No Wheezes   Cardiovascular: S1, S2, Regular Rate and Rhythm   Gastrointestinal: Soft, Nontender, Nondistended   Extremities: No calf tenderness   Incision: R groin w/o hematoma    ICU Vital Signs Last 24 Hrs  T(C): 37.2 (03 Mar 2023 08:15), Max: 37.7 (02 Mar 2023 14:05)  T(F): 98.9 (03 Mar 2023 04:38), Max: 99.9 (02 Mar 2023 14:05)  HR: 66 (03 Mar 2023 09:00) (56 - 75)  BP: 109/57 (03 Mar 2023 09:00) (94/50 - 125/58)  BP(mean): 80 (03 Mar 2023 09:00) (59 - 83)  ABP: 108/57 (03 Mar 2023 09:00) (87/67 - 138/71)  ABP(mean): 73 (03 Mar 2023 09:00) (71 - 98)  RR: 13 (03 Mar 2023 09:00) (13 - 31)  SpO2: 95% (03 Mar 2023 09:00) (94% - 100%)      03-02-23 @ 07:01  -  03-03-23 @ 07:00  --------------------------------------------------------  IN: 2800 mL / OUT: 3325 mL / NET: -525 mL    03-03-23 @ 07:01  -  03-03-23 @ 10:03  --------------------------------------------------------  IN: 325 mL / OUT: 0 mL / NET: 325 mL        Mode: standby    acetaminophen    Suspension .. 650 milliGRAM(s) Oral every 6 hours PRN  bisacodyl 5 milliGRAM(s) Oral every 12 hours  chlorhexidine 2% Cloths 1 Application(s) Topical <User Schedule>  dexAMETHasone     Tablet   Oral   dexAMETHasone     Tablet 2 milliGRAM(s) Oral every 6 hours  dexAMETHasone     Tablet 1 milliGRAM(s) Oral every 6 hours  hydrALAZINE Injectable 10 milliGRAM(s) IV Push every 4 hours PRN  influenza   Vaccine 0.5 milliLiter(s) IntraMuscular once  levETIRAcetam 1000 milliGRAM(s) Oral two times a day  niMODipine 60 milliGRAM(s) Oral every 4 hours  ondansetron Injectable 4 milliGRAM(s) IV Push every 6 hours PRN  pantoprazole    Tablet 40 milliGRAM(s) Oral before breakfast  polyethylene glycol 3350 17 Gram(s) Oral daily  senna 2 Tablet(s) Oral at bedtime  sodium chloride 0.9%. 1000 milliLiter(s) (100 mL/Hr) IV Continuous <Continuous>  traMADol 100 milliGRAM(s) Oral every 6 hours PRN      LABS:  Na: 135 (03-03 @ 05:46), 135 (03-02 @ 05:22), 137 (03-01 @ 22:54), 138 (03-01 @ 05:30), 139 (02-28 @ 14:36)  K: 4.2 (03-03 @ 05:46), 4.2 (03-02 @ 05:22), 4.4 (03-01 @ 22:54), 4.2 (03-01 @ 05:30), 4.2 (02-28 @ 14:36)  Cl: 104 (03-03 @ 05:46), 106 (03-02 @ 05:22), 109 (03-01 @ 22:54), 105 (03-01 @ 05:30), 107 (02-28 @ 14:36)  CO2: 24 (03-03 @ 05:46), 21 (03-02 @ 05:22), 22 (03-01 @ 22:54), 25 (03-01 @ 05:30), 24 (02-28 @ 14:36)  BUN: 11 (03-03 @ 05:46), 12 (03-02 @ 05:22), 12 (03-01 @ 22:54), 13 (03-01 @ 05:30), 13 (02-28 @ 14:36)  Cr: 0.73 (03-03 @ 05:46), 0.82 (03-02 @ 05:22), 0.89 (03-01 @ 22:54), 0.83 (03-01 @ 05:30), 0.88 (02-28 @ 14:36)  Glu: 122(03-03 @ 05:46), 134(03-02 @ 05:22), 152(03-01 @ 22:54), 108(03-01 @ 05:30), 109(02-28 @ 14:36)    Hgb: 11.4 (03-03 @ 05:46), 12.5 (03-02 @ 05:22), 13.5 (03-01 @ 22:54), 14.3 (03-01 @ 05:30), 13.8 (02-28 @ 14:36)  Hct: 33.6 (03-03 @ 05:46), 36.7 (03-02 @ 05:22), 39.3 (03-01 @ 22:54), 42.4 (03-01 @ 05:30), 40.6 (02-28 @ 14:36)  WBC: 17.65 (03-03 @ 05:46), 19.60 (03-02 @ 05:22), 10.93 (03-01 @ 22:54), 16.46 (03-01 @ 05:30), 8.51 (02-28 @ 14:36)  Plt: 152 (03-03 @ 05:46), 220 (03-02 @ 05:22), 215 (03-01 @ 22:54), 241 (03-01 @ 05:30), 229 (02-28 @ 14:36)    INR: 1.27 03-03-23 @ 05:46, 1.22 03-01-23 @ 22:54, 1.19 03-01-23 @ 05:30, 1.18 02-28-23 @ 14:36  PTT: 18.2 03-03-23 @ 05:46, 24.6 03-01-23 @ 22:54, 26.6 03-01-23 @ 05:30, 26.8 02-28-23 @ 14:36    LIVER FUNCTIONS - ( 01 Mar 2023 22:54 )  Alb: 3.1 g/dL / Pro: 5.4 g/dL / ALK PHOS: 76 U/L / ALT: 11 U/L / AST: 12 U/L / GGT: x           ABG - ( 01 Mar 2023 22:58 )  pH, Arterial: 7.33  pH, Blood: x     /  pCO2: 41    /  pO2: 174   / HCO3: 22    / Base Excess: -4.1  /  SaO2: 99.5        RADIOLOGY & ADDITIONAL STUDIES:  < from: CT Angio Neck w/ IV Cont (02.28.23 @ 01:15) >  Intracranial CTA: 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    1 mm superiorly directed outpouching of the paraclinoid right ICA which   may represent origin of the ophthalmic artery versus small aneurysm..    Extracranial CTA: No steno-occlusive disease.    < end of copied text >

## 2023-03-03 NOTE — BRIEF OPERATIVE NOTE - NSICDXBRIEFPROCEDURE_GEN_ALL_CORE_FT
PROCEDURES:  Angiogram, carotid and cerebral, bilateral 28-Feb-2023 13:56:55  Tod Moser  
PROCEDURES:  Angiogram, carotid and cerebral, bilateral 28-Feb-2023 13:56:55  Tod Moser  
PROCEDURES:  Craniotomy, for aneurysm repair 01-Mar-2023 22:35:04  Ian Sutton

## 2023-03-03 NOTE — PROGRESS NOTE ADULT - ASSESSMENT
Assessment: 32M found to have ruptured PCOMM s/p DSA w/o intervention now s/p crani & clipping c/b intraop re-rupture   NEURO:  cerebral aneurysm s/p DSA pending OR crani clipping 3/1 - hemosiderin deposits seen now presumed ruptured (sentinal headache 2/27); c/b intra-op re-rupture  -DSA today for clip check   CTA w/ cerebral vasospasm  s/p LP w/ traumatic tap   -c/w keppra 1g BID   -dexamethasone taper x3 days  DCI management- frequent TCDs , DSA today   nimodipine w/ hold parameters total 21days    -neuro check q1  -pain management w/ Tylenol & oxycodone(when extubated and tolerating PO)  -PT/OT     PULMONARY:  saturating well on RA,   -continue to monitor on pulse o2   intubated wean to extubate - SBT     CARDIOVASCULAR:  monitor on telemetry   vitals q1  sbp goal 100-160;  philly placed 2/28  bradycardia episodes s/p atropine now sinus       GASTROENTEROLOGY:   ensure BMs w/ Miralax & senna  Daily stool count, LBM prior to arrival 2/17  NPO for DSA     RENAL/:  -check BMP qd  -strict i/o's ;   -Na goal 135-145  c/w IVF NS @100cc/hr given    ENDOCRINE:  Monitor FSG q6 hrs while NPO  FSG goal 120-180    HEME/ONC:  DVT ppx: will hold chemical dvt ppx in setting of recent procedure   b/l SCDs    INFECTIOUS:   Monitor for fevers     Patient is critically ill, requiring critical care services.     I have personally and independently provided [ 35 ] minutes of critical care services.  This excludes any time spent on separate procedures or teaching.

## 2023-03-03 NOTE — PRE-ANESTHESIA EVALUATION ADULT - MALLAMPATI CLASS
Class I (easy) - visualization of the soft palate, fauces, uvula, and both anterior and posterior pillars
TMJ pain due to operation/Class II - visualization of the soft palate, fauces, and uvula
Class I (easy) - visualization of the soft palate, fauces, uvula, and both anterior and posterior pillars

## 2023-03-03 NOTE — PROGRESS NOTE ADULT - SUBJECTIVE AND OBJECTIVE BOX
NEUROSURGERY POST OP NOTE:    POD# 0 S/P angiogram showing complete clip ligation of R Pcomm aneurysm, no residual, R Pcomm patent    S: Pt seen and examined at bedside. Denies N/V, double vision, blurry vision, weakness, numbness/tingling, pain or swelling in groin site      T(C): 37.2 (03-03-23 @ 13:45), Max: 37.7 (03-02-23 @ 17:20)  HR: 59 (03-03-23 @ 15:55) (56 - 70)  BP: 95/54 (03-03-23 @ 15:55) (94/50 - 125/58)  RR: 17 (03-03-23 @ 15:55) (12 - 31)  SpO2: 99% (03-03-23 @ 15:55) (94% - 100%)      03-02-23 @ 07:01  -  03-03-23 @ 07:00  --------------------------------------------------------  IN: 2800 mL / OUT: 3325 mL / NET: -525 mL    03-03-23 @ 07:01  -  03-03-23 @ 16:09  --------------------------------------------------------  IN: 1350 mL / OUT: 1120 mL / NET: 230 mL        acetaminophen    Suspension .. 650 milliGRAM(s) Oral every 6 hours PRN  bisacodyl 5 milliGRAM(s) Oral every 12 hours  chlorhexidine 2% Cloths 1 Application(s) Topical <User Schedule>  dexAMETHasone     Tablet   Oral   dexAMETHasone     Tablet 1 milliGRAM(s) Oral every 6 hours  hydrALAZINE Injectable 10 milliGRAM(s) IV Push every 4 hours PRN  influenza   Vaccine 0.5 milliLiter(s) IntraMuscular once  levETIRAcetam 1000 milliGRAM(s) Oral two times a day  niMODipine 60 milliGRAM(s) Oral every 4 hours  ondansetron Injectable 4 milliGRAM(s) IV Push every 6 hours PRN  pantoprazole    Tablet 40 milliGRAM(s) Oral before breakfast  polyethylene glycol 3350 17 Gram(s) Oral daily  senna 2 Tablet(s) Oral at bedtime  sodium chloride 0.9%. 1000 milliLiter(s) IV Continuous <Continuous>  traMADol 100 milliGRAM(s) Oral every 6 hours PRN      RADIOLOGY:     Exam:  Constitutional: 31 y/o male awake, alert in no acute distress.  Eyes:  Sclera anicteric, conjunctiva noninjected.  ENMT: Oropharyngeal mucosa moist, pink. Tongue midline.    Neck: Neck supple, FROM.  No appreciable lymphadenopathy.  Back:  No pain to palpation/percussion of low back. No CVA tenderness.  Respiratory: Clear to auscultation bilaterally.  No rales, rhonchi, wheezes.  Cardiovascular: Regular rate and rhythm.  S1, S2 heard.  Gastrointestinal:  Soft, nontender, nondistended.  +BS.  Genitourinary:  Deferred.  Rectal: Deferred.  Vascular: Extremities warm, no ulcers, no discoloration of skin. 2+ DP and PT pulses b/l  Neurological: Gen: AA&O x 3, conversant, appropriate.      CN II-XII grossly intact.    Motor: ARMSTRONG x 4, 5/5 throughout UE/LE.    Sens: Sensation intact to light touch throughout.    DTRs: 2+ symmetric throughout.    Hoffmans negative bilaterally.  Plantar downgoing bilaterally.  No clonus.      No pronator drift, no dysmetria.  Skin: Warm, dry, no erythema.  Wound: R crani incision with staples, SGJP and headwrap in place. R groin soft, nontender, with gauze and tegaderm in place c/d/i      Assessment:  Patient is a 33yo M with PMHx of anxiety who originally presented to [facility] with severe headache, nausea with emesis x 1 and diaphoresis and was found to have a right 6mm ICA aneurysm on CTA, admitted, now s/p lumbar puncture 2/28 and s/p cerebral angiogram with findings of 7.8 mm R Pcomm aneurysm 2/28. now s/p R pteronial crani for R PCOMM clipping w/ ICG angio, intraoperative aneurysm rupture (3/1). s/p angio showing complete ligation and no residual aneurysm (3/3).    Plan:    NEURO  - Neuro/vitals q 1 hours   - Pain control - Oxycodone 5 mg x 24 hours then prn, tramadol  - Post op CTH stable   - Subgaleal ALONA x1, monitor output   - Keppra 1g BID   - Decadron 3 day taper   - CTA 3/2: possible PCOMM vasospasm  - Nimodipine 60q4 until BD21 (held due to bradycardia)    PULM  - Extubated 3/2, RA  - IS     CARDIO  - SBP   - Echo pending     GI  - NPO   - Bowel regimen   - Last BM 2/27   - Zofran prn nausea   - Protonix while on steroids     ENDO  - ISS, A1c 5.3     RENAL  - IVF @ 100/hr while NPO   - Voiding   - Euvolemia goal     HEME  - SCDs    ID  - Afebrile  - post op ancef    Dispo:   - ICU status, full code, dispo pending   - PT/OT     Assessment and plan discussed with Dr. Wiseman and Dr. Gomez       Assessment:  Present when checked    []  GCS  E   V  M     Heart Failure: []Acute, [] acute on chronic , []chronic  Heart Failure:  [] Diastolic (HFpEF), [] Systolic (HFrEF), []Combined (HFpEF and HFrEF), [] RHF, [] Pulm HTN, [] Other    [] EDISON, [] ATN, [] AIN, [] other  [] CKD1, [] CKD2, [] CKD 3, [] CKD 4, [] CKD 5, []ESRD    Encephalopathy: [] Metabolic, [] Hepatic, [] toxic, [] Neurological, [] Other    Abnormal Nurtitional Status: [] malnurtition (see nutrition note), [ ]underweight: BMI < 19, [] morbid obesity: BMI >40, [] Cachexia    [] Sepsis  [] hypovolemic shock,[] cardiogenic shock, [] hemorrhagic shock, [] neuogenic shock  [] Acute Respiratory Failure  []Cerebral edema, [] Brain compression/ herniation,   [] Functional quadriplegia  [] Acute blood loss anemia

## 2023-03-03 NOTE — BRIEF OPERATIVE NOTE - OPERATION/FINDINGS
Patient was brought to the angio suite, placed on x-ray table, placed on standard monitor by anesthesiologist. B/l groins were prepped and draped in usual sterile fashion.   4 [place] shore sheath was used in the left common femoral artery for access. A diagnostic angiogram was performed under monitored anesthesia care. Right ICA was injected and studied.     Findings:  There is evidence of 2 micro-clips with complete obliteration of previously seen right Pcomm aneurysm.   Right Pcomm is patent.     Full report to follow  Patient tolerated the procedure well and at baseline neurological condition.   Right groin vascular access site applied manual compression.   There is no hematoma.   Patient was transferred to NSICU    Above discussed with Dr. Marina. Patient was brought to the angio suite, placed on x-ray table, placed on standard monitor by anesthesiologist. B/l groins were prepped and draped in usual sterile fashion.   4 [place] shore sheath was used in the left common femoral artery for access. A diagnostic angiogram was performed under monitored anesthesia care. Right ICA was injected and studied.     Findings:  There is evidence of 2 micro-clips with complete clip ligation of previously seen right Pcomm aneurysm. No residual  Right Pcomm is patent.     Full report to follow  Patient tolerated the procedure well and at baseline neurological condition.   Right groin vascular access site applied manual compression.   There is no hematoma.   Patient was transferred to NSICU    Above discussed with Dr. Marina.

## 2023-03-03 NOTE — PRE-ANESTHESIA EVALUATION ADULT - NSANTHOSAYNRD_GEN_A_CORE
No. LULU screening performed.  STOP BANG Legend: 0-2 = LOW Risk; 3-4 = INTERMEDIATE Risk; 5-8 = HIGH Risk

## 2023-03-03 NOTE — PROGRESS NOTE ADULT - SUBJECTIVE AND OBJECTIVE BOX
=================================  NEUROCRITICAL CARE ATTENDING NOTE  =================================    JUANITO MASON   MRN-[id number]  Summary:  32y/M with PMHx of anxiety who originally presented to [facility] with severe headache, nausea with emesis x 1 and diaphoresis and was found to have a right 6mm ICA aneurysm on CTA. Patient reports that this morning he woke up with a severe headache that was accompanied by an episode of nausea and vomiting. He also endorses that the headache was so severe he became extremely anxious, felt as if he was having a panic attack and became diaphoretic. He states that headaches are controlled from the pain medicine he received while at [place] and did not have any additional episodes of nausea/vomiting. Pending further workup with repeat CTH/CTA and possible cerebral angiogram  Denies visual changes, dizziness, cigarette smoking, family hx of brain aneurysm, previous recurrent headaches, changes in sensation, mental status, unilateral weakness, confusion. (2023 23:14)    COURSE IN THE HOSPITAL:   admitted to [facility], s/p angio, unable to coil aneurysm   s/p clipping, 1L EBL given 2 units pRBC introperatively, remained intubated; intraop - aneurysm appears to be s/p rupture (consider  ictus, now BD#3); post-op bradycardia requiring atropine, junctional rhythm resolved   extubated CTA shows mild PComm vasospasm   s/p angio    Past Medical History: Anxiety  Allergies:  No Known Allergies  Home meds:     PHYSICAL EXAMINATION  T(C): 37.1 ( @ 17:28), Max: 37.6 ( @ 21:00) HR: 59 ( @ 18:00) (56 - 70) BP: 106/56 ( @ 18:00) (94/50 - 116/55) RR: 19 ( @ 18:00) (12 - 31) SpO2: 98% ( @ 18:00) (94% - 100%)  NEUROLOGIC EXAMINATION:  awake, alert, fully oriented, pupils 2-3mm equal and briskly reactive to light, EOMs intact, L UE/LE 4+/5, L UE drift R UE/LE 5/5  GENERAL: extubated  EENT:  anicteric  CARDIOVASCULAR: (+) S1 S2, bradycardic rate and regular rhythm  PULMONARY: clear to auscultation bilaterally  ABDOMEN: soft, nontender with normoactive bowel sounds  EXTREMITIES: no edema  SKIN: no rash    LABS:   CAPILLARY BLOOD GLUCOSE 108 133    (17.65)  10.7 (11.4)  15.70 )-----------( 186      ( 03 Mar 2023 15:50 )             31.8      139  |  106  |  11  ----------------------------<  108<H>  4.2   |  24  |  0.70    Ca    8.1<L>      03 Mar 2023 15:50  Phos  3.6       Mg     1.9         TPro  5.4<L>  /  Alb  3.1<L>  /  TBili  0.7  /  DBili  x   /  AST  12  /  ALT  11  /  AlkPhos  76   @ 07:01  -   @ 07:00  IN: 2800 mL / OUT: 3325 mL / NET: -525 mL    Bacteriology:  CSF studies:  CSF STUDIES    G   P   L   *** ZXD034,000 WBC22 *** %N   %L5     L2.0 *** OEO609,500 WBC22 *** %N   %L6     EEG:  Neuroimaging:  Other imaging:    MEDICATIONS:     ·	levETIRAcetam 1000 Oral two times a day  ·	niMODipine 60 milliGRAM(s) Oral every 4 hours  ·	bisacodyl 5 Oral every 12 hours  ·	pantoprazole    Tablet 40 Oral before breakfast  ·	polyethylene glycol 3350 17 Oral daily  ·	senna 2 Oral at bedtime  ·	dexAMETHasone     Tablet 1 Oral every 6 hours  ·	acetaminophen    Suspension .. 650 Oral every 6 hours PRN  ·	hydrALAZINE Injectable 10 IV Push every 4 hours PRN  ·	ondansetron Injectable 4 IV Push every 6 hours PRN  ·	traMADol 100 Oral every 6 hours PRN    IV FLUIDS: NS@100  DRIPS:  DIET: NPO   Lines: A Line   Drains:    Wounds:    CODE STATUS:  Full Code                       GOALS OF CARE:  aggressive                      DISPOSITION:  ICU =================================  NEUROCRITICAL CARE ATTENDING NOTE  =================================    JUANITO MASON   MRN-[id number]  Summary:  32y/M with PMHx of anxiety who originally presented to [facility] with severe headache, nausea with emesis x 1 and diaphoresis and was found to have a right 6mm ICA aneurysm on CTA. Patient reports that this morning he woke up with a severe headache that was accompanied by an episode of nausea and vomiting. He also endorses that the headache was so severe he became extremely anxious, felt as if he was having a panic attack and became diaphoretic. He states that headaches are controlled from the pain medicine he received while at [place] and did not have any additional episodes of nausea/vomiting. Pending further workup with repeat CTH/CTA and possible cerebral angiogram  Denies visual changes, dizziness, cigarette smoking, family hx of brain aneurysm, previous recurrent headaches, changes in sensation, mental status, unilateral weakness, confusion. (2023 23:14)    COURSE IN THE HOSPITAL:   admitted to [facility], s/p angio, unable to coil aneurysm   s/p clipping, 1L EBL given 2 units pRBC introperatively, remained intubated; intraop - aneurysm appears to be s/p rupture (consider  ictus, now BD#3); post-op bradycardia requiring atropine, junctional rhythm resolved   extubated CTA shows mild PComm vasospasm   BD#5 s/p angio - no vasopasm    Past Medical History: Anxiety  Allergies:  No Known Allergies  Home meds:     PHYSICAL EXAMINATION  T(C): 37.1 ( @ 17:28), Max: 37.6 ( @ 21:00) HR: 59 ( @ 18:00) (56 - 70) BP: 106/56 ( @ 18:00) (94/50 - 116/55) RR: 19 ( @ 18:00) (12 - 31) SpO2: 98% ( @ 18:00) (94% - 100%)  NEUROLOGIC EXAMINATION:  awake, alert, fully oriented, pupils 2-3mm equal and briskly reactive to light, EOMs intact, no drift, no droop, moving all 4s 5/5  GENERAL: room air  EENT:  anicteric  CARDIOVASCULAR: (+) S1 S2, bradycardic rate and regular rhythm  PULMONARY: clear to auscultation bilaterally  ABDOMEN: soft, nontender with normoactive bowel sounds  EXTREMITIES: no edema  SKIN: no rash    LABS:   CAPILLARY BLOOD GLUCOSE 108 133    (17.65)  10.7 (11.4)  15.70 )-----------( 186      ( 03 Mar 2023 15:50 )             31.8      139  |  106  |  11  ----------------------------<  108<H>  4.2   |  24  |  0.70    Ca    8.1<L>      03 Mar 2023 15:50  Phos  3.6       Mg     1.9         TPro  5.4<L>  /  Alb  3.1<L>  /  TBili  0.7  /  DBili  x   /  AST  12  /  ALT  11  /  AlkPhos  76   @ 07:01  -  - @ 07:00  IN: 2800 mL / OUT: 3325 mL / NET: -525 mL    Bacteriology:  CSF studies:  CSF STUDIES    G   P   L   *** XKY205,000 WBC22 *** %N   %L5     L2.0 *** ECO214,500 WBC22 *** %N   %L6     EEG:  Neuroimaging:  Other imaging:    MEDICATIONS:     ·	levETIRAcetam 1000 Oral two times a day  ·	niMODipine 60 milliGRAM(s) Oral every 4 hours  ·	bisacodyl 5 Oral every 12 hours  ·	pantoprazole    Tablet 40 Oral before breakfast  ·	polyethylene glycol 3350 17 Oral daily  ·	senna 2 Oral at bedtime  ·	dexAMETHasone     Tablet 1 Oral every 6 hours  ·	acetaminophen    Suspension .. 650 Oral every 6 hours PRN  ·	hydrALAZINE Injectable 10 IV Push every 4 hours PRN  ·	ondansetron Injectable 4 IV Push every 6 hours PRN  ·	traMADol 100 Oral every 6 hours PRN    IV FLUIDS: NS@100  DRIPS:  DIET: regular diet  Lines: A Line   Drains:    Wounds:    CODE STATUS:  Full Code                       GOALS OF CARE:  aggressive                      DISPOSITION:  ICU

## 2023-03-03 NOTE — BRIEF OPERATIVE NOTE - NSICDXBRIEFPOSTOP_GEN_ALL_CORE_FT
POST-OP DIAGNOSIS:  Cerebral aneurysm 28-Feb-2023 13:57:26  Tod Moser  

## 2023-03-03 NOTE — BRIEF OPERATIVE NOTE - ASSISTANT(S)
OG JOSHUA   is a   36   year old    female who is being seen in consultation at the request of   AKIL ZAVALA   for rectal bleeding since 11/2020. She reports intermittent episodes of painless BRBPR seen on wipe and in toilet bowel water, which occurs 1-2x/month. She reports constipation with BMs every 3-4 days, BSS type 2-3 predominately and BSS type 5-6 when having diarrhea. She notes LUQ pain  described as "pressure" that improves with defecation. She also mentions abdominal "bloating" and concern for infection given h/o h pylori dx on stool test in 2019 s/p treatment course however, no confirmation of infection eradication. She has DMT2 non insulin dependent. Rare NSAID use. No cardiac or pulmonary disease. Denies chest pain, n/v, dysphagia, early satiety, lower abdominal pain, weight loss. No other complaints. 
Wally WYMAN, Patricia WYMAN
Ehsan CAMARILLO
Ian Sutton MD

## 2023-03-04 LAB
ANION GAP SERPL CALC-SCNC: 6 MMOL/L — SIGNIFICANT CHANGE UP (ref 5–17)
BUN SERPL-MCNC: 10 MG/DL — SIGNIFICANT CHANGE UP (ref 7–23)
CALCIUM SERPL-MCNC: 7.4 MG/DL — LOW (ref 8.4–10.5)
CHLORIDE SERPL-SCNC: 112 MMOL/L — HIGH (ref 96–108)
CO2 SERPL-SCNC: 23 MMOL/L — SIGNIFICANT CHANGE UP (ref 22–31)
CREAT SERPL-MCNC: 0.62 MG/DL — SIGNIFICANT CHANGE UP (ref 0.5–1.3)
EGFR: 130 ML/MIN/1.73M2 — SIGNIFICANT CHANGE UP
GLUCOSE SERPL-MCNC: 101 MG/DL — HIGH (ref 70–99)
HCT VFR BLD CALC: 30.1 % — LOW (ref 39–50)
HGB BLD-MCNC: 10.2 G/DL — LOW (ref 13–17)
MAGNESIUM SERPL-MCNC: 1.4 MG/DL — LOW (ref 1.6–2.6)
MCHC RBC-ENTMCNC: 31.6 PG — SIGNIFICANT CHANGE UP (ref 27–34)
MCHC RBC-ENTMCNC: 33.9 GM/DL — SIGNIFICANT CHANGE UP (ref 32–36)
MCV RBC AUTO: 93.2 FL — SIGNIFICANT CHANGE UP (ref 80–100)
NRBC # BLD: 0 /100 WBCS — SIGNIFICANT CHANGE UP (ref 0–0)
PHOSPHATE SERPL-MCNC: 2.5 MG/DL — SIGNIFICANT CHANGE UP (ref 2.5–4.5)
PLATELET # BLD AUTO: 129 K/UL — LOW (ref 150–400)
POTASSIUM SERPL-MCNC: 3.4 MMOL/L — LOW (ref 3.5–5.3)
POTASSIUM SERPL-SCNC: 3.4 MMOL/L — LOW (ref 3.5–5.3)
RBC # BLD: 3.23 M/UL — LOW (ref 4.2–5.8)
RBC # FLD: 14.4 % — SIGNIFICANT CHANGE UP (ref 10.3–14.5)
SODIUM SERPL-SCNC: 141 MMOL/L — SIGNIFICANT CHANGE UP (ref 135–145)
WBC # BLD: 11.43 K/UL — HIGH (ref 3.8–10.5)
WBC # FLD AUTO: 11.43 K/UL — HIGH (ref 3.8–10.5)

## 2023-03-04 PROCEDURE — 99291 CRITICAL CARE FIRST HOUR: CPT

## 2023-03-04 RX ORDER — ENOXAPARIN SODIUM 100 MG/ML
40 INJECTION SUBCUTANEOUS
Refills: 0 | Status: DISCONTINUED | OUTPATIENT
Start: 2023-03-04 | End: 2023-03-08

## 2023-03-04 RX ORDER — ALBUMIN HUMAN 25 %
250 VIAL (ML) INTRAVENOUS ONCE
Refills: 0 | Status: COMPLETED | OUTPATIENT
Start: 2023-03-04 | End: 2023-03-04

## 2023-03-04 RX ORDER — NIMODIPINE 60 MG/10ML
30 SOLUTION ORAL
Refills: 0 | Status: DISCONTINUED | OUTPATIENT
Start: 2023-03-04 | End: 2023-03-08

## 2023-03-04 RX ORDER — SODIUM,POTASSIUM PHOSPHATES 278-250MG
1 POWDER IN PACKET (EA) ORAL ONCE
Refills: 0 | Status: COMPLETED | OUTPATIENT
Start: 2023-03-04 | End: 2023-03-04

## 2023-03-04 RX ORDER — SODIUM CHLORIDE 9 MG/ML
1000 INJECTION INTRAMUSCULAR; INTRAVENOUS; SUBCUTANEOUS ONCE
Refills: 0 | Status: COMPLETED | OUTPATIENT
Start: 2023-03-04 | End: 2023-03-04

## 2023-03-04 RX ORDER — LACTULOSE 10 G/15ML
10 SOLUTION ORAL ONCE
Refills: 0 | Status: COMPLETED | OUTPATIENT
Start: 2023-03-04 | End: 2023-03-04

## 2023-03-04 RX ORDER — MAGNESIUM SULFATE 500 MG/ML
4 VIAL (ML) INJECTION ONCE
Refills: 0 | Status: COMPLETED | OUTPATIENT
Start: 2023-03-04 | End: 2023-03-04

## 2023-03-04 RX ORDER — CEFAZOLIN SODIUM 1 G
2000 VIAL (EA) INJECTION EVERY 8 HOURS
Refills: 0 | Status: COMPLETED | OUTPATIENT
Start: 2023-03-04 | End: 2023-03-06

## 2023-03-04 RX ORDER — ACETAMINOPHEN 500 MG
650 TABLET ORAL EVERY 6 HOURS
Refills: 0 | Status: DISCONTINUED | OUTPATIENT
Start: 2023-03-04 | End: 2023-03-05

## 2023-03-04 RX ORDER — HYDROMORPHONE HYDROCHLORIDE 2 MG/ML
0.5 INJECTION INTRAMUSCULAR; INTRAVENOUS; SUBCUTANEOUS ONCE
Refills: 0 | Status: DISCONTINUED | OUTPATIENT
Start: 2023-03-04 | End: 2023-03-04

## 2023-03-04 RX ORDER — POTASSIUM CHLORIDE 20 MEQ
40 PACKET (EA) ORAL ONCE
Refills: 0 | Status: COMPLETED | OUTPATIENT
Start: 2023-03-04 | End: 2023-03-04

## 2023-03-04 RX ORDER — SODIUM CHLORIDE 9 MG/ML
500 INJECTION INTRAMUSCULAR; INTRAVENOUS; SUBCUTANEOUS ONCE
Refills: 0 | Status: COMPLETED | OUTPATIENT
Start: 2023-03-04 | End: 2023-03-04

## 2023-03-04 RX ORDER — CEFAZOLIN SODIUM 1 G
2000 VIAL (EA) INJECTION ONCE
Refills: 0 | Status: COMPLETED | OUTPATIENT
Start: 2023-03-04 | End: 2023-03-04

## 2023-03-04 RX ADMIN — TRAMADOL HYDROCHLORIDE 100 MILLIGRAM(S): 50 TABLET ORAL at 17:54

## 2023-03-04 RX ADMIN — Medication 1 TABLET(S): at 06:43

## 2023-03-04 RX ADMIN — Medication 25 GRAM(S): at 08:39

## 2023-03-04 RX ADMIN — ENOXAPARIN SODIUM 40 MILLIGRAM(S): 100 INJECTION SUBCUTANEOUS at 21:34

## 2023-03-04 RX ADMIN — Medication 100 MILLIGRAM(S): at 17:10

## 2023-03-04 RX ADMIN — Medication 40 MILLIEQUIVALENT(S): at 06:43

## 2023-03-04 RX ADMIN — LEVETIRACETAM 1000 MILLIGRAM(S): 250 TABLET, FILM COATED ORAL at 05:22

## 2023-03-04 RX ADMIN — Medication 125 MILLILITER(S): at 17:55

## 2023-03-04 RX ADMIN — LACTULOSE 10 GRAM(S): 10 SOLUTION ORAL at 10:52

## 2023-03-04 RX ADMIN — CHLORHEXIDINE GLUCONATE 1 APPLICATION(S): 213 SOLUTION TOPICAL at 05:24

## 2023-03-04 RX ADMIN — Medication 1 MILLIGRAM(S): at 00:35

## 2023-03-04 RX ADMIN — SODIUM CHLORIDE 1000 MILLILITER(S): 9 INJECTION INTRAMUSCULAR; INTRAVENOUS; SUBCUTANEOUS at 04:15

## 2023-03-04 RX ADMIN — LEVETIRACETAM 1000 MILLIGRAM(S): 250 TABLET, FILM COATED ORAL at 18:22

## 2023-03-04 RX ADMIN — HYDROMORPHONE HYDROCHLORIDE 0.5 MILLIGRAM(S): 2 INJECTION INTRAMUSCULAR; INTRAVENOUS; SUBCUTANEOUS at 08:00

## 2023-03-04 RX ADMIN — TRAMADOL HYDROCHLORIDE 100 MILLIGRAM(S): 50 TABLET ORAL at 18:45

## 2023-03-04 RX ADMIN — Medication 1 MILLIGRAM(S): at 14:08

## 2023-03-04 RX ADMIN — Medication 1 MILLIGRAM(S): at 05:23

## 2023-03-04 RX ADMIN — Medication 5 MILLIGRAM(S): at 05:23

## 2023-03-04 RX ADMIN — NIMODIPINE 30 MILLIGRAM(S): 60 SOLUTION ORAL at 10:52

## 2023-03-04 RX ADMIN — SODIUM CHLORIDE 500 MILLILITER(S): 9 INJECTION INTRAMUSCULAR; INTRAVENOUS; SUBCUTANEOUS at 06:44

## 2023-03-04 RX ADMIN — HYDROMORPHONE HYDROCHLORIDE 0.5 MILLIGRAM(S): 2 INJECTION INTRAMUSCULAR; INTRAVENOUS; SUBCUTANEOUS at 07:49

## 2023-03-04 RX ADMIN — Medication 100 MILLIGRAM(S): at 08:40

## 2023-03-04 RX ADMIN — PANTOPRAZOLE SODIUM 40 MILLIGRAM(S): 20 TABLET, DELAYED RELEASE ORAL at 06:31

## 2023-03-04 NOTE — PROGRESS NOTE ADULT - SUBJECTIVE AND OBJECTIVE BOX
=================================  NEUROCRITICAL CARE ATTENDING NOTE  =================================    JUANITO MASON   MRN-[id number]  Summary:  32y/M with PMHx of anxiety who originally presented to [facility] with severe headache, nausea with emesis x 1 and diaphoresis and was found to have a right 6mm ICA aneurysm on CTA. Patient reports that this morning he woke up with a severe headache that was accompanied by an episode of nausea and vomiting. He also endorses that the headache was so severe he became extremely anxious, felt as if he was having a panic attack and became diaphoretic. He states that headaches are controlled from the pain medicine he received while at [place] and did not have any additional episodes of nausea/vomiting. Pending further workup with repeat CTH/CTA and possible cerebral angiogram  Denies visual changes, dizziness, cigarette smoking, family hx of brain aneurysm, previous recurrent headaches, changes in sensation, mental status, unilateral weakness, confusion. (2023 23:14)    COURSE IN THE HOSPITAL:   admitted to [facility], s/p angio, unable to coil aneurysm   s/p clipping, 1L EBL given 2 units pRBC introperatively, remained intubated; intraop - aneurysm appears to be s/p rupture (consider  ictus, now BD#3); post-op bradycardia requiring atropine, junctional rhythm resolved   extubated CTA shows mild PComm vasospasm   BD#5 s/p angio - no vasopasm   BD#6    Past Medical History: Anxiety  Allergies:  No Known Allergies  Home meds:     PHYSICAL EXAMINATION   T(C): 36.5 ( @ 18:04), Max: 37.4 ( @ 00:56) HR: 75 ( @ 20:00) (54 - 77) BP: 107/62 (- @ 20:00) (97/52 - 113/70)RR: 29 ( @ 20:00) (12 - 29) SpO2: 96% ( @ 20:00) (94% - 99%)  NEUROLOGIC EXAMINATION:  awake, alert, fully oriented, pupils 2-3mm equal and briskly reactive to light, EOMs intact, no drift, no droop, moving all 4s 5/5  GENERAL: room air  EENT:  anicteric  CARDIOVASCULAR: (+) S1 S2, bradycardic rate and regular rhythm  PULMONARY: clear to auscultation bilaterally  ABDOMEN: soft, nontender with normoactive bowel sounds  EXTREMITIES: no edema  SKIN: no rash    LABS:              (15.70)  10.2 (10.7)  11.43 )-----------( 129   (186)   ( 04 Mar 2023 05:36 )             30.1   (139)  141  |  112<H>  |  10  ----------------------------<  101<H>  3.4<L>   |  23  |  0.62    Ca    7.4<L>      04 Mar 2023 05:36  Phos  2.5     -  Mg     1.4      @ 07:01  -   @ 07:00  IN: 3450 mL / OUT: 4690 mL / NET: -1240 mL    Bacteriology:  CSF studies:  CSF STUDIES   02- G   P   L   *** JET388,000 WBC22 *** %N   %L5   02-28  L2.0 *** NGE763,500 WBC22 *** %N   %L6     EEG:  Neuroimaging:  Other imaging:    MEDICATIONS:     ·	enoxaparin Injectable 40 SubCutaneous <User Schedule>  ·	ceFAZolin   IVPB 2000 IV Intermittent every 8 hours  ·	levETIRAcetam 1000 Oral two times a day  ·	niMODipine 30 milliGRAM(s) Oral every 2 hours  ·	bisacodyl 5 Oral every 12 hours  ·	polyethylene glycol 3350 17 Oral daily  ·	senna 2 Oral at bedtime  ·	acetaminophen     Tablet .. 650 Oral every 6 hours PRN  ·	hydrALAZINE Injectable 10 IV Push every 4 hours PRN  ·	ondansetron Injectable 4 IV Push every 6 hours PRN  ·	traMADol 100 Oral every 6 hours PRN    IV FLUIDS: IVL  DRIPS:  DIET: regular diet  Lines: A Line   Drains:    Wounds:    CODE STATUS:  Full Code                       GOALS OF CARE:  aggressive                      DISPOSITION:  ICU

## 2023-03-04 NOTE — PROGRESS NOTE ADULT - SUBJECTIVE AND OBJECTIVE BOX
INTERVAL HISTORY: HPI:  Patient is a 33yo M with PMHx of anxiety who originally presented to [facility] with severe headache, nausea with emesis x 1 and diaphoresis and was found to have a right 6mm ICA aneurysm on CTA. Patient reports that this morning he woke up with a severe headache that was accompanied by an episode of nausea and vomiting. He also endorses that the headache was so severe he became extremely anxious, felt as if he was having a panic attack and became diaphoretic. He states that headaches are controlled from the pain medicine he received while at [place] and did not have any additional episodes of nausea/vomiting. Pending further workup with repeat CTH/CTA and possible cerebral angiogram     Denies visual changes, dizziness, cigarette smoking, family hx of brain aneurysm, previous recurrent headaches, changes in sensation, mental status, unilateral weakness, confusion. (27 Feb 2023 23:14)    PAST MEDICAL & SURGICAL HISTORY:  Anxiety  No significant past surgical history    REVIEW OF SYSTEMS: [ ] Unable to Assess due to neurologic exam   [x] All ROS addressed below are non-contributory, except:  Neuro: [ ] Headache [ ] Back pain [ ] Numbness [ ] Weakness [ ] Ataxia [ ] Dizziness [ ] Aphasia [ ] Dysarthria [ ] Visual disturbance  Resp: [ ] Shortness of breath/dyspnea, [ ] Orthopnea [ ] Cough  CV: [ ] Chest pain [ ] Palpitation [ ] Lightheadedness [ ] Syncope  Renal: [ ] Thirst [ ] Edema  GI: [ ] Nausea [ ] Emesis [ ] Abdominal pain [ ] Constipation [ ] Diarrhea  Hem: [ ] Hematemesis [ ] bright red blood per rectum  ID: [ ] Fever [ ] Chills [ ] Dysuria  ENT: [ ] Rhinorrhea    PHYSICAL EXAM:    General: No Acute Distress, intubated   Neurological: eyes open to voice, follows commands, pupils 2mm reactive, b/l UE 5/5 b/l LE 5/5   Pulmonary: Clear to Auscultation, No Rales, No Rhonchi, No Wheezes   Cardiovascular: S1, S2, Regular Rate and Rhythm   Gastrointestinal: Soft, Nontender, Nondistended   Extremities: No calf tenderness   Incision: R groin w/o hematoma    ICU Vital Signs Last 24 Hrs  T(C): 36.6 (04 Mar 2023 05:26), Max: 37.4 (04 Mar 2023 00:56)  T(F): 97.9 (04 Mar 2023 05:26), Max: 99.3 (04 Mar 2023 00:56)  HR: 67 (04 Mar 2023 08:00) (54 - 72)  BP: 105/57 (04 Mar 2023 07:00) (95/54 - 113/65)  BP(mean): 76 (04 Mar 2023 07:00) (68 - 82)  ABP: 118/68 (04 Mar 2023 08:00) (96/50 - 129/73)  ABP(mean): 86 (04 Mar 2023 08:00) (66 - 92)  RR: 15 (04 Mar 2023 08:00) (12 - 25)  SpO2: 94% (04 Mar 2023 08:00) (94% - 100%)      03-03-23 @ 07:01  -  03-04-23 @ 07:00  --------------------------------------------------------  IN: 3450 mL / OUT: 4690 mL / NET: -1240 mL    03-04-23 @ 07:01  -  03-04-23 @ 08:52  --------------------------------------------------------  IN: 890 mL / OUT: 1050 mL / NET: -160 mL            albumin human  5% IVPB 250 milliLiter(s) IV Intermittent once  bisacodyl 5 milliGRAM(s) Oral every 12 hours  ceFAZolin   IVPB 2000 milliGRAM(s) IV Intermittent every 8 hours  chlorhexidine 2% Cloths 1 Application(s) Topical <User Schedule>  dexAMETHasone     Tablet   Oral   dexAMETHasone     Tablet 1 milliGRAM(s) Oral every 6 hours  enoxaparin Injectable 40 milliGRAM(s) SubCutaneous <User Schedule>  hydrALAZINE Injectable 10 milliGRAM(s) IV Push every 4 hours PRN  influenza   Vaccine 0.5 milliLiter(s) IntraMuscular once  levETIRAcetam 1000 milliGRAM(s) Oral two times a day  niMODipine 30 milliGRAM(s) Oral every 2 hours  ondansetron Injectable 4 milliGRAM(s) IV Push every 6 hours PRN  pantoprazole    Tablet 40 milliGRAM(s) Oral before breakfast  polyethylene glycol 3350 17 Gram(s) Oral daily  senna 2 Tablet(s) Oral at bedtime  traMADol 100 milliGRAM(s) Oral every 6 hours PRN      LABS:  Na: 141 (03-04 @ 05:36), 139 (03-03 @ 15:50), 135 (03-03 @ 05:46), 135 (03-02 @ 05:22), 137 (03-01 @ 22:54)  K: 3.4 (03-04 @ 05:36), 4.2 (03-03 @ 15:50), 4.2 (03-03 @ 05:46), 4.2 (03-02 @ 05:22), 4.4 (03-01 @ 22:54)  Cl: 112 (03-04 @ 05:36), 106 (03-03 @ 15:50), 104 (03-03 @ 05:46), 106 (03-02 @ 05:22), 109 (03-01 @ 22:54)  CO2: 23 (03-04 @ 05:36), 24 (03-03 @ 15:50), 24 (03-03 @ 05:46), 21 (03-02 @ 05:22), 22 (03-01 @ 22:54)  BUN: 10 (03-04 @ 05:36), 11 (03-03 @ 15:50), 11 (03-03 @ 05:46), 12 (03-02 @ 05:22), 12 (03-01 @ 22:54)  Cr: 0.62 (03-04 @ 05:36), 0.70 (03-03 @ 15:50), 0.73 (03-03 @ 05:46), 0.82 (03-02 @ 05:22), 0.89 (03-01 @ 22:54)  Glu: 101(03-04 @ 05:36), 108(03-03 @ 15:50), 122(03-03 @ 05:46), 134(03-02 @ 05:22), 152(03-01 @ 22:54)    Hgb: 10.2 (03-04 @ 05:36), 10.7 (03-03 @ 15:50), 11.4 (03-03 @ 05:46), 12.5 (03-02 @ 05:22), 13.5 (03-01 @ 22:54)  Hct: 30.1 (03-04 @ 05:36), 31.8 (03-03 @ 15:50), 33.6 (03-03 @ 05:46), 36.7 (03-02 @ 05:22), 39.3 (03-01 @ 22:54)  WBC: 11.43 (03-04 @ 05:36), 15.70 (03-03 @ 15:50), 17.65 (03-03 @ 05:46), 19.60 (03-02 @ 05:22), 10.93 (03-01 @ 22:54)  Plt: 129 (03-04 @ 05:36), 186 (03-03 @ 15:50), 152 (03-03 @ 05:46), 220 (03-02 @ 05:22), 215 (03-01 @ 22:54)    INR: 1.18 03-03-23 @ 16:50, 1.27 03-03-23 @ 05:46, 1.22 03-01-23 @ 22:54  PTT: 16.6 03-03-23 @ 16:50, 18.2 03-03-23 @ 05:46, 24.6 03-01-23 @ 22:54      RADIOLOGY & ADDITIONAL STUDIES:  < from: CT Angio Neck w/ IV Cont (02.28.23 @ 01:15) >  Intracranial CTA: 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    1 mm superiorly directed outpouching of the paraclinoid right ICA which   may represent origin of the ophthalmic artery versus small aneurysm..    Extracranial CTA: No steno-occlusive disease.    < end of copied text >

## 2023-03-04 NOTE — CHART NOTE - NSCHARTNOTEFT_GEN_A_CORE
BD 6, POD 3 crani for clipping and POD 1 angio. NICK o/n. 1.5L NS bolus for euvolemia. ALONA with a lot of resistance during removal attempt, fellow at bedside, removed staples to open up incision and visualize the drain, drain was removed and 4 horizontal mattress sutures were placed. Started on ancef x  48 hours, given albumin for volume status. Given lactulose and had bowel movement.

## 2023-03-04 NOTE — PHYSICAL THERAPY INITIAL EVALUATION ADULT - GENERAL OBSERVATIONS, REHAB EVAL
pt received/returned semi-supine in bed +heplock, +tele, +cranial incision C/D/I, family present, in NAD

## 2023-03-04 NOTE — PROGRESS NOTE ADULT - ASSESSMENT
Assessment: 32M found to have ruptured PCOMM s/p DSA w/o intervention now s/p crani & clipping c/b intraop re-rupture   NEURO:  cerebral aneurysm s/p DSA pending OR crani clipping 3/1 - hemosiderin deposits seen now presumed ruptured (sentinal headache 2/27); c/b intra-op re-rupture  -s/p DSA for clip check   CTA w/ cerebral vasospasm  s/p LP w/ traumatic tap   -c/w keppra 1g BID   -dexamethasone taper to end today  DCI management- frequent TCDs, CTA 3/9 (10 days from sentinal headache)  nimodipine w/ hold parameters total 21days    -neuro check q1  -pain management w/ Tylenol & oxycodone  -PT/OT     PULMONARY:  saturating well on RA,   -continue to monitor on pulse o2     CARDIOVASCULAR:  monitor on telemetry   vitals q1  sbp goal 100-180;  [place] placed 2/28  bradycardia episodes s/p atropine now sinus       GASTROENTEROLOGY:   ensure BMs w/ Miralax & senna  Daily stool count, LBM prior to arrival 2/17      RENAL/:  -check BMP qd  -strict i/o's ;   -Na goal 135-145      ENDOCRINE:  Monitor FSG q6 hrs while NPO  FSG goal 120-180    HEME/ONC:  DVT ppx: pending nsgy approval   b/l SCDs    INFECTIOUS:   Monitor for fevers   ancef for 48hrs, need removal of suture/staples     Patient is critically ill, requiring critical care services.     I have personally and independently provided [ 35 ] minutes of critical care services.  This excludes any time spent on separate procedures or teaching.

## 2023-03-04 NOTE — PHYSICAL THERAPY INITIAL EVALUATION ADULT - PERTINENT HX OF CURRENT PROBLEM, REHAB EVAL
Patient is a 31yo M with PMHx of anxiety who originally presented to [facility] with severe headache, nausea with emesis x 1 and diaphoresis and was found to have a right 6mm ICA aneurysm on CTA, admitted, now s/p lumbar puncture 2/28 and s/p cerebral angiogram with findings of 7.8 mm R Pcomm aneurysm 2/28. now s/p R pteronial crani for R PCOMM clipping w/ ICG angio, intraoperative aneurysm rupture (3/1). s/p angio showing complete ligation and no residual aneurysm (3/3).

## 2023-03-04 NOTE — PROGRESS NOTE ADULT - SUBJECTIVE AND OBJECTIVE BOX
HPI:  Patient is a 33yo M with PMHx of anxiety who originally presented to [facility] with severe headache, nausea with emesis x 1 and diaphoresis and was found to have a right 6mm ICA aneurysm on CTA. Patient reports that this morning he woke up with a severe headache that was accompanied by an episode of nausea and vomiting. He also endorses that the headache was so severe he became extremely anxious, felt as if he was having a panic attack and became diaphoretic. He states that headaches are controlled from the pain medicine he received while at [facility] and did not have any additional episodes of nausea/vomiting. Pending further workup with repeat CTH/CTA and possible cerebral angiogram     Denies visual changes, dizziness, cigarette smoking, family hx of brain aneurysm, previous recurrent headaches, changes in sensation, mental status, unilateral weakness, confusion.    GCS 15, MF0,  HH 1 (27 Feb 2023 23:14)    INTERVAL EVENTS:  No acute complaints, pain is controlled    HOSPITAL COURSE:  2/28: Admitted for further workup, CTH/CTA obtained, LP done, r/o xanthochromia, traumatic LP resulting in blood in all tubes. POD0 s/p cerebral angiogram with findings of 7.8mm R Pcomm aneurysm. Plan for OR in the morning for clipping of aneurysm.   3/1: NICK o/n neuro stable. POD0 R crani for R pcomm aneurysm clipping c/b intraoperative aneurysm rupture, EBL 1L. post op CTH stable. remains intubated post op sedated on propofol/precedex. on thanh gtt. hypotensive 80s, salma 30s, given 1L NS and 1mg atropine. sedation held, thanh changed to levo gtt. restarted on propofol for sedation  3/2: BD4. RBD2. POD1. neuro stable. Propofol overnight while intubated. CTH stable post-op, CTA head and neck shows clipping of R pcomm aneurysm, lack of visualization of R pcomm and R P1 segment possibly secondary to vasospasm, will correlate clinically. Pending formal angiogram tomorrow. Extubated this am. Voiding.   3/3: BD5, RBD2. POD2. NICK o/n neuro stable. 500cc bolus NS given in AM for euvolemia. POD0 angiogram showing complete clip ligation of R pcomm aneurym, no residual. Liberalized SBP goal to 120-180, given 500cc NS bolus.   3/4: BD 6, POD 3. NICK o/n.     Vital Signs Last 24 Hrs  T(C): 37 (03 Mar 2023 21:19), Max: 37.4 (03 Mar 2023 00:47)  T(F): 98.6 (03 Mar 2023 21:19), Max: 99.3 (03 Mar 2023 00:47)  HR: 66 (03 Mar 2023 23:00) (56 - 72)  BP: 106/56 (03 Mar 2023 18:00) (94/50 - 111/60)  BP(mean): 74 (03 Mar 2023 18:00) (59 - 80)  RR: 13 (03 Mar 2023 23:00) (12 - 31)  SpO2: 95% (03 Mar 2023 23:00) (94% - 100%)    Parameters below as of 03 Mar 2023 23:00  Patient On (Oxygen Delivery Method): room air        I&O's Summary    02 Mar 2023 07:01  -  03 Mar 2023 07:00  --------------------------------------------------------  IN: 2800 mL / OUT: 3325 mL / NET: -525 mL    03 Mar 2023 07:01  -  04 Mar 2023 00:07  --------------------------------------------------------  IN: 2450 mL / OUT: 2060 mL / NET: 390 mL        PHYSICAL EXAM:  General: NAD, pt is comfortably sitting up in bed, on room air  HEENT: CN II-XII grossly intact, PERRL 3mm briskly reactive, EOMI b/l, face symmetric, tongue midline, neck FROM  Cardiovascular: RRR, normal S1 and S2   Respiratory: lungs CTAB, no wheezing, rhonchi, or crackles   GI: normoactive BS to auscultation, abd soft, NTND   Neuro: A&Ox3, No aphasia, speech clear, no dysmetria, no pronator drift. Follows commands.  ARMSTRONG x4 spontaneously, 5/5 strength in all extremities throughout. SILT throughout   Extremities: distal pulses 2+ x4  Wound/incision: headwrap in place, c/d/i, + ALONA; R groin soft, nontender, with gauze and tegaderm in place c/d/i      LABS:                        10.7   15.70 )-----------( 186      ( 03 Mar 2023 15:50 )             31.8     03-03    139  |  106  |  11  ----------------------------<  108<H>  4.2   |  24  |  0.70    Ca    8.1<L>      03 Mar 2023 15:50  Phos  3.6     03-03  Mg     1.9     03-03      PT/INR - ( 03 Mar 2023 16:50 )   PT: 14.1 sec;   INR: 1.18          PTT - ( 03 Mar 2023 16:50 )  PTT:16.6 sec        CAPILLARY BLOOD GLUCOSE      POCT Blood Glucose.: 108 mg/dL (03 Mar 2023 16:44)  POCT Blood Glucose.: 133 mg/dL (03 Mar 2023 11:11)      Drug Levels: [] N/A    CSF Analysis: [] N/A      Allergies    No Known Allergies    Intolerances      MEDICATIONS:  Antibiotics:    Neuro:  acetaminophen    Suspension .. 650 milliGRAM(s) Oral every 6 hours PRN  levETIRAcetam 1000 milliGRAM(s) Oral two times a day  ondansetron Injectable 4 milliGRAM(s) IV Push every 6 hours PRN  traMADol 100 milliGRAM(s) Oral every 6 hours PRN    Anticoagulation:    OTHER:  bisacodyl 5 milliGRAM(s) Oral every 12 hours  chlorhexidine 2% Cloths 1 Application(s) Topical <User Schedule>  dexAMETHasone     Tablet   Oral   dexAMETHasone     Tablet 1 milliGRAM(s) Oral every 6 hours  hydrALAZINE Injectable 10 milliGRAM(s) IV Push every 4 hours PRN  influenza   Vaccine 0.5 milliLiter(s) IntraMuscular once  niMODipine 60 milliGRAM(s) Oral every 4 hours  pantoprazole    Tablet 40 milliGRAM(s) Oral before breakfast  polyethylene glycol 3350 17 Gram(s) Oral daily  senna 2 Tablet(s) Oral at bedtime    IVF:    CULTURES:  Culture Results:   No growth to date (02-28 @ 05:49)    RADIOLOGY & ADDITIONAL TESTS:      ASSESSMENT:  Patient is a 33yo M with PMHx of anxiety who originally presented to [facility] with severe headache, nausea with emesis x 1 and diaphoresis and was found to have a right 6mm ICA aneurysm on CTA, admitted, now s/p lumbar puncture 2/28 and s/p cerebral angiogram with findings of 7.8 mm R Pcomm aneurysm 2/28. now s/p R pteronial crani for R PCOMM clipping w/ ICG angio, intraoperative aneurysm rupture (3/1). s/p angio showing complete ligation and no residual aneurysm (3/3).    PLAN  NEURO  - Neuro/vitals q 1 hours   - Pain control - Oxycodone 5 mg x 24 hours then prn, tramadol  - Post op CTH stable   - Subgaleal ALONA x1, monitor output   - Keppra 1g BID   - Decadron 3 day taper   - CTA 3/2: possible PCOMM vasospasm, CTA BD10 3/8   - Angio 3/3 negative for spasm, complete obliteration of aneurysm  - Nimodipine 60q4 until BD21 (held due to bradycardia)    PULM  - Extubated 3/2, RA  - IS     CARDIO  - -180  - Echo pending     GI  - reg diet  - Bowel regimen   - Last BM 2/27   - Zofran prn nausea   - Protonix while on steroids     ENDO  - ISS dc'd, A1c 5.3     RENAL  - IVL  - Voiding   - Euvolemia goal     HEME  - SCDs    ID  - Afebrile    Dispo:   - ICU status, full code, dispo pending   - PT/OT     Assessment and plan discussed with Dr. Wiseman and Dr. Mckeon

## 2023-03-04 NOTE — PHYSICAL THERAPY INITIAL EVALUATION ADULT - MODALITIES TREATMENT COMMENTS
smile, cheek puff, eyebrow raise: symmetrical. tongue protrusion: midline. visual tracking (H test): smooth pursuit. visual field test (quadrant test): WNL B/L. no DDK

## 2023-03-04 NOTE — PHYSICAL THERAPY INITIAL EVALUATION ADULT - ADDITIONAL COMMENTS
pt lives w/ his fiance on the 2nd floor of a walk up apartment building. Denies use of DME for ambulation prior to this admission. Denies hx of recent falls. states that he was independent in all ADLs.

## 2023-03-05 LAB
ANION GAP SERPL CALC-SCNC: 8 MMOL/L — SIGNIFICANT CHANGE UP (ref 5–17)
BUN SERPL-MCNC: 13 MG/DL — SIGNIFICANT CHANGE UP (ref 7–23)
CALCIUM SERPL-MCNC: 8.8 MG/DL — SIGNIFICANT CHANGE UP (ref 8.4–10.5)
CHLORIDE SERPL-SCNC: 103 MMOL/L — SIGNIFICANT CHANGE UP (ref 96–108)
CO2 SERPL-SCNC: 27 MMOL/L — SIGNIFICANT CHANGE UP (ref 22–31)
CREAT SERPL-MCNC: 0.77 MG/DL — SIGNIFICANT CHANGE UP (ref 0.5–1.3)
EGFR: 122 ML/MIN/1.73M2 — SIGNIFICANT CHANGE UP
GLUCOSE SERPL-MCNC: 103 MG/DL — HIGH (ref 70–99)
HCT VFR BLD CALC: 33.8 % — LOW (ref 39–50)
HGB BLD-MCNC: 11.4 G/DL — LOW (ref 13–17)
MAGNESIUM SERPL-MCNC: 1.7 MG/DL — SIGNIFICANT CHANGE UP (ref 1.6–2.6)
MCHC RBC-ENTMCNC: 31.2 PG — SIGNIFICANT CHANGE UP (ref 27–34)
MCHC RBC-ENTMCNC: 33.7 GM/DL — SIGNIFICANT CHANGE UP (ref 32–36)
MCV RBC AUTO: 92.6 FL — SIGNIFICANT CHANGE UP (ref 80–100)
NRBC # BLD: 0 /100 WBCS — SIGNIFICANT CHANGE UP (ref 0–0)
PHOSPHATE SERPL-MCNC: 3.8 MG/DL — SIGNIFICANT CHANGE UP (ref 2.5–4.5)
PLATELET # BLD AUTO: 213 K/UL — SIGNIFICANT CHANGE UP (ref 150–400)
POTASSIUM SERPL-MCNC: 4 MMOL/L — SIGNIFICANT CHANGE UP (ref 3.5–5.3)
POTASSIUM SERPL-SCNC: 4 MMOL/L — SIGNIFICANT CHANGE UP (ref 3.5–5.3)
RBC # BLD: 3.65 M/UL — LOW (ref 4.2–5.8)
RBC # FLD: 14 % — SIGNIFICANT CHANGE UP (ref 10.3–14.5)
SODIUM SERPL-SCNC: 138 MMOL/L — SIGNIFICANT CHANGE UP (ref 135–145)
WBC # BLD: 12.08 K/UL — HIGH (ref 3.8–10.5)
WBC # FLD AUTO: 12.08 K/UL — HIGH (ref 3.8–10.5)

## 2023-03-05 PROCEDURE — 99291 CRITICAL CARE FIRST HOUR: CPT

## 2023-03-05 PROCEDURE — 99024 POSTOP FOLLOW-UP VISIT: CPT

## 2023-03-05 RX ORDER — SODIUM CHLORIDE 9 MG/ML
1000 INJECTION, SOLUTION INTRAVENOUS ONCE
Refills: 0 | Status: COMPLETED | OUTPATIENT
Start: 2023-03-05 | End: 2023-03-05

## 2023-03-05 RX ORDER — TRAMADOL HYDROCHLORIDE 50 MG/1
50 TABLET ORAL EVERY 6 HOURS
Refills: 0 | Status: DISCONTINUED | OUTPATIENT
Start: 2023-03-05 | End: 2023-03-06

## 2023-03-05 RX ORDER — SODIUM CHLORIDE 9 MG/ML
500 INJECTION, SOLUTION INTRAVENOUS ONCE
Refills: 0 | Status: COMPLETED | OUTPATIENT
Start: 2023-03-05 | End: 2023-03-05

## 2023-03-05 RX ORDER — TRAMADOL HYDROCHLORIDE 50 MG/1
50 TABLET ORAL EVERY 6 HOURS
Refills: 0 | Status: DISCONTINUED | OUTPATIENT
Start: 2023-03-05 | End: 2023-03-05

## 2023-03-05 RX ORDER — ACETAMINOPHEN 500 MG
650 TABLET ORAL EVERY 6 HOURS
Refills: 0 | Status: DISCONTINUED | OUTPATIENT
Start: 2023-03-05 | End: 2023-03-06

## 2023-03-05 RX ORDER — MAGNESIUM SULFATE 500 MG/ML
2 VIAL (ML) INJECTION ONCE
Refills: 0 | Status: COMPLETED | OUTPATIENT
Start: 2023-03-05 | End: 2023-03-05

## 2023-03-05 RX ORDER — BRIVARACETAM 25 MG/1
50 TABLET, FILM COATED ORAL EVERY 12 HOURS
Refills: 0 | Status: DISCONTINUED | OUTPATIENT
Start: 2023-03-05 | End: 2023-03-10

## 2023-03-05 RX ADMIN — Medication 100 MILLIGRAM(S): at 16:24

## 2023-03-05 RX ADMIN — TRAMADOL HYDROCHLORIDE 50 MILLIGRAM(S): 50 TABLET ORAL at 19:45

## 2023-03-05 RX ADMIN — Medication 25 GRAM(S): at 06:44

## 2023-03-05 RX ADMIN — Medication 100 MILLIGRAM(S): at 00:30

## 2023-03-05 RX ADMIN — TRAMADOL HYDROCHLORIDE 100 MILLIGRAM(S): 50 TABLET ORAL at 22:07

## 2023-03-05 RX ADMIN — SODIUM CHLORIDE 1000 MILLILITER(S): 9 INJECTION, SOLUTION INTRAVENOUS at 06:47

## 2023-03-05 RX ADMIN — TRAMADOL HYDROCHLORIDE 100 MILLIGRAM(S): 50 TABLET ORAL at 08:26

## 2023-03-05 RX ADMIN — ENOXAPARIN SODIUM 40 MILLIGRAM(S): 100 INJECTION SUBCUTANEOUS at 21:22

## 2023-03-05 RX ADMIN — TRAMADOL HYDROCHLORIDE 100 MILLIGRAM(S): 50 TABLET ORAL at 15:05

## 2023-03-05 RX ADMIN — ONDANSETRON 4 MILLIGRAM(S): 8 TABLET, FILM COATED ORAL at 14:20

## 2023-03-05 RX ADMIN — SENNA PLUS 2 TABLET(S): 8.6 TABLET ORAL at 21:22

## 2023-03-05 RX ADMIN — LEVETIRACETAM 1000 MILLIGRAM(S): 250 TABLET, FILM COATED ORAL at 06:46

## 2023-03-05 RX ADMIN — TRAMADOL HYDROCHLORIDE 100 MILLIGRAM(S): 50 TABLET ORAL at 09:26

## 2023-03-05 RX ADMIN — Medication 100 MILLIGRAM(S): at 07:11

## 2023-03-05 RX ADMIN — TRAMADOL HYDROCHLORIDE 100 MILLIGRAM(S): 50 TABLET ORAL at 21:22

## 2023-03-05 RX ADMIN — TRAMADOL HYDROCHLORIDE 50 MILLIGRAM(S): 50 TABLET ORAL at 07:10

## 2023-03-05 RX ADMIN — TRAMADOL HYDROCHLORIDE 50 MILLIGRAM(S): 50 TABLET ORAL at 19:04

## 2023-03-05 RX ADMIN — TRAMADOL HYDROCHLORIDE 100 MILLIGRAM(S): 50 TABLET ORAL at 14:05

## 2023-03-05 RX ADMIN — SODIUM CHLORIDE 1000 MILLILITER(S): 9 INJECTION, SOLUTION INTRAVENOUS at 21:22

## 2023-03-05 RX ADMIN — BRIVARACETAM 50 MILLIGRAM(S): 25 TABLET, FILM COATED ORAL at 17:36

## 2023-03-05 RX ADMIN — CHLORHEXIDINE GLUCONATE 1 APPLICATION(S): 213 SOLUTION TOPICAL at 06:45

## 2023-03-05 RX ADMIN — TRAMADOL HYDROCHLORIDE 50 MILLIGRAM(S): 50 TABLET ORAL at 08:10

## 2023-03-05 NOTE — OCCUPATIONAL THERAPY INITIAL EVALUATION ADULT - MODIFIED CLINICAL TEST OF SENSORY INTEGRATION IN BALANCE TEST
Pt sat EOB ~3 mins, then spontaneously performed sit>stand transfer, per OT request pt returned seated EOB for cranial nerve and BUE/BLE MMT testing. However, seated EOB about 5 mins pt reports nausea, increased headache (from 5 to approx 6/7), and diaphoretic. Neurosx GARO Lafleur and RNs Sha and Patricia immediately notified and present in room after session.

## 2023-03-05 NOTE — PROGRESS NOTE ADULT - ASSESSMENT
Assessment: 32M found to have ruptured PCOMM s/p DSA w/o intervention now s/p crani & clipping c/b intraop re-rupture   NEURO:  cerebral aneurysm s/p DSA pending OR crani clipping 3/1 - hemosiderin deposits seen now presumed ruptured (sentinal headache 2/27); c/b intra-op re-rupture  -s/p DSA for clip check   CTA w/ cerebral vasospasm DSA negative   s/p LP w/ traumatic tap   -change  Keppra 1g BID to briviact 50mg BID   DCI management- frequent TCDs, CTA 3/9 (10 days from sentinal headache)  nimodipine w/ hold parameters total 21days    -neuro check q1  -pain management w/ Tylenol & oxycodone  -PT/OT   activity: ambulate as tolerated     PULMONARY:  saturating well on RA,   -continue to monitor on pulse o2     CARDIOVASCULAR:  monitor on telemetry   vitals q1  map goal >65;  ph[place] placed 2/28  bradycardia episodes s/p atropine now sinus         GASTROENTEROLOGY:   ensure BMs w/ Miralax & senna  Daily stool count, LBM 3/4  regular diet    RENAL/:  -check BMP qd  -strict i/o's ;   -Na goal 135-145  -maintain euvolumia & normonatremia      ENDOCRINE:  FSG goal 120-180    HEME/ONC:  DVT ppx: SQL  b/l SCDs    INFECTIOUS:   Monitor for fevers   ancef end tomoroow, need removal of suture/staples to open wound and remove drain on 3/4    Patient is critically ill, requiring critical care services.     I have personally and independently provided [ 45 ] minutes of critical care services.  This excludes any time spent on separate procedures or teaching.

## 2023-03-05 NOTE — OCCUPATIONAL THERAPY INITIAL EVALUATION ADULT - DIAGNOSIS, OT EVAL
Pt presents with decreased functional endurance impacting his ability to independently complete ADLs, functional transfers, and functional mobility.

## 2023-03-05 NOTE — PROGRESS NOTE ADULT - SUBJECTIVE AND OBJECTIVE BOX
HPI:  Patient is a 33yo M with PMHx of anxiety who originally presented to [facility] with severe headache, nausea with emesis x 1 and diaphoresis and was found to have a right 6mm ICA aneurysm on CTA. Patient reports that this morning he woke up with a severe headache that was accompanied by an episode of nausea and vomiting. He also endorses that the headache was so severe he became extremely anxious, felt as if he was having a panic attack and became diaphoretic. He states that headaches are controlled from the pain medicine he received while at [place] and did not have any additional episodes of nausea/vomiting. Pending further workup with repeat CTH/CTA and possible cerebral angiogram     Denies visual changes, dizziness, cigarette smoking, family hx of brain aneurysm, previous recurrent headaches, changes in sensation, mental status, unilateral weakness, confusion.      S/Overnight events: NICK overnight. Pain well controlled, no complaints at this time. Headaches controlled. SBP goal 100-180, o/n SBP dipped to 90s, asymptomatic, neuro exam remains intact, will bolus with LR as needed to maintain euvolemia.     Hospital Course:   2/28: Admitted for further workup, CTH/CTA obtained, LP done, r/o xanthochromia, traumatic LP resulting in blood in all tubes. POD0 s/p cerebral angiogram with findings of 7.8mm R Pcomm aneurysm. Plan for OR in the morning for clipping of aneurysm.   3/1: NICK o/n neuro stable. POD0 R crani for R pcomm aneurysm clipping c/b intraoperative aneurysm rupture, EBL 1L. post op CTH stable. remains intubated post op sedated on propofol/precedex. on thanh gtt. hypotensive 80s, salma 30s, given 1L NS and 1mg atropine. sedation held, thanh changed to levo gtt. restarted on propofol for sedation  3/2: BD4. RBD2. POD1. neuro stable. Propofol overnight while intubated. CTH stable post-op, CTA head and neck shows clipping of R pcomm aneurysm, lack of visualization of R pcomm and R P1 segment possibly secondary to vasospasm, will correlate clinically. Pending formal angiogram tomorrow. Extubated this am. Voiding.   3/3: BD5, RBD2. POD2. NICK o/n neuro stable. 500cc bolus NS given in AM for euvolemia. POD0 angiogram showing complete clip ligation of R pcomm aneurym, no residual. Liberalized SBP goal to 120-180, given 500cc NS bolus.   3/4: BD 6, POD 3 crani for clipping and POD 1 angio. NICK o/n. 1.5L NS bolus for euvolemia. ALONA with a lot of resistance during removal attempt, fellow at bedside, removed staples to open up incision and visualize the drain, drain was removed and 4 horizontal mattress sutures were placed. Started on ancef x  48 hours, given albumin for volume status. Given lactulose and had bowel movement.   3/5: BD7, POD 4 crani/clipping, POD2 dx angio. NICK overnight. Pain well controlled, no complaints at this time. Headaches controlled. SBP goal 100-180, o/n SBP dipped to 90s, asymptomatic, neuro exam remains intact, will bolus with LR as needed to maintain euvolemia. Cont keppra 1g BID, nimodipine if BP and/or HR allows. SQL started this evening, platelets downtrending (prior to starting SQL but after angio), cont to trend and clinically correlate for signs of HIT. Pend CTA BD10.         Vital Signs Last 24 Hrs  T(C): 36.9 (04 Mar 2023 22:06), Max: 37.4 (04 Mar 2023 00:56)  T(F): 98.5 (04 Mar 2023 22:06), Max: 99.3 (04 Mar 2023 00:56)  HR: 56 (05 Mar 2023 00:00) (54 - 77)  BP: 94/55 (05 Mar 2023 00:00) (91/53 - 113/70)  BP(mean): 65 (04 Mar 2023 23:00) (65 - 85)  RR: 12 (05 Mar 2023 00:00) (12 - 29)  SpO2: 96% (05 Mar 2023 00:00) (94% - 99%)    Parameters below as of 05 Mar 2023 00:00  Patient On (Oxygen Delivery Method): room air        I&O's Detail    03 Mar 2023 07:01  -  04 Mar 2023 07:00  --------------------------------------------------------  IN:    IV PiggyBack: 250 mL    sodium chloride 0.9%: 1200 mL    Sodium Chloride 0.9% Bolus: 2000 mL  Total IN: 3450 mL    OUT:    Bulb (mL): 90 mL    Voided (mL): 4600 mL  Total OUT: 4690 mL    Total NET: -1240 mL      04 Mar 2023 07:01  -  05 Mar 2023 00:24  --------------------------------------------------------  IN:    IV PiggyBack: 200 mL    Oral Fluid: 1680 mL    Sodium Chloride 0.9% Bolus: 500 mL  Total IN: 2380 mL    OUT:    Voided (mL): 2050 mL  Total OUT: 2050 mL    Total NET: 330 mL        I&O's Summary    03 Mar 2023 07:01  -  04 Mar 2023 07:00  --------------------------------------------------------  IN: 3450 mL / OUT: 4690 mL / NET: -1240 mL    04 Mar 2023 07:01  -  05 Mar 2023 00:24  --------------------------------------------------------  IN: 2380 mL / OUT: 2050 mL / NET: 330 mL        PHYSICAL EXAM:  General: NAD, pt is comfortably sitting up in bed, A&O x3, on RA  HEENT: CN II-XII grossly intact, PERRL 3mm, EOMI b/l, face symmetric, tongue midline, neck FROM  Cardiovascular: RRR, normal S1 and S2   Respiratory: lungs CTAB, no wheezing, rhonchi, or crackles   GI: normoactive BS to auscultation, abd soft, NTND   Neuro: no aphasia, speech clear, no dysmetria, no pronator drift  strength 5/5 throughout all 4 extremities  sensation intact to light touch throughout   Extremities: distal pulses 2+ x4   Wound/incision: staple and sutures in-place along R crani     TUBES/LINES:  [] CVC  [] A-line  [] Lumbar Drain  [] Ventriculostomy  [] Other    DIET:  [] NPO  [X] Mechanical  [] Tube feeds    LABS:  PT/INR - ( 03 Mar 2023 16:50 )   PT: 14.1 sec;   INR: 1.18          PTT - ( 03 Mar 2023 16:50 )  PTT:16.6 sec        CAPILLARY BLOOD GLUCOSE          Drug Levels: [] N/A    CSF Analysis: [] N/A      Allergies    No Known Allergies    Intolerances      MEDICATIONS:  Antibiotics:  ceFAZolin   IVPB 2000 milliGRAM(s) IV Intermittent every 8 hours    Neuro:  acetaminophen     Tablet .. 650 milliGRAM(s) Oral every 6 hours PRN  levETIRAcetam 1000 milliGRAM(s) Oral two times a day  ondansetron Injectable 4 milliGRAM(s) IV Push every 6 hours PRN  traMADol 100 milliGRAM(s) Oral every 6 hours PRN    Anticoagulation:  enoxaparin Injectable 40 milliGRAM(s) SubCutaneous <User Schedule>    OTHER:  bisacodyl 5 milliGRAM(s) Oral every 12 hours PRN  chlorhexidine 2% Cloths 1 Application(s) Topical <User Schedule>  hydrALAZINE Injectable 10 milliGRAM(s) IV Push every 4 hours PRN  influenza   Vaccine 0.5 milliLiter(s) IntraMuscular once  niMODipine 30 milliGRAM(s) Oral every 2 hours  polyethylene glycol 3350 17 Gram(s) Oral daily  senna 2 Tablet(s) Oral at bedtime    IVF:    CULTURES:  Culture Results:   No growth to date (02-28 @ 05:49)    RADIOLOGY & ADDITIONAL TESTS:      ASSESSMENT:  Patient is a 33yo M with PMHx of anxiety who originally presented to [facility] with severe headache, nausea with emesis x 1 and diaphoresis and was found to have a right 6mm ICA aneurysm on CTA, admitted, now s/p lumbar puncture 2/28 and s/p cerebral angiogram with findings of 7.8 mm R Pcomm aneurysm 2/28. now s/p R pteronial crani for R PCOMM clipping w/ ICG angio, intraoperative aneurysm rupture (3/1). s/p angio showing complete ligation and no residual aneurysm (3/3).      ANEURYSM    No pertinent family history in first degree relatives    Handoff    MEWS Score    Anxiety    Cerebral aneurysm    Cerebral aneurysm    Unruptured cerebral aneurysm    Anxiety    Pre-op evaluation    Leukocytosis    Lumbar puncture    Angiogram, carotid and cerebral, bilateral    Craniotomy, for aneurysm repair    No significant past surgical history    Room Service Assist    [email or web address]        PLAN:  NEURO  - Neuro/vitals q1 hours   - Pain control - tramadol PRN  - Post op CTH stable   - Subgaleal ALONA d/c'ed   - Keppra 1g BID   - Decadron 3 day taper   - CTA 3/2: possible PCOMM vasospasm, CTA BD10 3/8   - Angio 3/3 negative for spasm, complete obliteration of aneurysm  - Nimodipine 30mg q 2hrs until BD21 (hold parameters for hypotension and bradycardia)     PULM  - Extubated 3/2, RA  - IS     CARDIO  - -180  - Echo pending     GI  - reg diet  - Bowel regimen   - Last BM 3/4  - Zofran prn nausea   - Protonix while on steroids     ENDO  - ISS dc'd, A1c 5.3     RENAL  - IVL  - Voiding   - Euvolemia goal, LR or plasmalyte boluses PRN   - s/p albumin 250cc 3/4     HEME  - SCDs, SQL     ID  - Afebrile  - ancef x 48 hours for incision    Dispo:   - ICU status, full code, dispo pending   - PT/OT home no needs     Assessment and plan discussed with Dr. Wiseman and Dr. Gomez

## 2023-03-05 NOTE — OCCUPATIONAL THERAPY INITIAL EVALUATION ADULT - PLANNED THERAPY INTERVENTIONS, OT EVAL
23 y/o M PMHx of Anxiety and Depression ( was on Lexapro and alprazolam Hx of suicide attempts X2, GERD presented to ED with c/o abdominal pain since 3 days. Patient started to have epigastric abdominal pain starting july 2017 saw PCP in Dec who prescribed ranitidine for gastritis, and symptoms were under control, until 3 days back when abdominal pain got worse- describes as epigastric, intermittent, lasting for 2 hours, burning and constricting type, radiating to back, 6 to 9/10 intensity, used Tylenol once without relief and has been using ranitidine without any resolution of pain, aggravated with intake of spicy foods, no relieving factors, associated with nausea. Patient denies vomiting, fever, chills, diarrhea, constipation or any other complaints.    Patient was admitted for gallstone pancreatitis. GI was consulted, patient did not have any signs or symptoms of cbd obstruction. Patient was taken to the OR on1/22 and underwent laparoscopic cholecystectomy Patient tolerated procedure well, diet was advanced and patient was discharge home
normal (ped)...
ADL retraining/IADL retraining/balance training/bed mobility training/cognitive, visual perceptual/fine motor coordination training/motor coordination training/neuromuscular re-education/ROM/strengthening/transfer training

## 2023-03-05 NOTE — PROGRESS NOTE ADULT - SUBJECTIVE AND OBJECTIVE BOX
O: neuro exam stable     T(C): 36.4 (03-05-23 @ 18:04), Max: 37.1 (03-05-23 @ 01:25)  HR: 67 (03-05-23 @ 18:00) (50 - 75)  BP: 109/60 (03-05-23 @ 18:00) (88/53 - 115/64)  RR: 15 (03-05-23 @ 18:00) (12 - 29)  SpO2: 93% (03-05-23 @ 18:00) (93% - 98%)  03-04-23 @ 07:01  -  03-05-23 @ 07:00  --------------------------------------------------------  IN: 2980 mL / OUT: 2950 mL / NET: 30 mL    03-05-23 @ 07:01  -  03-05-23 @ 19:08  --------------------------------------------------------  IN: 100 mL / OUT: 725 mL / NET: -625 mL    acetaminophen     Tablet .. 650 milliGRAM(s) Oral every 6 hours PRN  bisacodyl 5 milliGRAM(s) Oral every 12 hours PRN  brivaracetam 50 milliGRAM(s) Oral every 12 hours  ceFAZolin   IVPB 2000 milliGRAM(s) IV Intermittent every 8 hours  chlorhexidine 2% Cloths 1 Application(s) Topical <User Schedule>  enoxaparin Injectable 40 milliGRAM(s) SubCutaneous <User Schedule>  hydrALAZINE Injectable 10 milliGRAM(s) IV Push every 4 hours PRN  influenza   Vaccine 0.5 milliLiter(s) IntraMuscular once  niMODipine 30 milliGRAM(s) Oral every 2 hours  ondansetron Injectable 4 milliGRAM(s) IV Push every 6 hours PRN  polyethylene glycol 3350 17 Gram(s) Oral daily  senna 2 Tablet(s) Oral at bedtime  traMADol 100 milliGRAM(s) Oral every 6 hours PRN  traMADol 50 milliGRAM(s) Oral every 6 hours PRN    EXAM   General: No Acute Distress,  Neurological: eyes open to voice, follows commands, pupils 2mm reactive, b/l UE 5/5 b/l LE 5/5   Pulmonary: Clear to Auscultation, No Rales, No Rhonchi, No Wheezes   Cardiovascular: S1, S2, Regular Rate and Rhythm   Gastrointestinal: Soft, Nontender, Nondistended   Extremities: No calf tenderness     LABS:  Na: 138 (03-05 @ 05:50), 141 (03-04 @ 05:36), 139 (03-03 @ 15:50), 135 (03-03 @ 05:46)  K: 4.0 (03-05 @ 05:50), 3.4 (03-04 @ 05:36), 4.2 (03-03 @ 15:50), 4.2 (03-03 @ 05:46)  Cl: 103 (03-05 @ 05:50), 112 (03-04 @ 05:36), 106 (03-03 @ 15:50), 104 (03-03 @ 05:46)  CO2: 27 (03-05 @ 05:50), 23 (03-04 @ 05:36), 24 (03-03 @ 15:50), 24 (03-03 @ 05:46)  BUN: 13 (03-05 @ 05:50), 10 (03-04 @ 05:36), 11 (03-03 @ 15:50), 11 (03-03 @ 05:46)  Cr: 0.77 (03-05 @ 05:50), 0.62 (03-04 @ 05:36), 0.70 (03-03 @ 15:50), 0.73 (03-03 @ 05:46)  Glu: 103(03-05 @ 05:50), 101(03-04 @ 05:36), 108(03-03 @ 15:50), 122(03-03 @ 05:46)    Hgb: 11.4 (03-05 @ 05:50), 10.2 (03-04 @ 05:36), 10.7 (03-03 @ 15:50), 11.4 (03-03 @ 05:46)  Hct: 33.8 (03-05 @ 05:50), 30.1 (03-04 @ 05:36), 31.8 (03-03 @ 15:50), 33.6 (03-03 @ 05:46)  WBC: 12.08 (03-05 @ 05:50), 11.43 (03-04 @ 05:36), 15.70 (03-03 @ 15:50), 17.65 (03-03 @ 05:46)  Plt: 213 (03-05 @ 05:50), 129 (03-04 @ 05:36), 186 (03-03 @ 15:50), 152 (03-03 @ 05:46)    INR: 1.18 03-03-23 @ 16:50, 1.27 03-03-23 @ 05:46  PTT: 16.6 03-03-23 @ 16:50, 18.2 03-03-23 @ 05:46            a/p aSAH  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4 (03-05-23 @ 18:04), Max: 37.1 (03-05-23 @ 01:25)  HR: 67 (03-05-23 @ 18:00) (50 - 75)  BP: 109/60 (03-05-23 @ 18:00) (88/53 - 115/64)  RR: 15 (03-05-23 @ 18:00) (12 - 29)  SpO2: 93% (03-05-23 @ 18:00) (93% - 98%)  03-04-23 @ 07:01  -  03-05-23 @ 07:00  --------------------------------------------------------  IN: 2980 mL / OUT: 2950 mL / NET: 30 mL    03-05-23 @ 07:01  -  03-05-23 @ 19:08  --------------------------------------------------------  IN: 100 mL / OUT: 725 mL / NET: -625 mL    acetaminophen     Tablet .. 650 milliGRAM(s) Oral every 6 hours PRN  bisacodyl 5 milliGRAM(s) Oral every 12 hours PRN  brivaracetam 50 milliGRAM(s) Oral every 12 hours  ceFAZolin   IVPB 2000 milliGRAM(s) IV Intermittent every 8 hours  chlorhexidine 2% Cloths 1 Application(s) Topical <User Schedule>  enoxaparin Injectable 40 milliGRAM(s) SubCutaneous <User Schedule>  hydrALAZINE Injectable 10 milliGRAM(s) IV Push every 4 hours PRN  influenza   Vaccine 0.5 milliLiter(s) IntraMuscular once  niMODipine 30 milliGRAM(s) Oral every 2 hours  ondansetron Injectable 4 milliGRAM(s) IV Push every 6 hours PRN  polyethylene glycol 3350 17 Gram(s) Oral daily  senna 2 Tablet(s) Oral at bedtime  traMADol 100 milliGRAM(s) Oral every 6 hours PRN  traMADol 50 milliGRAM(s) Oral every 6 hours PRN    EXAM   General: No Acute Distress,  Neurological: awake alert,  follows commands, pupils 2mm reactive, b/l UE 5/5 b/l LE 5/5   Pulmonary: Clear to Auscultation, No Rales, No Rhonchi, No Wheezes   Cardiovascular: S1, S2, Regular Rate and Rhythm   Gastrointestinal: Soft, Nontender, Nondistended   Extremities: No calf tenderness , no groin hematoma     LABS:  Na: 138 (03-05 @ 05:50), 141 (03-04 @ 05:36), 139 (03-03 @ 15:50), 135 (03-03 @ 05:46)  K: 4.0 (03-05 @ 05:50), 3.4 (03-04 @ 05:36), 4.2 (03-03 @ 15:50), 4.2 (03-03 @ 05:46)  Cl: 103 (03-05 @ 05:50), 112 (03-04 @ 05:36), 106 (03-03 @ 15:50), 104 (03-03 @ 05:46)  CO2: 27 (03-05 @ 05:50), 23 (03-04 @ 05:36), 24 (03-03 @ 15:50), 24 (03-03 @ 05:46)  BUN: 13 (03-05 @ 05:50), 10 (03-04 @ 05:36), 11 (03-03 @ 15:50), 11 (03-03 @ 05:46)  Cr: 0.77 (03-05 @ 05:50), 0.62 (03-04 @ 05:36), 0.70 (03-03 @ 15:50), 0.73 (03-03 @ 05:46)  Glu: 103(03-05 @ 05:50), 101(03-04 @ 05:36), 108(03-03 @ 15:50), 122(03-03 @ 05:46)    Hgb: 11.4 (03-05 @ 05:50), 10.2 (03-04 @ 05:36), 10.7 (03-03 @ 15:50), 11.4 (03-03 @ 05:46)  Hct: 33.8 (03-05 @ 05:50), 30.1 (03-04 @ 05:36), 31.8 (03-03 @ 15:50), 33.6 (03-03 @ 05:46)  WBC: 12.08 (03-05 @ 05:50), 11.43 (03-04 @ 05:36), 15.70 (03-03 @ 15:50), 17.65 (03-03 @ 05:46)  Plt: 213 (03-05 @ 05:50), 129 (03-04 @ 05:36), 186 (03-03 @ 15:50), 152 (03-03 @ 05:46)    INR: 1.18 03-03-23 @ 16:50, 1.27 03-03-23 @ 05:46  PTT: 16.6 03-03-23 @ 16:50, 18.2 03-03-23 @ 05:46            a/p aSAH  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   NEURO CHECKS Q 1 HR   DCI PROPHYLAXIS: EUVOLEMIA ( - 600 ML) LR 1000 ml bolus once , nimodipine on hold due to hypotension and bradycardia  brivaracetam 50 BID  for seizure prophylaxis per NS  , could d/c tomorrow   PT/OT/OBC     tramadol PRN for pain   CV : SBP goal   100-180 mmhg   TTE pending   Pulm: RA   GI: regular diet   last BM yesterday   Renal: see neuro   urinary retention bladder scan and straight cath as needed   ID afebrile  ancef till tomorrow, due to incisional manipulation for drain removal   Endo:  target sugar 120-180   Hem: SCD,  lovenox 40 mg sc qhs     35 critical care time as patient is at risk aneurysm re-rupture, seizures, hydrocephalus, DIC, stroke

## 2023-03-05 NOTE — OCCUPATIONAL THERAPY INITIAL EVALUATION ADULT - MD ORDER
Patient is a 31yo M with PMHx of anxiety who originally presented to [facility] with severe headache, nausea with emesis x 1 and diaphoresis and was found to have a right 6mm ICA aneurysm on CTA, admitted, now s/p lumbar puncture 2/28 and s/p cerebral angiogram with findings of 7.8 mm R Pcomm aneurysm 2/28. now s/p R pteronial crani for R PCOMM clipping w/ ICG angio, intraoperative aneurysm rupture (3/1). s/p angio showing complete ligation and no residual aneurysm (3/3).

## 2023-03-05 NOTE — OCCUPATIONAL THERAPY INITIAL EVALUATION ADULT - MANUAL MUSCLE TESTING RESULTS, REHAB EVAL
BUE 5/5 throughout, BLE grossly greater than or equal to 3+/5 based on functional assessment against gravity.

## 2023-03-05 NOTE — OCCUPATIONAL THERAPY INITIAL EVALUATION ADULT - NS ASR FOLLOW COMMAND OT EVAL
[Normal Sclera/Conjunctiva] : normal sclera/conjunctiva [Normal Outer Ear/Nose] : the outer ears and nose were normal in appearance [Normal] : affect was normal and insight and judgment were intact 100% of the time/able to follow single-step instructions

## 2023-03-05 NOTE — OCCUPATIONAL THERAPY INITIAL EVALUATION ADULT - GENERAL OBSERVATIONS, REHAB EVAL
OT IE Completed. MRS 3. Neurosx GARO Lafleur, Covering CHERI Gomez and Charge CHERI Dalton cleared pt for therapy. Pt received semisupine in bed, +tele, +R crani staples and dressing C/D/I, +b/l SCDs, +heplock, NAD, agreeable to OT. Pt's family present. Pt tolerated session poorly, limited by c/o nausea, diaphoretic and increased headache seated EOB. Charge CHERI Dalton, CHERI Gomez, and Neurosx GARO Lafleur immediately notified and present at end of session, VSS, returned to semisupine, all lines in tact.

## 2023-03-05 NOTE — OCCUPATIONAL THERAPY INITIAL EVALUATION ADULT - NSOTDISCHREC_GEN_A_CORE
To Be determined pending further functional assessment as session limited by c/o nausea, increased headache, and diaphoresis.

## 2023-03-05 NOTE — CHART NOTE - NSCHARTNOTEFT_GEN_A_CORE
BD7, POD 4 crani/clipping, POD2 dx angio. NICK overnight. Pain well controlled, no complaints at this time. Headaches controlled. SBP goal 100-180, o/n SBP dipped to 90s, asymptomatic, neuro exam remains intact, nimodipine if BP and/or HR allows. Platelets down in evening but normalized with AM labs. Pending CTA BD10. Anxiety issues overnight, keppra switched to briviact. Had episode of nausea and diaphoresis while working with OT, vital signs stable throughout, given zofran. Improved with resting and lying down.

## 2023-03-05 NOTE — PROGRESS NOTE ADULT - SUBJECTIVE AND OBJECTIVE BOX
INTERVAL HISTORY: HPI:  Patient is a 31yo M with PMHx of anxiety who originally presented to [facility] with severe headache, nausea with emesis x 1 and diaphoresis and was found to have a right 6mm ICA aneurysm on CTA. Patient reports that this morning he woke up with a severe headache that was accompanied by an episode of nausea and vomiting. He also endorses that the headache was so severe he became extremely anxious, felt as if he was having a panic attack and became diaphoretic. He states that headaches are controlled from the pain medicine he received while at [place] and did not have any additional episodes of nausea/vomiting. Pending further workup with repeat CTH/CTA and possible cerebral angiogram     Denies visual changes, dizziness, cigarette smoking, family hx of brain aneurysm, previous recurrent headaches, changes in sensation, mental status, unilateral weakness, confusion. (27 Feb 2023 23:14)    PAST MEDICAL & SURGICAL HISTORY:  Anxiety  No significant past surgical history    REVIEW OF SYSTEMS: [ ] Unable to Assess due to neurologic exam   [x] All ROS addressed below are non-contributory, except:  Neuro: [ ] Headache [ ] Back pain [ ] Numbness [ ] Weakness [ ] Ataxia [ ] Dizziness [ ] Aphasia [ ] Dysarthria [ ] Visual disturbance  Resp: [ ] Shortness of breath/dyspnea, [ ] Orthopnea [ ] Cough  CV: [ ] Chest pain [ ] Palpitation [ ] Lightheadedness [ ] Syncope  Renal: [ ] Thirst [ ] Edema  GI: [ ] Nausea [ ] Emesis [ ] Abdominal pain [ ] Constipation [ ] Diarrhea  Hem: [ ] Hematemesis [ ] bright red blood per rectum  ID: [ ] Fever [ ] Chills [ ] Dysuria  ENT: [ ] Rhinorrhea    PHYSICAL EXAM:    General: No Acute Distress,  Neurological: eyes open to voice, follows commands, pupils 2mm reactive, b/l UE 5/5 b/l LE 5/5   Pulmonary: Clear to Auscultation, No Rales, No Rhonchi, No Wheezes   Cardiovascular: S1, S2, Regular Rate and Rhythm   Gastrointestinal: Soft, Nontender, Nondistended   Extremities: No calf tenderness   Incision: R groin w/o hematoma    ICU Vital Signs Last 24 Hrs  T(C): 36.7 (05 Mar 2023 05:23), Max: 37.1 (05 Mar 2023 01:25)  T(F): 98 (05 Mar 2023 05:23), Max: 98.8 (05 Mar 2023 01:25)  HR: 61 (05 Mar 2023 08:00) (53 - 77)  BP: 110/58 (05 Mar 2023 08:00) (91/53 - 113/70)  BP(mean): 78 (05 Mar 2023 08:00) (65 - 85)  ABP: 115/100 (04 Mar 2023 10:00) (115/100 - 115/100)  ABP(mean): 105 (04 Mar 2023 10:00) (105 - 105)  RR: 12 (05 Mar 2023 08:00) (12 - 29)  SpO2: 97% (05 Mar 2023 08:00) (94% - 98%)      03-04-23 @ 07:01  -  03-05-23 @ 07:00  --------------------------------------------------------  IN: 2980 mL / OUT: 2950 mL / NET: 30 mL    acetaminophen     Tablet .. 650 milliGRAM(s) Oral every 6 hours PRN  bisacodyl 5 milliGRAM(s) Oral every 12 hours PRN  ceFAZolin   IVPB 2000 milliGRAM(s) IV Intermittent every 8 hours  chlorhexidine 2% Cloths 1 Application(s) Topical <User Schedule>  enoxaparin Injectable 40 milliGRAM(s) SubCutaneous <User Schedule>  hydrALAZINE Injectable 10 milliGRAM(s) IV Push every 4 hours PRN  influenza   Vaccine 0.5 milliLiter(s) IntraMuscular once  levETIRAcetam 1000 milliGRAM(s) Oral two times a day  niMODipine 30 milliGRAM(s) Oral every 2 hours  ondansetron Injectable 4 milliGRAM(s) IV Push every 6 hours PRN  polyethylene glycol 3350 17 Gram(s) Oral daily  senna 2 Tablet(s) Oral at bedtime  traMADol 100 milliGRAM(s) Oral every 6 hours PRN  traMADol 50 milliGRAM(s) Oral every 6 hours PRN      LABS:  Na: 138 (03-05 @ 05:50), 141 (03-04 @ 05:36), 139 (03-03 @ 15:50), 135 (03-03 @ 05:46)  K: 4.0 (03-05 @ 05:50), 3.4 (03-04 @ 05:36), 4.2 (03-03 @ 15:50), 4.2 (03-03 @ 05:46)  Cl: 103 (03-05 @ 05:50), 112 (03-04 @ 05:36), 106 (03-03 @ 15:50), 104 (03-03 @ 05:46)  CO2: 27 (03-05 @ 05:50), 23 (03-04 @ 05:36), 24 (03-03 @ 15:50), 24 (03-03 @ 05:46)  BUN: 13 (03-05 @ 05:50), 10 (03-04 @ 05:36), 11 (03-03 @ 15:50), 11 (03-03 @ 05:46)  Cr: 0.77 (03-05 @ 05:50), 0.62 (03-04 @ 05:36), 0.70 (03-03 @ 15:50), 0.73 (03-03 @ 05:46)  Glu: 103(03-05 @ 05:50), 101(03-04 @ 05:36), 108(03-03 @ 15:50), 122(03-03 @ 05:46)    Hgb: 11.4 (03-05 @ 05:50), 10.2 (03-04 @ 05:36), 10.7 (03-03 @ 15:50), 11.4 (03-03 @ 05:46)  Hct: 33.8 (03-05 @ 05:50), 30.1 (03-04 @ 05:36), 31.8 (03-03 @ 15:50), 33.6 (03-03 @ 05:46)  WBC: 12.08 (03-05 @ 05:50), 11.43 (03-04 @ 05:36), 15.70 (03-03 @ 15:50), 17.65 (03-03 @ 05:46)  Plt: 213 (03-05 @ 05:50), 129 (03-04 @ 05:36), 186 (03-03 @ 15:50), 152 (03-03 @ 05:46)    INR: 1.18 03-03-23 @ 16:50, 1.27 03-03-23 @ 05:46  PTT: 16.6 03-03-23 @ 16:50, 18.2 03-03-23 @ 05:46      RADIOLOGY & ADDITIONAL STUDIES:  < from: CT Angio Neck w/ IV Cont (02.28.23 @ 01:15) >  Intracranial CTA: 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    1 mm superiorly directed outpouching of the paraclinoid right ICA which   may represent origin of the ophthalmic artery versus small aneurysm..    Extracranial CTA: No steno-occlusive disease.    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 Patient reports that this morning he woke up with a severe headache that was accompanied by an episode of nausea and vomiting. He also endorses that the headache was so severe he became extremely anxious, felt as if he was having a panic attack and became diaphoretic. He states that headaches are controlled from the pain medicine he received while at [place] and did not have any additional episodes of nausea/vomiting. Pending further workup with repeat CTH/CTA and possible cerebral angiogram     Denies visual changes, dizziness, cigarette smoking, family hx of brain aneurysm, previous recurrent headaches, changes in sensation, mental status, unilateral weakness, confusion. (27 Feb 2023 23:14)    PAST MEDICAL & SURGICAL HISTORY:  Anxiety  No significant past surgical history    REVIEW OF SYSTEMS: [ ] Unable to Assess due to neurologic exam   [x] All ROS addressed below are non-contributory, except:  Neuro: [ ] Headache [ ] Back pain [ ] Numbness [ ] Weakness [ ] Ataxia [ ] Dizziness [ ] Aphasia [ ] Dysarthria [ ] Visual disturbance  Resp: [ ] Shortness of breath/dyspnea, [ ] Orthopnea [ ] Cough  CV: [ ] Chest pain [ ] Palpitation [ ] Lightheadedness [ ] Syncope  Renal: [ ] Thirst [ ] Edema  GI: [ ] Nausea [ ] Emesis [ ] Abdominal pain [ ] Constipation [ ] Diarrhea  Hem: [ ] Hematemesis [ ] bright red blood per rectum  ID: [ ] Fever [ ] Chills [ ] Dysuria  ENT: [ ] Rhinorrhea    PHYSICAL EXAM:    General: No Acute Distress,  Neurological: left eye esotropia, eyes open to voice, follows commands, pupils 2mm reactive, b/l UE 5/5 b/l LE 5/5   Pulmonary: Clear to Auscultation, No Rales, No Rhonchi, No Wheezes   Cardiovascular: S1, S2, Regular Rate and Rhythm   Gastrointestinal: Soft, Nontender, Nondistended   Extremities: No calf tenderness   Incision: R groin w/o hematoma    ICU Vital Signs Last 24 Hrs  T(C): 36.7 (05 Mar 2023 05:23), Max: 37.1 (05 Mar 2023 01:25)  T(F): 98 (05 Mar 2023 05:23), Max: 98.8 (05 Mar 2023 01:25)  HR: 61 (05 Mar 2023 08:00) (53 - 77)  BP: 110/58 (05 Mar 2023 08:00) (91/53 - 113/70)  BP(mean): 78 (05 Mar 2023 08:00) (65 - 85)  ABP: 115/100 (04 Mar 2023 10:00) (115/100 - 115/100)  ABP(mean): 105 (04 Mar 2023 10:00) (105 - 105)  RR: 12 (05 Mar 2023 08:00) (12 - 29)  SpO2: 97% (05 Mar 2023 08:00) (94% - 98%)      03-04-23 @ 07:01  -  03-05-23 @ 07:00  --------------------------------------------------------  IN: 2980 mL / OUT: 2950 mL / NET: 30 mL    acetaminophen     Tablet .. 650 milliGRAM(s) Oral every 6 hours PRN  bisacodyl 5 milliGRAM(s) Oral every 12 hours PRN  ceFAZolin   IVPB 2000 milliGRAM(s) IV Intermittent every 8 hours  chlorhexidine 2% Cloths 1 Application(s) Topical <User Schedule>  enoxaparin Injectable 40 milliGRAM(s) SubCutaneous <User Schedule>  hydrALAZINE Injectable 10 milliGRAM(s) IV Push every 4 hours PRN  influenza   Vaccine 0.5 milliLiter(s) IntraMuscular once  levETIRAcetam 1000 milliGRAM(s) Oral two times a day  niMODipine 30 milliGRAM(s) Oral every 2 hours  ondansetron Injectable 4 milliGRAM(s) IV Push every 6 hours PRN  polyethylene glycol 3350 17 Gram(s) Oral daily  senna 2 Tablet(s) Oral at bedtime  traMADol 100 milliGRAM(s) Oral every 6 hours PRN  traMADol 50 milliGRAM(s) Oral every 6 hours PRN      LABS:  Na: 138 (03-05 @ 05:50), 141 (03-04 @ 05:36), 139 (03-03 @ 15:50), 135 (03-03 @ 05:46)  K: 4.0 (03-05 @ 05:50), 3.4 (03-04 @ 05:36), 4.2 (03-03 @ 15:50), 4.2 (03-03 @ 05:46)  Cl: 103 (03-05 @ 05:50), 112 (03-04 @ 05:36), 106 (03-03 @ 15:50), 104 (03-03 @ 05:46)  CO2: 27 (03-05 @ 05:50), 23 (03-04 @ 05:36), 24 (03-03 @ 15:50), 24 (03-03 @ 05:46)  BUN: 13 (03-05 @ 05:50), 10 (03-04 @ 05:36), 11 (03-03 @ 15:50), 11 (03-03 @ 05:46)  Cr: 0.77 (03-05 @ 05:50), 0.62 (03-04 @ 05:36), 0.70 (03-03 @ 15:50), 0.73 (03-03 @ 05:46)  Glu: 103(03-05 @ 05:50), 101(03-04 @ 05:36), 108(03-03 @ 15:50), 122(03-03 @ 05:46)    Hgb: 11.4 (03-05 @ 05:50), 10.2 (03-04 @ 05:36), 10.7 (03-03 @ 15:50), 11.4 (03-03 @ 05:46)  Hct: 33.8 (03-05 @ 05:50), 30.1 (03-04 @ 05:36), 31.8 (03-03 @ 15:50), 33.6 (03-03 @ 05:46)  WBC: 12.08 (03-05 @ 05:50), 11.43 (03-04 @ 05:36), 15.70 (03-03 @ 15:50), 17.65 (03-03 @ 05:46)  Plt: 213 (03-05 @ 05:50), 129 (03-04 @ 05:36), 186 (03-03 @ 15:50), 152 (03-03 @ 05:46)    INR: 1.18 03-03-23 @ 16:50, 1.27 03-03-23 @ 05:46  PTT: 16.6 03-03-23 @ 16:50, 18.2 03-03-23 @ 05:46      RADIOLOGY & ADDITIONAL STUDIES:  < from: CT Angio Neck w/ IV Cont (02.28.23 @ 01:15) >  Intracranial CTA: 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    1 mm superiorly directed outpouching of the paraclinoid right ICA which   may represent origin of the ophthalmic artery versus small aneurysm..    Extracranial CTA: No steno-occlusive disease.    < end of copied text >

## 2023-03-05 NOTE — OCCUPATIONAL THERAPY INITIAL EVALUATION ADULT - PERTINENT HX OF CURRENT PROBLEM, REHAB EVAL
2/28/23 traumatic LP and s/p cerebral angiogram finding R Pcomm aneurysm.  3/1/23 R pteronial crani for R Pcomm clipping w ICG angio, intraop aneurysm rupture  3/3/23 angio showing complete ligation and no residual aneurysm, potential R P1 vasospasm.

## 2023-03-06 LAB
ANION GAP SERPL CALC-SCNC: 9 MMOL/L — SIGNIFICANT CHANGE UP (ref 5–17)
BUN SERPL-MCNC: 13 MG/DL — SIGNIFICANT CHANGE UP (ref 7–23)
CALCIUM SERPL-MCNC: 9 MG/DL — SIGNIFICANT CHANGE UP (ref 8.4–10.5)
CHLORIDE SERPL-SCNC: 101 MMOL/L — SIGNIFICANT CHANGE UP (ref 96–108)
CO2 SERPL-SCNC: 28 MMOL/L — SIGNIFICANT CHANGE UP (ref 22–31)
CREAT SERPL-MCNC: 0.8 MG/DL — SIGNIFICANT CHANGE UP (ref 0.5–1.3)
EGFR: 121 ML/MIN/1.73M2 — SIGNIFICANT CHANGE UP
GLUCOSE SERPL-MCNC: 113 MG/DL — HIGH (ref 70–99)
HCT VFR BLD CALC: 34.9 % — LOW (ref 39–50)
HGB BLD-MCNC: 12 G/DL — LOW (ref 13–17)
MAGNESIUM SERPL-MCNC: 1.6 MG/DL — SIGNIFICANT CHANGE UP (ref 1.6–2.6)
MCHC RBC-ENTMCNC: 31.8 PG — SIGNIFICANT CHANGE UP (ref 27–34)
MCHC RBC-ENTMCNC: 34.4 GM/DL — SIGNIFICANT CHANGE UP (ref 32–36)
MCV RBC AUTO: 92.6 FL — SIGNIFICANT CHANGE UP (ref 80–100)
NRBC # BLD: 0 /100 WBCS — SIGNIFICANT CHANGE UP (ref 0–0)
PHOSPHATE SERPL-MCNC: 3.4 MG/DL — SIGNIFICANT CHANGE UP (ref 2.5–4.5)
PLATELET # BLD AUTO: 233 K/UL — SIGNIFICANT CHANGE UP (ref 150–400)
POTASSIUM SERPL-MCNC: 3.9 MMOL/L — SIGNIFICANT CHANGE UP (ref 3.5–5.3)
POTASSIUM SERPL-SCNC: 3.9 MMOL/L — SIGNIFICANT CHANGE UP (ref 3.5–5.3)
RBC # BLD: 3.77 M/UL — LOW (ref 4.2–5.8)
RBC # FLD: 13.8 % — SIGNIFICANT CHANGE UP (ref 10.3–14.5)
SODIUM SERPL-SCNC: 138 MMOL/L — SIGNIFICANT CHANGE UP (ref 135–145)
WBC # BLD: 10.91 K/UL — HIGH (ref 3.8–10.5)
WBC # FLD AUTO: 10.91 K/UL — HIGH (ref 3.8–10.5)

## 2023-03-06 PROCEDURE — 99291 CRITICAL CARE FIRST HOUR: CPT

## 2023-03-06 PROCEDURE — 93888 INTRACRANIAL LIMITED STUDY: CPT | Mod: 26

## 2023-03-06 PROCEDURE — 99024 POSTOP FOLLOW-UP VISIT: CPT

## 2023-03-06 RX ORDER — MAGNESIUM SULFATE 500 MG/ML
4 VIAL (ML) INJECTION ONCE
Refills: 0 | Status: COMPLETED | OUTPATIENT
Start: 2023-03-06 | End: 2023-03-06

## 2023-03-06 RX ORDER — DEXAMETHASONE 0.5 MG/5ML
3 ELIXIR ORAL EVERY 8 HOURS
Refills: 0 | Status: COMPLETED | OUTPATIENT
Start: 2023-03-08 | End: 2023-03-08

## 2023-03-06 RX ORDER — SODIUM CHLORIDE 9 MG/ML
500 INJECTION, SOLUTION INTRAVENOUS ONCE
Refills: 0 | Status: COMPLETED | OUTPATIENT
Start: 2023-03-06 | End: 2023-03-06

## 2023-03-06 RX ORDER — TRAMADOL HYDROCHLORIDE 50 MG/1
100 TABLET ORAL EVERY 6 HOURS
Refills: 0 | Status: DISCONTINUED | OUTPATIENT
Start: 2023-03-06 | End: 2023-03-07

## 2023-03-06 RX ORDER — POTASSIUM CHLORIDE 20 MEQ
10 PACKET (EA) ORAL ONCE
Refills: 0 | Status: COMPLETED | OUTPATIENT
Start: 2023-03-06 | End: 2023-03-06

## 2023-03-06 RX ORDER — DEXAMETHASONE 0.5 MG/5ML
ELIXIR ORAL
Refills: 0 | Status: COMPLETED | OUTPATIENT
Start: 2023-03-06 | End: 2023-03-10

## 2023-03-06 RX ORDER — DEXAMETHASONE 0.5 MG/5ML
4 ELIXIR ORAL EVERY 6 HOURS
Refills: 0 | Status: COMPLETED | OUTPATIENT
Start: 2023-03-06 | End: 2023-03-07

## 2023-03-06 RX ORDER — DEXAMETHASONE 0.5 MG/5ML
2 ELIXIR ORAL EVERY 8 HOURS
Refills: 0 | Status: COMPLETED | OUTPATIENT
Start: 2023-03-09 | End: 2023-03-09

## 2023-03-06 RX ORDER — HYDROMORPHONE HYDROCHLORIDE 2 MG/ML
0.5 INJECTION INTRAMUSCULAR; INTRAVENOUS; SUBCUTANEOUS ONCE
Refills: 0 | Status: DISCONTINUED | OUTPATIENT
Start: 2023-03-06 | End: 2023-03-06

## 2023-03-06 RX ORDER — MAGNESIUM SULFATE 500 MG/ML
2 VIAL (ML) INJECTION ONCE
Refills: 0 | Status: COMPLETED | OUTPATIENT
Start: 2023-03-06 | End: 2023-03-06

## 2023-03-06 RX ORDER — SODIUM CHLORIDE 9 MG/ML
1000 INJECTION INTRAMUSCULAR; INTRAVENOUS; SUBCUTANEOUS
Refills: 0 | Status: DISCONTINUED | OUTPATIENT
Start: 2023-03-06 | End: 2023-03-07

## 2023-03-06 RX ADMIN — TRAMADOL HYDROCHLORIDE 50 MILLIGRAM(S): 50 TABLET ORAL at 20:01

## 2023-03-06 RX ADMIN — ONDANSETRON 4 MILLIGRAM(S): 8 TABLET, FILM COATED ORAL at 22:09

## 2023-03-06 RX ADMIN — TRAMADOL HYDROCHLORIDE 50 MILLIGRAM(S): 50 TABLET ORAL at 10:42

## 2023-03-06 RX ADMIN — TRAMADOL HYDROCHLORIDE 50 MILLIGRAM(S): 50 TABLET ORAL at 10:03

## 2023-03-06 RX ADMIN — Medication 25 GRAM(S): at 22:09

## 2023-03-06 RX ADMIN — HYDROMORPHONE HYDROCHLORIDE 0.5 MILLIGRAM(S): 2 INJECTION INTRAMUSCULAR; INTRAVENOUS; SUBCUTANEOUS at 01:50

## 2023-03-06 RX ADMIN — Medication 100 MILLIGRAM(S): at 00:45

## 2023-03-06 RX ADMIN — Medication 25 GRAM(S): at 07:55

## 2023-03-06 RX ADMIN — TRAMADOL HYDROCHLORIDE 100 MILLIGRAM(S): 50 TABLET ORAL at 13:27

## 2023-03-06 RX ADMIN — Medication 4 MILLIGRAM(S): at 22:08

## 2023-03-06 RX ADMIN — Medication 1 CAPSULE(S): at 22:26

## 2023-03-06 RX ADMIN — Medication 10 MILLIEQUIVALENT(S): at 07:55

## 2023-03-06 RX ADMIN — TRAMADOL HYDROCHLORIDE 100 MILLIGRAM(S): 50 TABLET ORAL at 15:25

## 2023-03-06 RX ADMIN — BRIVARACETAM 50 MILLIGRAM(S): 25 TABLET, FILM COATED ORAL at 06:45

## 2023-03-06 RX ADMIN — SODIUM CHLORIDE 100 MILLILITER(S): 9 INJECTION INTRAMUSCULAR; INTRAVENOUS; SUBCUTANEOUS at 21:22

## 2023-03-06 RX ADMIN — TRAMADOL HYDROCHLORIDE 100 MILLIGRAM(S): 50 TABLET ORAL at 06:46

## 2023-03-06 RX ADMIN — SODIUM CHLORIDE 1000 MILLILITER(S): 9 INJECTION, SOLUTION INTRAVENOUS at 11:44

## 2023-03-06 RX ADMIN — Medication 1 CAPSULE(S): at 23:12

## 2023-03-06 RX ADMIN — TRAMADOL HYDROCHLORIDE 100 MILLIGRAM(S): 50 TABLET ORAL at 22:01

## 2023-03-06 RX ADMIN — ENOXAPARIN SODIUM 40 MILLIGRAM(S): 100 INJECTION SUBCUTANEOUS at 21:22

## 2023-03-06 RX ADMIN — TRAMADOL HYDROCHLORIDE 50 MILLIGRAM(S): 50 TABLET ORAL at 17:17

## 2023-03-06 RX ADMIN — TRAMADOL HYDROCHLORIDE 100 MILLIGRAM(S): 50 TABLET ORAL at 21:22

## 2023-03-06 RX ADMIN — TRAMADOL HYDROCHLORIDE 100 MILLIGRAM(S): 50 TABLET ORAL at 07:15

## 2023-03-06 RX ADMIN — BRIVARACETAM 50 MILLIGRAM(S): 25 TABLET, FILM COATED ORAL at 20:06

## 2023-03-06 RX ADMIN — HYDROMORPHONE HYDROCHLORIDE 0.5 MILLIGRAM(S): 2 INJECTION INTRAMUSCULAR; INTRAVENOUS; SUBCUTANEOUS at 00:45

## 2023-03-06 NOTE — PROGRESS NOTE ADULT - ASSESSMENT
Assessment: 33 y/o M found to have ruptured PCOMM s/p DSA w/o intervention now s/p crani & clipping c/b intraop re-rupture.    NEURO:  Neurochecks  Q1  Seizure ppx: briviact 50mg BID per NSGY. Consider DC  DCI management- serial TCDs, CTA 3/9 (10 days from sentinal headache).  Continue Nimodipine with hold parameters (for HR, BP) for total of 21days    Analgesia prn  Activity: Ambulate as tolerated. PT/OT     PULMONARY:  Saturating well on RA    CARDIOVASCULAR: Bradycardia episodes s/p atropine now sinus   Monitor on telemetry   Vitals Q1  SBP goal 100-180  TTE:     GASTROENTEROLOGY:   LBM 3/4  Regular diet    RENAL/:  Monitor BMPS  Strict Is/Os  Na goal 135-145    ENDOCRINE:  Blood glucose goal 140-180  A1c 5.3    HEME/ONC:  DVT ppx: SQL  Anti Xa  B/: SCDs    INFECTIOUS:   Monitor for fevers   Ancef (re: incision manipulation necessitating abx ppx) until 3/6     MISC:    SOCIAL/FAMILY:  [] awaiting [x] updated at bedside [] family meeting    CODE STATUS:  [x] Full Code [] DNR [] DNI [] Palliative/Comfort Care    DISPOSITION:  [x] ICU [] Stroke Unit [] Floor [] EMU [] RCU [] PCU    Time spent: 45 critical care minutes   Assessment: 33 y/o M found to have ruptured R PCOMM s/p DSA w/o intervention now s/p crani & clipping c/b intraop re-rupture.  BD 8    NEURO:  Neurochecks  Q1  Seizure ppx: briviact 50mg BID per NSGY. Consider DC  DCI management- serial TCDs, CTA 3/9 (10 days from sentinal headache).  Continue Nimodipine with hold parameters (for HR, BP) for total of 21days    Analgesia prn  Activity: Ambulate as tolerated. PT/OT.     PULMONARY:  Saturating well on RA    CARDIOVASCULAR: Bradycardia episodes s/p atropine- resolved. Now normal sinus rhythm  Monitor on telemetry   Vitals Q1  SBP goal 100-180  TTE: pending    GASTROENTEROLOGY:   LBM 3/4  Regular diet    RENAL/:  Monitor BMPS  Strict Is/Os; goal euvolemia  Na goal 135-145    ENDOCRINE:  Blood glucose goal 140-180  A1c 5.3    HEME/ONC:  DVT ppx: SQL  Anti Xa- 3/7 1:00am  B/L SCDs    INFECTIOUS:   Monitor for fevers   S/P  Ancef (re: incision manipulation necessitating abx ppx) until 3/6     MISC:    SOCIAL/FAMILY:  [] awaiting [x] updated at bedside [] family meeting    CODE STATUS:  [x] Full Code [] DNR [] DNI [] Palliative/Comfort Care    DISPOSITION:  [x] ICU [] Stroke Unit [] Floor [] EMU [] RCU [] PCU    Time spent: 45 critical care minutes

## 2023-03-06 NOTE — CHART NOTE - NSCHARTNOTEFT_GEN_A_CORE
BD8, POD5 crani/clipping, POD3. Given 1L LR bolus for euvolemia goal. Given 0.5mg dilaudid IV x1 for breakthrough headache, remains neuro intact. SBP goal 100-180. Pend CTH BD 10 (3/8). 500cc LR bolus given for euvolemia goal

## 2023-03-06 NOTE — PROGRESS NOTE ADULT - SUBJECTIVE AND OBJECTIVE BOX
HPI:  Patient is a 33yo M with PMHx of anxiety who originally presented to [facility] with severe headache, nausea with emesis x 1 and diaphoresis and was found to have a right 6mm ICA aneurysm on CTA. Patient reports that this morning he woke up with a severe headache that was accompanied by an episode of nausea and vomiting. He also endorses that the headache was so severe he became extremely anxious, felt as if he was having a panic attack and became diaphoretic. He states that headaches are controlled from the pain medicine he received while at [place] and did not have any additional episodes of nausea/vomiting. Pending further workup with repeat CTH/CTA and possible cerebral angiogram     Denies visual changes, dizziness, cigarette smoking, family hx of brain aneurysm, previous recurrent headaches, changes in sensation, mental status, unilateral weakness, confusion.      S/Overnight events: Given 1L LR bolus for euvolemia goal. Given 0.5mg dilaudid IV x1 for breakthrough headache, remains neuro intact.      Hospital Course:   2/28: Admitted for further workup, CTH/CTA obtained, LP done, r/o xanthochromia, traumatic LP resulting in blood in all tubes. POD0 s/p cerebral angiogram with findings of 7.8mm R Pcomm aneurysm. Plan for OR in the morning for clipping of aneurysm.   3/1: NICK o/n neuro stable. POD0 R crani for R pcomm aneurysm clipping c/b intraoperative aneurysm rupture, EBL 1L. post op CTH stable. remains intubated post op sedated on propofol/precedex. on thanh gtt. hypotensive 80s, salma 30s, given 1L NS and 1mg atropine. sedation held, thanh changed to levo gtt. restarted on propofol for sedation  3/2: BD4. RBD2. POD1. neuro stable. Propofol overnight while intubated. CTH stable post-op, CTA head and neck shows clipping of R pcomm aneurysm, lack of visualization of R pcomm and R P1 segment possibly secondary to vasospasm, will correlate clinically. Pending formal angiogram tomorrow. Extubated this am. Voiding.   3/3: BD5, RBD2. POD2. NICK o/n neuro stable. 500cc bolus NS given in AM for euvolemia. POD0 angiogram showing complete clip ligation of R pcomm aneurym, no residual. Liberalized SBP goal to 120-180, given 500cc NS bolus.   3/4: BD 6, POD 3 crani for clipping and POD 1 angio. NICK o/n. 1.5L NS bolus for euvolemia. ALONA with a lot of resistance during removal attempt, fellow at bedside, removed staples to open up incision and visualize the drain, drain was removed and 4 horizontal mattress sutures were placed. Started on ancef x  48 hours, given albumin for volume status. Given lactulose and had bowel movement. SQL started this evening  3/5: BD7, POD 4 crani/clipping, POD2 dx angio. NICK overnight. Pain well controlled, no complaints at this time. Headaches controlled. SBP goal 100-180, o/n SBP dipped to 90s, asymptomatic, neuro exam remains intact, nimodipine if BP and/or HR allows. Platelets down in evening but normalized with AM labs. Pending CTA BD10. Anxiety issues overnight, keppra switched to briviact. Had episode of nausea and diaphoresis while working with OT, vital signs stable throughout, given zofran. Improved with resting and lying down.   3/6: BD8, POD5 crani/clipping, POD3. Given 1L LR bolus for euvolemia goal. Given 0.5mg dilaudid IV x1 for breakthrough headache, remains neuro intact. SBP goal 100-180. Pend CTH BD 10 (3/8).       Vital Signs Last 24 Hrs  T(C): 36.6 (05 Mar 2023 22:04), Max: 37.1 (05 Mar 2023 01:25)  T(F): 97.9 (05 Mar 2023 22:04), Max: 98.8 (05 Mar 2023 01:25)  HR: 59 (06 Mar 2023 00:00) (50 - 71)  BP: 102/55 (06 Mar 2023 00:00) (88/53 - 115/64)  BP(mean): 74 (06 Mar 2023 00:00) (66 - 82)  RR: 16 (06 Mar 2023 00:00) (12 - 25)  SpO2: 96% (06 Mar 2023 00:00) (93% - 98%)    Parameters below as of 06 Mar 2023 00:00  Patient On (Oxygen Delivery Method): room air        I&O's Detail    04 Mar 2023 07:01  -  05 Mar 2023 07:00  --------------------------------------------------------  IN:    IV PiggyBack: 300 mL    Lactated Ringers Bolus: 500 mL    Oral Fluid: 1680 mL    Sodium Chloride 0.9% Bolus: 500 mL  Total IN: 2980 mL    OUT:    Voided (mL): 2950 mL  Total OUT: 2950 mL    Total NET: 30 mL      05 Mar 2023 07:01  -  06 Mar 2023 00:40  --------------------------------------------------------  IN:    Lactated Ringers Bolus: 1000 mL    Oral Fluid: 100 mL  Total IN: 1100 mL    OUT:    Voided (mL): 725 mL  Total OUT: 725 mL    Total NET: 375 mL        I&O's Summary    04 Mar 2023 07:01  -  05 Mar 2023 07:00  --------------------------------------------------------  IN: 2980 mL / OUT: 2950 mL / NET: 30 mL    05 Mar 2023 07:01  -  06 Mar 2023 00:40  --------------------------------------------------------  IN: 1100 mL / OUT: 725 mL / NET: 375 mL        PHYSICAL EXAM:  General: NAD, pt is comfortably sitting up in bed, A&O x3, on RA  HEENT: CN II-XII grossly intact, PERRL 3mm, EOMI b/l, face symmetric, tongue midline, neck FROM  Cardiovascular: RRR, normal S1 and S2   Respiratory: lungs CTAB, no wheezing, rhonchi, or crackles   GI: normoactive BS to auscultation, abd soft, NTND   Neuro: no aphasia, speech clear, no dysmetria, no pronator drift  strength 5/5 throughout all 4 extremities  sensation intact to light touch throughout   Extremities: distal pulses 2+ x4   Wound/incision: staple and sutures in-place along R crani     TUBES/LINES:  [] CVC  [] A-line  [] Lumbar Drain  [] Ventriculostomy  [] Other    DIET:  [] NPO  [X] Mechanical  [] Tube feeds      LABS:      CAPILLARY BLOOD GLUCOSE      Drug Levels: [] N/A    CSF Analysis: [] N/A      Allergies    No Known Allergies    Intolerances      MEDICATIONS:  Antibiotics:  ceFAZolin   IVPB 2000 milliGRAM(s) IV Intermittent every 8 hours    Neuro:  acetaminophen     Tablet .. 650 milliGRAM(s) Oral every 6 hours PRN  brivaracetam 50 milliGRAM(s) Oral every 12 hours  HYDROmorphone  Injectable 0.5 milliGRAM(s) IV Push once PRN  ondansetron Injectable 4 milliGRAM(s) IV Push every 6 hours PRN  traMADol 100 milliGRAM(s) Oral every 6 hours PRN  traMADol 50 milliGRAM(s) Oral every 6 hours PRN    Anticoagulation:  enoxaparin Injectable 40 milliGRAM(s) SubCutaneous <User Schedule>    OTHER:  bisacodyl 5 milliGRAM(s) Oral every 12 hours PRN  chlorhexidine 2% Cloths 1 Application(s) Topical <User Schedule>  influenza   Vaccine 0.5 milliLiter(s) IntraMuscular once  niMODipine 30 milliGRAM(s) Oral every 2 hours  polyethylene glycol 3350 17 Gram(s) Oral daily  senna 2 Tablet(s) Oral at bedtime    IVF:    CULTURES:  Culture Results:   No growth to date (02-28 @ 05:49)    RADIOLOGY & ADDITIONAL TESTS:      ASSESSMENT:  Patient is a 33yo M with PMHx of anxiety who originally presented to [facility] with severe headache, nausea with emesis x 1 and diaphoresis and was found to have a right 6mm ICA aneurysm on CTA, admitted, now s/p lumbar puncture 2/28 and s/p cerebral angiogram with findings of 7.8 mm R Pcomm aneurysm 2/28. now s/p R pteronial crani for R PCOMM clipping w/ ICG angio, intraoperative aneurysm rupture (3/1). s/p angio showing complete ligation and no residual aneurysm (3/3).      ANEURYSM    No pertinent family history in first degree relatives    Handoff    MEWS Score    Anxiety    Cerebral aneurysm    Cerebral aneurysm    Unruptured cerebral aneurysm    Anxiety    Pre-op evaluation    Leukocytosis    Lumbar puncture    Angiogram, carotid and cerebral, bilateral    Craniotomy, for aneurysm repair    No significant past surgical history    Room Service Assist    [email or web address]        PLAN:  NEURO  - Neuro/vitals q1 hours   - Pain control - tramadol PRN  - Briviact 50mg BID   - Decadron 3 day taper completed   - CTA 3/2: possible PCOMM vasospasm, CTA BD10 3/8   - Angio 3/3 negative for spasm, complete obliteration of aneurysm  - Nimodipine 30mg q 2hrs until BD21 (hold parameters for hypotension and bradycardia)     PULM  - Extubated 3/2, RA  - IS     CARDIO  - -180  - Echo pending     GI  - reg diet  - Bowel regimen   - Last BM 3/4  - Zofran prn nausea     ENDO  - ISS dc'd, A1c 5.3     RENAL  - IVL  - strict is and o's  - Voiding   - Euvolemia goal, LR or plasmalyte boluses PRN   - s/p albumin 250cc 3/4     HEME  - SCDs, SQL     ID  - Afebrile  - ancef x 48 hours for incision end date 3/6     Dispo:   - ICU status, full code, dispo pending   - PT/OT home no needs when medically ready     Assessment and plan discussed with Dr. Wiseman and Dr. Kelly

## 2023-03-06 NOTE — PROGRESS NOTE ADULT - SUBJECTIVE AND OBJECTIVE BOX
INTERVAL HISTORY: HPI:  Patient is a 33yo M with PMHx of anxiety who originally presented to [facility] with severe headache, nausea with emesis x 1 and diaphoresis and was found to have a right 6mm ICA aneurysm on CTA. Patient reports that this morning he woke up with a severe headache that was accompanied by an episode of nausea and vomiting. He also endorses that the headache was so severe he became extremely anxious, felt as if he was having a panic attack and became diaphoretic. He states that headaches are controlled from the pain medicine he received while at [place] and did not have any additional episodes of nausea/vomiting. Pending further workup with repeat CTH/CTA and possible cerebral angiogram     Denies visual changes, dizziness, cigarette smoking, family hx of brain aneurysm, previous recurrent headaches, changes in sensation, mental status, unilateral weakness, confusion. (27 Feb 2023 23:14)    PAST MEDICAL & SURGICAL HISTORY:  Anxiety  No significant past surgical history    REVIEW OF SYSTEMS: [ ] Unable to Assess due to neurologic exam   [x] All ROS addressed below are non-contributory, except:  Neuro: [ ] Headache [ ] Back pain [ ] Numbness [ ] Weakness [ ] Ataxia [ ] Dizziness [ ] Aphasia [ ] Dysarthria [ ] Visual disturbance  Resp: [ ] Shortness of breath/dyspnea, [ ] Orthopnea [ ] Cough  CV: [ ] Chest pain [ ] Palpitation [ ] Lightheadedness [ ] Syncope  Renal: [ ] Thirst [ ] Edema  GI: [ ] Nausea [ ] Emesis [ ] Abdominal pain [ ] Constipation [ ] Diarrhea  Hem: [ ] Hematemesis [ ] bright red blood per rectum  ID: [ ] Fever [ ] Chills [ ] Dysuria  ENT: [ ] Rhinorrhea    ICU Vital Signs Last 24 Hrs  T(C): 37 (06 Mar 2023 05:41), Max: 37 (06 Mar 2023 05:41)  T(F): 98.6 (06 Mar 2023 05:41), Max: 98.6 (06 Mar 2023 05:41)  HR: 66 (06 Mar 2023 05:00) (50 - 71)  BP: 102/55 (06 Mar 2023 05:00) (88/53 - 115/64)  BP(mean): 72 (06 Mar 2023 05:00) (67 - 84)  RR: 16 (06 Mar 2023 05:00) (12 - 25)  SpO2: 94% (06 Mar 2023 05:00) (93% - 97%)      03-05-23 @ 07:01  -  03-06-23 @ 07:00  --------------------------------------------------------  IN: 1150 mL / OUT: 1500 mL / NET: -350 mL      PHYSICAL EXAM:  General: No Acute Distress,  Neurological: Eyes open to voice, follows commands, pupils 2mm reactive, b/l UE 5/5 b/l LE 5/5   Pulmonary: Clear to Auscultation, No Rales, No Rhonchi, No Wheezes   Cardiovascular: S1, S2, Regular Rate and Rhythm   Gastrointestinal: Soft, Nontender, Nondistended   Extremities: No calf tenderness   Incision: R groin w/o hematoma    MEDICATIONS:   acetaminophen     Tablet .. 650 milliGRAM(s) Oral every 6 hours PRN  bisacodyl 5 milliGRAM(s) Oral every 12 hours PRN  brivaracetam 50 milliGRAM(s) Oral every 12 hours  chlorhexidine 2% Cloths 1 Application(s) Topical <User Schedule>  enoxaparin Injectable 40 milliGRAM(s) SubCutaneous <User Schedule>  influenza   Vaccine 0.5 milliLiter(s) IntraMuscular once  magnesium sulfate  IVPB 4 Gram(s) IV Intermittent once  niMODipine 30 milliGRAM(s) Oral every 2 hours  ondansetron Injectable 4 milliGRAM(s) IV Push every 6 hours PRN  polyethylene glycol 3350 17 Gram(s) Oral daily  potassium chloride    Tablet ER 10 milliEquivalent(s) Oral once  senna 2 Tablet(s) Oral at bedtime  traMADol 100 milliGRAM(s) Oral every 6 hours PRN  traMADol 50 milliGRAM(s) Oral every 6 hours PRN      LABS:                    12.0   10.91 )-----------( 233      ( 06 Mar 2023 05:30 )             34.9     03-06    138  |  101  |  13  ----------------------------<  113<H>  3.9   |  28  |  0.80    Ca    9.0      06 Mar 2023 05:30  Phos  3.4     03-06  Mg     1.6     03-06        RADIOLOGY & ADDITIONAL STUDIES:  < from: CT Angio Neck w/ IV Cont (02.28.23 @ 01:15) >  Intracranial CTA: 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    1 mm superiorly directed outpouching of the paraclinoid right ICA which   may represent origin of the ophthalmic artery versus small aneurysm..    Extracranial CTA: No steno-occlusive disease.    < end of copied text >     INTERVAL HISTORY: HPI:  Patient is a 31yo M with PMHx of anxiety who originally presented to [facility] with severe headache, nausea with emesis x 1 and diaphoresis and was found to have a right 6mm ICA aneurysm on CTA. Patient reports that this morning he woke up with a severe headache that was accompanied by an episode of nausea and vomiting. He also endorses that the headache was so severe he became extremely anxious, felt as if he was having a panic attack and became diaphoretic. He states that headaches are controlled from the pain medicine he received while at [place] and did not have any additional episodes of nausea/vomiting. Pending further workup with repeat CTH/CTA and possible cerebral angiogram     Denies visual changes, dizziness, cigarette smoking, family hx of brain aneurysm, previous recurrent headaches, changes in sensation, mental status, unilateral weakness, confusion. (27 Feb 2023 23:14)    PAST MEDICAL & SURGICAL HISTORY:  Anxiety  No significant past surgical history    REVIEW OF SYSTEMS: [ ] Unable to Assess due to neurologic exam   [x] All ROS addressed below are non-contributory, except:  Neuro: [ ] Headache [ ] Back pain [ ] Numbness [ ] Weakness [ ] Ataxia [ ] Dizziness [ ] Aphasia [ ] Dysarthria [ ] Visual disturbance  Resp: [ ] Shortness of breath/dyspnea, [ ] Orthopnea [ ] Cough  CV: [ ] Chest pain [ ] Palpitation [ ] Lightheadedness [ ] Syncope  Renal: [ ] Thirst [ ] Edema  GI: [ ] Nausea [ ] Emesis [ ] Abdominal pain [ ] Constipation [ ] Diarrhea  Hem: [ ] Hematemesis [ ] bright red blood per rectum  ID: [ ] Fever [ ] Chills [ ] Dysuria  ENT: [ ] Rhinorrhea    ICU Vital Signs Last 24 Hrs  T(C): 37 (06 Mar 2023 05:41), Max: 37 (06 Mar 2023 05:41)  T(F): 98.6 (06 Mar 2023 05:41), Max: 98.6 (06 Mar 2023 05:41)  HR: 66 (06 Mar 2023 05:00) (50 - 71)  BP: 102/55 (06 Mar 2023 05:00) (88/53 - 115/64)  BP(mean): 72 (06 Mar 2023 05:00) (67 - 84)  RR: 16 (06 Mar 2023 05:00) (12 - 25)  SpO2: 94% (06 Mar 2023 05:00) (93% - 97%)      03-05-23 @ 07:01  -  03-06-23 @ 07:00  --------------------------------------------------------  IN: 1150 mL / OUT: 1500 mL / NET: -350 mL      PHYSICAL EXAM:  General: No Acute Distress,  Neurological: Eyes open to voice, follows commands, pupils 2mm reactive, b/l UE 5/5 b/l LE 5/5   Pulmonary: Clear to Auscultation, No Rales, No Rhonchi, No Wheezes   Cardiovascular: S1, S2, Regular Rate and Rhythm   Gastrointestinal: Soft, Nontender, Nondistended   Extremities: No calf tenderness     MEDICATIONS:   acetaminophen     Tablet .. 650 milliGRAM(s) Oral every 6 hours PRN  bisacodyl 5 milliGRAM(s) Oral every 12 hours PRN  brivaracetam 50 milliGRAM(s) Oral every 12 hours  chlorhexidine 2% Cloths 1 Application(s) Topical <User Schedule>  enoxaparin Injectable 40 milliGRAM(s) SubCutaneous <User Schedule>  influenza   Vaccine 0.5 milliLiter(s) IntraMuscular once  magnesium sulfate  IVPB 4 Gram(s) IV Intermittent once  niMODipine 30 milliGRAM(s) Oral every 2 hours  ondansetron Injectable 4 milliGRAM(s) IV Push every 6 hours PRN  polyethylene glycol 3350 17 Gram(s) Oral daily  potassium chloride    Tablet ER 10 milliEquivalent(s) Oral once  senna 2 Tablet(s) Oral at bedtime  traMADol 100 milliGRAM(s) Oral every 6 hours PRN  traMADol 50 milliGRAM(s) Oral every 6 hours PRN      LABS:                    12.0   10.91 )-----------( 233      ( 06 Mar 2023 05:30 )             34.9     03-06    138  |  101  |  13  ----------------------------<  113<H>  3.9   |  28  |  0.80    Ca    9.0      06 Mar 2023 05:30  Phos  3.4     03-06  Mg     1.6     03-06        RADIOLOGY & ADDITIONAL STUDIES:  < from: CT Angio Neck w/ IV Cont (02.28.23 @ 01:15) >  Intracranial CTA: 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    1 mm superiorly directed outpouching of the paraclinoid right ICA which   may represent origin of the ophthalmic artery versus small aneurysm..    Extracranial CTA: No steno-occlusive disease.    < end of copied text >

## 2023-03-06 NOTE — CHART NOTE - NSCHARTNOTEFT_GEN_A_CORE
"    [facility] - PODIATRY    Today's Date: 10/13/21    Patient Name: Mar Stewart  MRN: [id number]  CSN: [id number]  PCP: Milagro Wilkinson APRN  Referring Provider: No ref. provider found    SUBJECTIVE     Chief Complaint   Patient presents with   • Follow-up     pcp07/01/2021   1 MO FU BILATERAL FOOT PAIN/ BILATERAL BUNIONS- pt states left is about the same, right foot hurting a little more- pt denies pain at present- pt presents with long nails, bunnions on both feet      HPI: Mar Stewart, a 31 y.o.female, comes to clinic as a(n) established patient complaining of foot pain and complaining of bunion deformity of both feet. Patient has h/o acid reflux, calustrophobia, scoliosis. Patient presents with complaints of bunion of bilateral feet with some mild pain. Notes that she has family history of bunion deformity in grandmother and for the last year or so has noticed that her great toes are deviating and is having some pain along the bump. Notes some numbness in the 2nd toes as well. States that she bought a \"bunion kit\" with bunion shield, cushions, and toe stretchers which has helped some.  She has had updated x-rays performed.  Relates that the right foot is worse than left.  She is interested in surgery for correction of deformities. Denies pain at the present time. Relates previous treatment(s) including conservative measures. Denies any constitutional symptoms. No other pedal complaints at this time.    Past Medical History:   Diagnosis Date   • Acid reflux    • Claustrophobia    • Scoliosis      Past Surgical History:   Procedure Laterality Date   • CLAVICLE SURGERY  1999    Non-cancerous tumor removal   • SPINE SURGERY  2002    Scoliosis surgery - Dr. Faust, [facility]     Family History   Problem Relation Age of Onset   • Arthritis Mother    • Diabetes Mother    • Hypertension Mother    • Sleep apnea Mother    • Arthritis Father    • Hypertension Father    • Asthma Sister    • " Admitting Diagnosis:   Patient is a 32y old  Male who presents with a chief complaint of 6mm R ICA aneurysm (06 Mar 2023 07:19)    PAST MEDICAL & SURGICAL HISTORY:  Anxiety  No significant past surgical history    Current Nutrition Order:  Regular diet     PO Intake: Good (%) [   ]  Fair (50-75%) [ x  ] Poor (<25%) [   ]    GI Issues:   WDL, last BM 3/4  No n/v/d/c  No abd distention or discomfort noted    Pain:  7/10 head- currently on pain regimen    Skin Integrity:  Surgical incision, grupo score 18  No edema noted  No pressure ulcers noted    Labs:   03-06    138  |  101  |  13  ----------------------------<  113<H>  3.9   |  28  |  0.80    Ca    9.0      06 Mar 2023 05:30  Phos  3.4     03-06  Mg     1.6     03-06    Medications:  MEDICATIONS  (STANDING):  brivaracetam 50 milliGRAM(s) Oral every 12 hours  chlorhexidine 2% Cloths 1 Application(s) Topical <User Schedule>  enoxaparin Injectable 40 milliGRAM(s) SubCutaneous <User Schedule>  influenza   Vaccine 0.5 milliLiter(s) IntraMuscular once  lactated ringers Bolus 500 milliLiter(s) IV Bolus once  niMODipine 30 milliGRAM(s) Oral every 2 hours  polyethylene glycol 3350 17 Gram(s) Oral daily  senna 2 Tablet(s) Oral at bedtime    MEDICATIONS  (PRN):  acetaminophen     Tablet .. 650 milliGRAM(s) Oral every 6 hours PRN Temp greater or equal to 38.5C (101.3F), Mild Pain (1 - 3)  bisacodyl 5 milliGRAM(s) Oral every 12 hours PRN Constipation  ondansetron Injectable 4 milliGRAM(s) IV Push every 6 hours PRN Nausea and/or Vomiting  traMADol 100 milliGRAM(s) Oral every 6 hours PRN Severe Pain (7 - 10)  traMADol 50 milliGRAM(s) Oral every 6 hours PRN Moderate Pain (4 - 6)    Admitting Anthropometrics:  Height for BMI (FEET)	5 Feet  Height for BMI (INCHES)	10 Inch(s)  Height for BMI (CENTIMETERS)	177.8 Centimeter(s)  Weight for BMI (lbs)	209 lb  Weight for BMI (kg)	94.8 kg  Body Mass Index	29.9     Weight Change:   2/27 209lbs  3/3 209lbs  Weight has remained consistent during this admission. Please cont to trend weight biweekly.     Nutrition Focused Physical Exam: Completed [   ]  Not Pertinent [ x  ]    Estimated energy needs:   IBW used for calculations as pt >120% of IBW (125%). Needs adjusted for wound healing.   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 now s/p R pteronial crani for R PCOMM clipping w/ ICG angio, intraoperative aneurysm rupture (3/1). s/p angio showing complete ligation and no residual aneurysm (3/3).    On assessment, pt resting in bed. Currently on regular diet tolerating PO well. Pt consuming ~50% of meals. Noted appetite still decreased 2/2 pain. No n/v/d/c. No abd distention or discomfort. Last BM 3/4. Education provided on importance of adequate PO intake with emphasis on lean protein to support wound healing. Per hx; Pt noted to have a good appetite PTA. Follows a mainly regular diet without restrictions. No cultural or Amish food restrictions noted. UBW consistent with admission weight. RD to follow.     Previous Nutrition Diagnosis: Increased Nutrient Needs RT wound healing post-op AEB s/p R crani for aneurysm clipping     Active [ x  ]  Resolved [   ]    If resolved, new PES:     Goal:  Consistently meet >75% est needs    Recommendations:  1. Regular diet  2. Pain and bowel regimen per team   3. Cont to monitor lytes and replete prn   4. RD diet edu prn    Education:   Education provided on importance of adequate PO intake with emphasis on lean protein to support wound healing    Risk Level: High [   ] Moderate [ x  ] Low [   ] Diabetes Sister    • Hypertension Sister    • Sleep apnea Sister      Social History     Socioeconomic History   • Marital status: Single   Tobacco Use   • Smoking status: Never Smoker   • Smokeless tobacco: Never Used   Vaping Use   • Vaping Use: Never used   Substance and Sexual Activity   • Alcohol use: Yes     Comment: occasionally   • Drug use: No   • Sexual activity: Defer     Allergies   Allergen Reactions   • Cefprozil Hives   • Ciprofloxacin Hives     Current Outpatient Medications   Medication Sig Dispense Refill   • multivitamin with minerals (therapeutic multivitamin-minerals) tablet tablet Take 1 tablet by mouth.     • omeprazole (priLOSEC) 20 MG capsule Take 40 mg by mouth Daily.       No current facility-administered medications for this visit.     Review of Systems   Constitutional: Negative for chills and fever.   HENT: Negative for congestion.    Respiratory: Negative for shortness of breath.    Cardiovascular: Negative for chest pain and leg swelling.   Gastrointestinal: Negative for constipation, diarrhea, nausea and vomiting.   Musculoskeletal: Positive for arthralgias. Negative for myalgias.   Skin: Negative for wound.   Neurological: Negative for numbness.       OBJECTIVE     Vitals:    10/13/21 0856   BP: 120/62   Pulse: 93   SpO2: 98%       PHYSICAL EXAM  GEN:   Accompanied by none.     Foot/Ankle Exam:       General:   Appearance: appears stated age and healthy and obesity    Orientation: AAOx3    Affect: appropriate    Gait: unimpaired    Assistance: independent    Shoe Gear:  Boots    VASCULAR      Right Foot Vascularity   Dorsalis pedis:  2+  Posterior tibial:  2+  Skin Temperature: warm    Edema Grading:  None  CFT:  3  Pedal Hair Growth:  Present  Varicosities: none       Left Foot Vascularity   Dorsalis pedis:  2+  Posterior tibial:  2+  Skin Temperature: warm    Edema Grading:  None  CFT:  3  Pedal Hair Growth:  Present  Varicosities: none        NEUROLOGIC     Right Foot Neurologic    Normal sensation    Light touch sensation:  Normal  Vibratory sensation:  Normal  Hot/Cold sensation: normal    Protective Sensation using [place] Monofilament:  10     Left Foot Neurologic   Normal sensation    Light touch sensation:  Normal  Vibratory sensation:  Normal  Hot/cold sensation: normal    Protective Sensation using [place] Monofilament:  10     MUSCULOSKELETAL      Right Foot Musculoskeletal   Ecchymosis:  None  Tenderness: great toe metatarsophalangeal joint    Arch:  Normal  Hammertoe:  Second toe, third toe, fourth toe and fifth toe  Hallux valgus: Yes (Moderate medial eminence with abduction to hallux. No crepitus. Able to reduce and minimally trackbound. )    Hallux limitus: No       Left Foot Musculoskeletal   Ecchymosis:  None  Tenderness: great toe metatarsophalangeal joint    Arch:  Normal  Hammertoe:  Second toe, third toe, fourth toe and fifth toe  Hallux valgus: Yes (Moderate medial eminence with abduction to hallux. No crepitus. Able to reduce and minimally trackbound. )    Hallux limitus: No       MUSCLE STRENGTH     Right Foot Muscle Strength   Foot dorsiflexion:  5  Foot plantar flexion:  5  Foot inversion:  5  Foot eversion:  5     Left Foot Muscle Strength   Foot dorsiflexion:  5  Foot plantar flexion:  5  Foot inversion:  5  Foot eversion:  5     RANGE OF MOTION      Right Foot Range of Motion   Foot and ankle ROM within normal limits       Left Foot Range of Motion   Foot and ankle ROM within normal limits       DERMATOLOGIC     Right Foot Dermatologic   Skin: skin intact    Nails: normal       Left Foot Dermatologic   Skin: skin intact    Nails: normal        RADIOLOGY/NUCLEAR:  No results found.    LABORATORY/CULTURE RESULTS:      PATHOLOGY RESULTS:       ASSESSMENT/PLAN     Diagnoses and all orders for this visit:    1. Valgus deformity of both great toes (Primary)    2. Hammer toes of both feet    3. Metatarsus adductus of both feet    4. Foot pain,  bilateral      Comprehensive lower extremity examination and evaluation was performed.  Discussed findings and treatment plan including risks, benefits, and treatment options with patient in detail. Patient agreed with treatment plan.  Reviewed xrays with patient noting bilateral bunion deformities with metadductus and deviation of toes.    Discussed potential surgical options including lapidus, metadductus repair, hammertoe repair.  Patient is interested in surgical correction but will have to discuss further with family and her work before deciding on procedure.  Continue conservative measures at this time.    An After Visit Summary was printed and given to the patient at discharge, including (if requested) any available informative/educational handouts regarding diagnosis, treatment, or medications. All questions were answered to patient/family satisfaction. Should symptoms fail to improve or worsen they agree to call or return to clinic or to go to the Emergency Department. Discussed the importance of following up with any needed screening tests/labs/specialist appointments and any requested follow-up recommended by me today. Importance of maintaining follow-up discussed and patient accepts that missed appointments can delay diagnosis and potentially lead to worsening of conditions.  Return if symptoms worsen or fail to improve, for Follow-up with Dr. Magaña., or sooner if acute issues arise.        This document has been electronically signed by Hipolito Magaña DPM on October 13, 2021 09:38 CDT

## 2023-03-06 NOTE — PROGRESS NOTE ADULT - SUBJECTIVE AND OBJECTIVE BOX
INTERVAL HISTORY: HPI:  Patient is a 31yo M with PMHx of anxiety who originally presented to [facility] with severe headache, nausea with emesis x 1 and diaphoresis and was found to have a right 6mm ICA aneurysm on CTA. Patient reports that this morning he woke up with a severe headache that was accompanied by an episode of nausea and vomiting. He also endorses that the headache was so severe he became extremely anxious, felt as if he was having a panic attack and became diaphoretic. He states that headaches are controlled from the pain medicine he received while at [place] and did not have any additional episodes of nausea/vomiting. Pending further workup with repeat CTH/CTA and possible cerebral angiogram     Denies visual changes, dizziness, cigarette smoking, family hx of brain aneurysm, previous recurrent headaches, changes in sensation, mental status, unilateral weakness, confusion.    GCS 15, MF0,  HH 1 (27 Feb 2023 23:14)      MEDICATIONS  (STANDING):  brivaracetam 50 milliGRAM(s) Oral every 12 hours  chlorhexidine 2% Cloths 1 Application(s) Topical <User Schedule>  enoxaparin Injectable 40 milliGRAM(s) SubCutaneous <User Schedule>  influenza   Vaccine 0.5 milliLiter(s) IntraMuscular once  niMODipine 30 milliGRAM(s) Oral every 2 hours  polyethylene glycol 3350 17 Gram(s) Oral daily  senna 2 Tablet(s) Oral at bedtime  sodium chloride 0.9%. 1000 milliLiter(s) (100 mL/Hr) IV Continuous <Continuous>    MEDICATIONS  (PRN):  acetaminophen     Tablet .. 650 milliGRAM(s) Oral every 6 hours PRN Temp greater or equal to 38.5C (101.3F), Mild Pain (1 - 3)  bisacodyl 5 milliGRAM(s) Oral every 12 hours PRN Constipation  ondansetron Injectable 4 milliGRAM(s) IV Push every 6 hours PRN Nausea and/or Vomiting  traMADol 100 milliGRAM(s) Oral every 6 hours PRN Severe Pain (7 - 10)  traMADol 50 milliGRAM(s) Oral every 6 hours PRN Moderate Pain (4 - 6)      Drug Dosing Weight  Height (cm): 177.8 (03 Mar 2023 08:15)  Weight (kg): 95 (03 Mar 2023 08:15)  BMI (kg/m2): 30.1 (03 Mar 2023 08:15)  BSA (m2): 2.13 (03 Mar 2023 08:15)    PAST MEDICAL & SURGICAL HISTORY:  Anxiety      No significant past surgical history          REVIEW OF SYSTEMS: [ ] Unable to Assess due to neurologic exam   [ ] All ROS addressed below are non-contributory, except:  Neuro: [x] Headache [ ] Back pain [ ] Numbness [ ] Weakness [ ] Ataxia [ ] Dizziness [ ] Aphasia [ ] Dysarthria [x] photosensitivity  Resp: [ ] Shortness of breath/dyspnea, [ ] Orthopnea [ ] Cough  CV: [ ] Chest pain [ ] Palpitation [ ] Lightheadedness [ ] Syncope  Renal: [ ] Thirst [ ] Edema  GI: [ ] Nausea [ ] Emesis [ ] Abdominal pain [ ] Constipation [ ] Diarrhea  Hem: [ ] Hematemesis [ ] bright red blood per rectum  ID: [ ] Fever [ ] Chills [ ] Dysuria  ENT: [ ] Rhinorrhea    PHYSICAL EXAM:    General: No Acute Distress   Neurological: Awake, alert oriented to person, place and time, Following Commands, PERRL, EOMI, Face Symmetrical, Speech Fluent, Moving all extremities, Muscle Strength normal in all four extremities, No Drift, Sensation to Light Touch Intact  Pulmonary: Clear to Auscultation, No Rales, No Rhonchi, No Wheezes   Cardiovascular: S1, S2, Regular Rate and Rhythm   Gastrointestinal: Soft, Nontender, Nondistended   Extremities: No calf tenderness   Incision: CDI    ICU Vital Signs Last 24 Hrs  T(C): 37.3 (06 Mar 2023 18:10), Max: 37.4 (06 Mar 2023 14:07)  T(F): 99.2 (06 Mar 2023 18:10), Max: 99.3 (06 Mar 2023 14:07)  HR: 68 (06 Mar 2023 21:00) (57 - 68)  BP: 109/59 (06 Mar 2023 21:00) (92/62 - 114/70)  BP(mean): 78 (06 Mar 2023 21:00) (67 - 86)  RR: 16 (06 Mar 2023 21:00) (15 - 18)  SpO2: 95% (06 Mar 2023 21:00) (93% - 100%)    O2 Parameters below as of 06 Mar 2023 21:00  Patient On (Oxygen Delivery Method): room air    &O's Detail    05 Mar 2023 07:01  -  06 Mar 2023 07:00  --------------------------------------------------------  IN:    IV PiggyBack: 50 mL    Lactated Ringers Bolus: 1000 mL    Oral Fluid: 100 mL  Total IN: 1150 mL    OUT:    Voided (mL): 1500 mL  Total OUT: 1500 mL    Total NET: -350 mL      06 Mar 2023 07:01  -  06 Mar 2023 21:36  --------------------------------------------------------  IN:    Lactated Ringers Bolus: 500 mL    Oral Fluid: 600 mL    sodium chloride 0.9%: 100 mL  Total IN: 1200 mL    OUT:    Voided (mL): 900 mL  Total OUT: 900 mL    Total NET: 300 mL              LABS:  CBC Full  -  ( 06 Mar 2023 05:30 )  WBC Count : 10.91 K/uL  RBC Count : 3.77 M/uL  Hemoglobin : 12.0 g/dL  Hematocrit : 34.9 %  Platelet Count - Automated : 233 K/uL  Mean Cell Volume : 92.6 fl  Mean Cell Hemoglobin : 31.8 pg  Mean Cell Hemoglobin Concentration : 34.4 gm/dL  Auto Neutrophil # : x  Auto Lymphocyte # : x  Auto Monocyte # : x  Auto Eosinophil # : x  Auto Basophil # : x  Auto Neutrophil % : x  Auto Lymphocyte % : x  Auto Monocyte % : x  Auto Eosinophil % : x  Auto Basophil % : x    03-06    138  |  101  |  13  ----------------------------<  113<H>  3.9   |  28  |  0.80    Ca    9.0      06 Mar 2023 05:30  Phos  3.4     03-06  Mg     1.6     03-06            RADIOLOGY & ADDITIONAL STUDIES:   INTERVAL HISTORY: HPI:  Patient is a 33yo M with PMHx of anxiety who originally presented to [facility] with severe headache, nausea with emesis x 1 and diaphoresis and was found to have a right 6mm ICA aneurysm on CTA. Patient reports that this morning he woke up with a severe headache that was accompanied by an episode of nausea and vomiting. He also endorses that the headache was so severe he became extremely anxious, felt as if he was having a panic attack and became diaphoretic. He states that headaches are controlled from the pain medicine he received while at [place] and did not have any additional episodes of nausea/vomiting. Pending further workup with repeat CTH/CTA and possible cerebral angiogram     Denies visual changes, dizziness, cigarette smoking, family hx of brain aneurysm, previous recurrent headaches, changes in sensation, mental status, unilateral weakness, confusion.    GCS 15, MF0,  HH 1 (27 Feb 2023 23:14)      MEDICATIONS  (STANDING):  brivaracetam 50 milliGRAM(s) Oral every 12 hours  chlorhexidine 2% Cloths 1 Application(s) Topical <User Schedule>  enoxaparin Injectable 40 milliGRAM(s) SubCutaneous <User Schedule>  influenza   Vaccine 0.5 milliLiter(s) IntraMuscular once  niMODipine 30 milliGRAM(s) Oral every 2 hours  polyethylene glycol 3350 17 Gram(s) Oral daily  senna 2 Tablet(s) Oral at bedtime  sodium chloride 0.9%. 1000 milliLiter(s) (100 mL/Hr) IV Continuous <Continuous>    MEDICATIONS  (PRN):  acetaminophen     Tablet .. 650 milliGRAM(s) Oral every 6 hours PRN Temp greater or equal to 38.5C (101.3F), Mild Pain (1 - 3)  bisacodyl 5 milliGRAM(s) Oral every 12 hours PRN Constipation  ondansetron Injectable 4 milliGRAM(s) IV Push every 6 hours PRN Nausea and/or Vomiting  traMADol 100 milliGRAM(s) Oral every 6 hours PRN Severe Pain (7 - 10)  traMADol 50 milliGRAM(s) Oral every 6 hours PRN Moderate Pain (4 - 6)      Drug Dosing Weight  Height (cm): 177.8 (03 Mar 2023 08:15)  Weight (kg): 95 (03 Mar 2023 08:15)  BMI (kg/m2): 30.1 (03 Mar 2023 08:15)  BSA (m2): 2.13 (03 Mar 2023 08:15)    PAST MEDICAL & SURGICAL HISTORY:  Anxiety      No significant past surgical history          REVIEW OF SYSTEMS: [ ] Unable to Assess due to neurologic exam   [ ] All ROS addressed below are non-contributory, except:  Neuro: [x] Headache [ ] Back pain [ ] Numbness [ ] Weakness [ ] Ataxia [ ] Dizziness [ ] Aphasia [ ] Dysarthria [x] photosensitivity  Resp: [ ] Shortness of breath/dyspnea, [ ] Orthopnea [ ] Cough  CV: [ ] Chest pain [ ] Palpitation [ ] Lightheadedness [ ] Syncope  Renal: [ ] Thirst [ ] Edema  GI: [ ] Nausea [ ] Emesis [ ] Abdominal pain [ ] Constipation [ ] Diarrhea  Hem: [ ] Hematemesis [ ] bright red blood per rectum  ID: [ ] Fever [ ] Chills [ ] Dysuria  ENT: [ ] Rhinorrhea    PHYSICAL EXAM:    General: No Acute Distress   Neurological: Awake, alert oriented to person, place and time, Following Commands, PERRL, EOMI left eyes amblyopia, Face Symmetrical, Speech Fluent, Moving all extremities, Muscle Strength normal in all four extremities, No Drift, Sensation to Light Touch Intact  Pulmonary: Clear to Auscultation, No Rales, No Rhonchi, No Wheezes   Cardiovascular: S1, S2, Regular Rate and Rhythm   Gastrointestinal: Soft, Nontender, Nondistended   Extremities: No calf tenderness   Incision: CDI    ICU Vital Signs Last 24 Hrs  T(C): 37.3 (06 Mar 2023 18:10), Max: 37.4 (06 Mar 2023 14:07)  T(F): 99.2 (06 Mar 2023 18:10), Max: 99.3 (06 Mar 2023 14:07)  HR: 68 (06 Mar 2023 21:00) (57 - 68)  BP: 109/59 (06 Mar 2023 21:00) (92/62 - 114/70)  BP(mean): 78 (06 Mar 2023 21:00) (67 - 86)  RR: 16 (06 Mar 2023 21:00) (15 - 18)  SpO2: 95% (06 Mar 2023 21:00) (93% - 100%)    O2 Parameters below as of 06 Mar 2023 21:00  Patient On (Oxygen Delivery Method): room air    &O's Detail    05 Mar 2023 07:01  -  06 Mar 2023 07:00  --------------------------------------------------------  IN:    IV PiggyBack: 50 mL    Lactated Ringers Bolus: 1000 mL    Oral Fluid: 100 mL  Total IN: 1150 mL    OUT:    Voided (mL): 1500 mL  Total OUT: 1500 mL    Total NET: -350 mL      06 Mar 2023 07:01  -  06 Mar 2023 21:36  --------------------------------------------------------  IN:    Lactated Ringers Bolus: 500 mL    Oral Fluid: 600 mL    sodium chloride 0.9%: 100 mL  Total IN: 1200 mL    OUT:    Voided (mL): 900 mL  Total OUT: 900 mL    Total NET: 300 mL              LABS:  CBC Full  -  ( 06 Mar 2023 05:30 )  WBC Count : 10.91 K/uL  RBC Count : 3.77 M/uL  Hemoglobin : 12.0 g/dL  Hematocrit : 34.9 %  Platelet Count - Automated : 233 K/uL  Mean Cell Volume : 92.6 fl  Mean Cell Hemoglobin : 31.8 pg  Mean Cell Hemoglobin Concentration : 34.4 gm/dL  Auto Neutrophil # : x  Auto Lymphocyte # : x  Auto Monocyte # : x  Auto Eosinophil # : x  Auto Basophil # : x  Auto Neutrophil % : x  Auto Lymphocyte % : x  Auto Monocyte % : x  Auto Eosinophil % : x  Auto Basophil % : x    03-06    138  |  101  |  13  ----------------------------<  113<H>  3.9   |  28  |  0.80    Ca    9.0      06 Mar 2023 05:30  Phos  3.4     03-06  Mg     1.6     03-06            RADIOLOGY & ADDITIONAL STUDIES:   INTERVAL HISTORY: HPI:  Patient is a 31yo M with PMHx of anxiety who originally presented to [facility] with severe headache, nausea with emesis x 1 and diaphoresis and was found to have a right 6mm ICA aneurysm on CTA. Patient reports that this morning he woke up with a severe headache that was accompanied by an episode of nausea and vomiting. He also endorses that the headache was so severe he became extremely anxious, felt as if he was having a panic attack and became diaphoretic. He states that headaches are controlled from the pain medicine he received while at [place] and did not have any additional episodes of nausea/vomiting. Pending further workup with repeat CTH/CTA and possible cerebral angiogram     Denies visual changes, dizziness, cigarette smoking, family hx of brain aneurysm, previous recurrent headaches, changes in sensation, mental status, unilateral weakness, confusion.    GCS 15, MF0,  HH 1 (27 Feb 2023 23:14)      MEDICATIONS  (STANDING):  brivaracetam 50 milliGRAM(s) Oral every 12 hours  chlorhexidine 2% Cloths 1 Application(s) Topical <User Schedule>  enoxaparin Injectable 40 milliGRAM(s) SubCutaneous <User Schedule>  influenza   Vaccine 0.5 milliLiter(s) IntraMuscular once  niMODipine 30 milliGRAM(s) Oral every 2 hours  polyethylene glycol 3350 17 Gram(s) Oral daily  senna 2 Tablet(s) Oral at bedtime  sodium chloride 0.9%. 1000 milliLiter(s) (100 mL/Hr) IV Continuous <Continuous>    MEDICATIONS  (PRN):  acetaminophen     Tablet .. 650 milliGRAM(s) Oral every 6 hours PRN Temp greater or equal to 38.5C (101.3F), Mild Pain (1 - 3)  bisacodyl 5 milliGRAM(s) Oral every 12 hours PRN Constipation  ondansetron Injectable 4 milliGRAM(s) IV Push every 6 hours PRN Nausea and/or Vomiting  traMADol 100 milliGRAM(s) Oral every 6 hours PRN Severe Pain (7 - 10)  traMADol 50 milliGRAM(s) Oral every 6 hours PRN Moderate Pain (4 - 6)      Drug Dosing Weight  Height (cm): 177.8 (03 Mar 2023 08:15)  Weight (kg): 95 (03 Mar 2023 08:15)  BMI (kg/m2): 30.1 (03 Mar 2023 08:15)  BSA (m2): 2.13 (03 Mar 2023 08:15)    PAST MEDICAL & SURGICAL HISTORY:  Anxiety      No significant past surgical history          REVIEW OF SYSTEMS: [ ] Unable to Assess due to neurologic exam   [ ] All ROS addressed below are non-contributory, except:  Neuro: [x] Headache [ ] Back pain [ ] Numbness [ ] Weakness [ ] Ataxia [ ] Dizziness [ ] Aphasia [ ] Dysarthria [x] photosensitivity  Resp: [ ] Shortness of breath/dyspnea, [ ] Orthopnea [ ] Cough  CV: [ ] Chest pain [ ] Palpitation [ ] Lightheadedness [ ] Syncope  Renal: [ ] Thirst [ ] Edema  GI: [ ] Nausea [ ] Emesis [ ] Abdominal pain [ ] Constipation [ ] Diarrhea  Hem: [ ] Hematemesis [ ] bright red blood per rectum  ID: [ ] Fever [ ] Chills [ ] Dysuria  ENT: [ ] Rhinorrhea    PHYSICAL EXAM:    General: No Acute Distress   Neurological: Awake, alert oriented to person, place and time, Following Commands, PERRL, EOMI left eyes esotropia, Face Symmetrical, Speech Fluent, Moving all extremities, Muscle Strength normal in all four extremities, No Drift, Sensation to Light Touch Intact  Pulmonary: Clear to Auscultation, No Rales, No Rhonchi, No Wheezes   Cardiovascular: S1, S2, Regular Rate and Rhythm   Gastrointestinal: Soft, Nontender, Nondistended   Extremities: No calf tenderness   Incision: CDI    ICU Vital Signs Last 24 Hrs  T(C): 37.3 (06 Mar 2023 18:10), Max: 37.4 (06 Mar 2023 14:07)  T(F): 99.2 (06 Mar 2023 18:10), Max: 99.3 (06 Mar 2023 14:07)  HR: 68 (06 Mar 2023 21:00) (57 - 68)  BP: 109/59 (06 Mar 2023 21:00) (92/62 - 114/70)  BP(mean): 78 (06 Mar 2023 21:00) (67 - 86)  RR: 16 (06 Mar 2023 21:00) (15 - 18)  SpO2: 95% (06 Mar 2023 21:00) (93% - 100%)    O2 Parameters below as of 06 Mar 2023 21:00  Patient On (Oxygen Delivery Method): room air    &O's Detail    05 Mar 2023 07:01  -  06 Mar 2023 07:00  --------------------------------------------------------  IN:    IV PiggyBack: 50 mL    Lactated Ringers Bolus: 1000 mL    Oral Fluid: 100 mL  Total IN: 1150 mL    OUT:    Voided (mL): 1500 mL  Total OUT: 1500 mL    Total NET: -350 mL      06 Mar 2023 07:01  -  06 Mar 2023 21:36  --------------------------------------------------------  IN:    Lactated Ringers Bolus: 500 mL    Oral Fluid: 600 mL    sodium chloride 0.9%: 100 mL  Total IN: 1200 mL    OUT:    Voided (mL): 900 mL  Total OUT: 900 mL    Total NET: 300 mL              LABS:  CBC Full  -  ( 06 Mar 2023 05:30 )  WBC Count : 10.91 K/uL  RBC Count : 3.77 M/uL  Hemoglobin : 12.0 g/dL  Hematocrit : 34.9 %  Platelet Count - Automated : 233 K/uL  Mean Cell Volume : 92.6 fl  Mean Cell Hemoglobin : 31.8 pg  Mean Cell Hemoglobin Concentration : 34.4 gm/dL  Auto Neutrophil # : x  Auto Lymphocyte # : x  Auto Monocyte # : x  Auto Eosinophil # : x  Auto Basophil # : x  Auto Neutrophil % : x  Auto Lymphocyte % : x  Auto Monocyte % : x  Auto Eosinophil % : x  Auto Basophil % : x    03-06    138  |  101  |  13  ----------------------------<  113<H>  3.9   |  28  |  0.80    Ca    9.0      06 Mar 2023 05:30  Phos  3.4     03-06  Mg     1.6     03-06            RADIOLOGY & ADDITIONAL STUDIES:

## 2023-03-06 NOTE — PROGRESS NOTE ADULT - ASSESSMENT
33 y/o M found to have ruptured R PCOMM s/p DSA w/o intervention now s/p crani & clipping c/b intraop re-rupture. Bleed day 8     -c/w q1 neuro-checks   -start dexamethasone 4q6, 2g Mg, tramadol for headaches   -maintain euvolumia   -SBP goal 100-180  -AM labs w/ anti-xa level 1am

## 2023-03-06 NOTE — PROCEDURE NOTE - GENERAL PROCEDURE DETAILS
Bedside ultrasound performed of bilateral anterior circulation and posterior circulation. Some challenges on right transtemporal side due to post-operative swelling. Findings show an LR ration on right of 4.22 which is likely due to cerebral edema. Left LR is 1.89. Full report to follow.

## 2023-03-07 LAB
ANION GAP SERPL CALC-SCNC: 10 MMOL/L — SIGNIFICANT CHANGE UP (ref 5–17)
ANION GAP SERPL CALC-SCNC: 9 MMOL/L — SIGNIFICANT CHANGE UP (ref 5–17)
BUN SERPL-MCNC: 13 MG/DL — SIGNIFICANT CHANGE UP (ref 7–23)
BUN SERPL-MCNC: 14 MG/DL — SIGNIFICANT CHANGE UP (ref 7–23)
CALCIUM SERPL-MCNC: 8.7 MG/DL — SIGNIFICANT CHANGE UP (ref 8.4–10.5)
CALCIUM SERPL-MCNC: 9.6 MG/DL — SIGNIFICANT CHANGE UP (ref 8.4–10.5)
CHLORIDE SERPL-SCNC: 102 MMOL/L — SIGNIFICANT CHANGE UP (ref 96–108)
CHLORIDE SERPL-SCNC: 99 MMOL/L — SIGNIFICANT CHANGE UP (ref 96–108)
CO2 SERPL-SCNC: 25 MMOL/L — SIGNIFICANT CHANGE UP (ref 22–31)
CO2 SERPL-SCNC: 26 MMOL/L — SIGNIFICANT CHANGE UP (ref 22–31)
CREAT SERPL-MCNC: 0.69 MG/DL — SIGNIFICANT CHANGE UP (ref 0.5–1.3)
CREAT SERPL-MCNC: 0.73 MG/DL — SIGNIFICANT CHANGE UP (ref 0.5–1.3)
EGFR: 124 ML/MIN/1.73M2 — SIGNIFICANT CHANGE UP
EGFR: 126 ML/MIN/1.73M2 — SIGNIFICANT CHANGE UP
GLUCOSE SERPL-MCNC: 130 MG/DL — HIGH (ref 70–99)
GLUCOSE SERPL-MCNC: 143 MG/DL — HIGH (ref 70–99)
HCT VFR BLD CALC: 38.3 % — LOW (ref 39–50)
HGB BLD-MCNC: 13.1 G/DL — SIGNIFICANT CHANGE UP (ref 13–17)
MAGNESIUM SERPL-MCNC: 2.3 MG/DL — SIGNIFICANT CHANGE UP (ref 1.6–2.6)
MCHC RBC-ENTMCNC: 31.6 PG — SIGNIFICANT CHANGE UP (ref 27–34)
MCHC RBC-ENTMCNC: 34.2 GM/DL — SIGNIFICANT CHANGE UP (ref 32–36)
MCV RBC AUTO: 92.3 FL — SIGNIFICANT CHANGE UP (ref 80–100)
NRBC # BLD: 0 /100 WBCS — SIGNIFICANT CHANGE UP (ref 0–0)
OSMOLALITY SERPL: 287 MOSM/KG — SIGNIFICANT CHANGE UP (ref 275–300)
OSMOLALITY UR: 535 MOSM/KG — SIGNIFICANT CHANGE UP (ref 300–900)
PHOSPHATE SERPL-MCNC: 3.1 MG/DL — SIGNIFICANT CHANGE UP (ref 2.5–4.5)
PLATELET # BLD AUTO: 246 K/UL — SIGNIFICANT CHANGE UP (ref 150–400)
POTASSIUM SERPL-MCNC: 4.7 MMOL/L — SIGNIFICANT CHANGE UP (ref 3.5–5.3)
POTASSIUM SERPL-MCNC: 4.9 MMOL/L — SIGNIFICANT CHANGE UP (ref 3.5–5.3)
POTASSIUM SERPL-SCNC: 4.7 MMOL/L — SIGNIFICANT CHANGE UP (ref 3.5–5.3)
POTASSIUM SERPL-SCNC: 4.9 MMOL/L — SIGNIFICANT CHANGE UP (ref 3.5–5.3)
RBC # BLD: 4.15 M/UL — LOW (ref 4.2–5.8)
RBC # FLD: 13.7 % — SIGNIFICANT CHANGE UP (ref 10.3–14.5)
SODIUM SERPL-SCNC: 133 MMOL/L — LOW (ref 135–145)
SODIUM SERPL-SCNC: 138 MMOL/L — SIGNIFICANT CHANGE UP (ref 135–145)
SODIUM UR-SCNC: 123 MMOL/L — SIGNIFICANT CHANGE UP
WBC # BLD: 11.12 K/UL — HIGH (ref 3.8–10.5)
WBC # FLD AUTO: 11.12 K/UL — HIGH (ref 3.8–10.5)

## 2023-03-07 PROCEDURE — 99291 CRITICAL CARE FIRST HOUR: CPT

## 2023-03-07 PROCEDURE — 99024 POSTOP FOLLOW-UP VISIT: CPT

## 2023-03-07 RX ORDER — SODIUM CHLORIDE 5 G/100ML
100 INJECTION, SOLUTION INTRAVENOUS ONCE
Refills: 0 | Status: COMPLETED | OUTPATIENT
Start: 2023-03-07 | End: 2023-03-07

## 2023-03-07 RX ORDER — SODIUM CHLORIDE 9 MG/ML
1000 INJECTION INTRAMUSCULAR; INTRAVENOUS; SUBCUTANEOUS
Refills: 0 | Status: DISCONTINUED | OUTPATIENT
Start: 2023-03-07 | End: 2023-03-07

## 2023-03-07 RX ORDER — SODIUM CHLORIDE 9 MG/ML
1000 INJECTION INTRAMUSCULAR; INTRAVENOUS; SUBCUTANEOUS
Refills: 0 | Status: DISCONTINUED | OUTPATIENT
Start: 2023-03-07 | End: 2023-03-08

## 2023-03-07 RX ORDER — TRAMADOL HYDROCHLORIDE 50 MG/1
100 TABLET ORAL EVERY 6 HOURS
Refills: 0 | Status: DISCONTINUED | OUTPATIENT
Start: 2023-03-07 | End: 2023-03-08

## 2023-03-07 RX ADMIN — TRAMADOL HYDROCHLORIDE 100 MILLIGRAM(S): 50 TABLET ORAL at 10:41

## 2023-03-07 RX ADMIN — BRIVARACETAM 50 MILLIGRAM(S): 25 TABLET, FILM COATED ORAL at 18:08

## 2023-03-07 RX ADMIN — TRAMADOL HYDROCHLORIDE 100 MILLIGRAM(S): 50 TABLET ORAL at 23:00

## 2023-03-07 RX ADMIN — BRIVARACETAM 50 MILLIGRAM(S): 25 TABLET, FILM COATED ORAL at 05:34

## 2023-03-07 RX ADMIN — TRAMADOL HYDROCHLORIDE 100 MILLIGRAM(S): 50 TABLET ORAL at 10:02

## 2023-03-07 RX ADMIN — NIMODIPINE 30 MILLIGRAM(S): 60 SOLUTION ORAL at 00:43

## 2023-03-07 RX ADMIN — Medication 4 MILLIGRAM(S): at 10:01

## 2023-03-07 RX ADMIN — TRAMADOL HYDROCHLORIDE 100 MILLIGRAM(S): 50 TABLET ORAL at 16:16

## 2023-03-07 RX ADMIN — TRAMADOL HYDROCHLORIDE 100 MILLIGRAM(S): 50 TABLET ORAL at 06:57

## 2023-03-07 RX ADMIN — NIMODIPINE 30 MILLIGRAM(S): 60 SOLUTION ORAL at 12:07

## 2023-03-07 RX ADMIN — TRAMADOL HYDROCHLORIDE 100 MILLIGRAM(S): 50 TABLET ORAL at 17:04

## 2023-03-07 RX ADMIN — Medication 1 CAPSULE(S): at 10:00

## 2023-03-07 RX ADMIN — Medication 1 CAPSULE(S): at 17:04

## 2023-03-07 RX ADMIN — Medication 4 MILLIGRAM(S): at 16:18

## 2023-03-07 RX ADMIN — ENOXAPARIN SODIUM 40 MILLIGRAM(S): 100 INJECTION SUBCUTANEOUS at 22:54

## 2023-03-07 RX ADMIN — TRAMADOL HYDROCHLORIDE 100 MILLIGRAM(S): 50 TABLET ORAL at 22:53

## 2023-03-07 RX ADMIN — TRAMADOL HYDROCHLORIDE 100 MILLIGRAM(S): 50 TABLET ORAL at 05:34

## 2023-03-07 RX ADMIN — Medication 4 MILLIGRAM(S): at 05:34

## 2023-03-07 RX ADMIN — NIMODIPINE 30 MILLIGRAM(S): 60 SOLUTION ORAL at 10:04

## 2023-03-07 RX ADMIN — Medication 1 CAPSULE(S): at 16:15

## 2023-03-07 RX ADMIN — Medication 1 CAPSULE(S): at 06:57

## 2023-03-07 RX ADMIN — NIMODIPINE 30 MILLIGRAM(S): 60 SOLUTION ORAL at 02:32

## 2023-03-07 RX ADMIN — Medication 1 CAPSULE(S): at 10:41

## 2023-03-07 RX ADMIN — SODIUM CHLORIDE 600 MILLILITER(S): 5 INJECTION, SOLUTION INTRAVENOUS at 09:29

## 2023-03-07 RX ADMIN — Medication 1 CAPSULE(S): at 05:33

## 2023-03-07 RX ADMIN — NIMODIPINE 30 MILLIGRAM(S): 60 SOLUTION ORAL at 14:19

## 2023-03-07 NOTE — PROGRESS NOTE ADULT - SUBJECTIVE AND OBJECTIVE BOX
INTERVAL HISTORY: HPI:  Patient is a 33yo M with PMHx of anxiety who originally presented to [facility] with severe headache, nausea with emesis x 1 and diaphoresis and was found to have a right 6mm ICA aneurysm on CTA. Patient reports that this morning he woke up with a severe headache that was accompanied by an episode of nausea and vomiting. He also endorses that the headache was so severe he became extremely anxious, felt as if he was having a panic attack and became diaphoretic. He states that headaches are controlled from the pain medicine he received while at [place] and did not have any additional episodes of nausea/vomiting. Pending further workup with repeat CTH/CTA and possible cerebral angiogram     Denies visual changes, dizziness, cigarette smoking, family hx of brain aneurysm, previous recurrent headaches, changes in sensation, mental status, unilateral weakness, confusion. (27 Feb 2023 23:14)    PAST MEDICAL & SURGICAL HISTORY:  Anxiety  No significant past surgical history    REVIEW OF SYSTEMS: [ ] Unable to Assess due to neurologic exam   [x] All ROS addressed below are non-contributory, except:  Neuro: [ ] Headache [ ] Back pain [ ] Numbness [ ] Weakness [ ] Ataxia [ ] Dizziness [ ] Aphasia [ ] Dysarthria [ ] Visual disturbance  Resp: [ ] Shortness of breath/dyspnea, [ ] Orthopnea [ ] Cough  CV: [ ] Chest pain [ ] Palpitation [ ] Lightheadedness [ ] Syncope  Renal: [ ] Thirst [ ] Edema  GI: [ ] Nausea [ ] Emesis [ ] Abdominal pain [ ] Constipation [ ] Diarrhea  Hem: [ ] Hematemesis [ ] bright red blood per rectum  ID: [ ] Fever [ ] Chills [ ] Dysuria  ENT: [ ] Rhinorrhea      ICU Vital Signs Last 24 Hrs  T(C): 37.2 (07 Mar 2023 05:33), Max: 37.4 (06 Mar 2023 14:07)  T(F): 99 (07 Mar 2023 05:33), Max: 99.3 (06 Mar 2023 14:07)  HR: 64 (07 Mar 2023 06:00) (60 - 76)  BP: 113/80 (07 Mar 2023 06:00) (103/59 - 117/66)  BP(mean): 89 (07 Mar 2023 06:00) (75 - 89)  RR: 16 (07 Mar 2023 06:00) (16 - 18)  SpO2: 97% (07 Mar 2023 06:00) (93% - 97%)      03-06-23 @ 07:01  -  03-07-23 @ 07:00  --------------------------------------------------------  IN: 2200 mL / OUT: 2225 mL / NET: -25 mL      PHYSICAL EXAM:  General: No Acute Distress,  Neurological: Eyes open to voice, follows commands, pupils 2mm reactive, b/l UE 5/5 b/l LE 5/5   Pulmonary: Clear to Auscultation, No Rales, No Rhonchi, No Wheezes   Cardiovascular: S1, S2, Regular Rate and Rhythm   Gastrointestinal: Soft, Nontender, Nondistended   Extremities: No calf tenderness     MEDICATIONS:   acetaminophen 300 mG/butalbital 50 mG/ caffeine 40 mG 1 Capsule(s) Oral every 6 hours  bisacodyl 5 milliGRAM(s) Oral every 12 hours PRN  brivaracetam 50 milliGRAM(s) Oral every 12 hours  chlorhexidine 2% Cloths 1 Application(s) Topical <User Schedule>  dexAMETHasone     Tablet 4 milliGRAM(s) Oral every 6 hours  dexAMETHasone     Tablet   Oral   enoxaparin Injectable 40 milliGRAM(s) SubCutaneous <User Schedule>  influenza   Vaccine 0.5 milliLiter(s) IntraMuscular once  niMODipine 30 milliGRAM(s) Oral every 2 hours  ondansetron Injectable 4 milliGRAM(s) IV Push every 6 hours PRN  polyethylene glycol 3350 17 Gram(s) Oral daily  senna 2 Tablet(s) Oral at bedtime  sodium chloride 0.9%. 1000 milliLiter(s) (100 mL/Hr) IV Continuous <Continuous>  sodium chloride 3% Bolus 100 milliLiter(s) IV Bolus once  traMADol 100 milliGRAM(s) Oral every 6 hours      LABS:                     13.1   11.12 )-----------( 246      ( 07 Mar 2023 05:27 )             38.3     03-07    133<L>  |  99  |  13  ----------------------------<  130<H>  4.9   |  25  |  0.73    Ca    8.7      07 Mar 2023 05:27  Phos  3.1     03-07  Mg     2.3     03-07        RADIOLOGY & ADDITIONAL STUDIES:  < from: CT Angio Neck w/ IV Cont (02.28.23 @ 01:15) >  Intracranial CTA: 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    1 mm superiorly directed outpouching of the paraclinoid right ICA which   may represent origin of the ophthalmic artery versus small aneurysm..    Extracranial CTA: No steno-occlusive disease.    < end of copied text >     INTERVAL HISTORY: HPI:  Patient is a 31 y/o M with PMHx of anxiety who originally presented to [facility] with severe headache, nausea with emesis x 1 and diaphoresis and was found to have a right 6mm ICA aneurysm on CTA. Patient reports that this morning he woke up with a severe headache that was accompanied by an episode of nausea and vomiting. He also endorses that the headache was so severe he became extremely anxious, felt as if he was having a panic attack and became diaphoretic. He states that headaches are controlled from the pain medicine he received while at [place] and did not have any additional episodes of nausea/vomiting. Pending further workup with repeat CTH/CTA and possible cerebral angiogram     Denies visual changes, dizziness, cigarette smoking, family hx of brain aneurysm, previous recurrent headaches, changes in sensation, mental status, unilateral weakness, confusion. (27 Feb 2023 23:14)    PAST MEDICAL & SURGICAL HISTORY:  Anxiety  No significant past surgical history    REVIEW OF SYSTEMS: [ ] Unable to Assess due to neurologic exam   [x] All ROS addressed below are non-contributory, except:  Neuro: [ ] Headache [ ] Back pain [ ] Numbness [ ] Weakness [ ] Ataxia [ ] Dizziness [ ] Aphasia [ ] Dysarthria [ ] Visual disturbance  Resp: [ ] Shortness of breath/dyspnea, [ ] Orthopnea [ ] Cough  CV: [ ] Chest pain [ ] Palpitation [ ] Lightheadedness [ ] Syncope  Renal: [ ] Thirst [ ] Edema  GI: [ ] Nausea [ ] Emesis [ ] Abdominal pain [ ] Constipation [ ] Diarrhea  Hem: [ ] Hematemesis [ ] bright red blood per rectum  ID: [ ] Fever [ ] Chills [ ] Dysuria  ENT: [ ] Rhinorrhea      ICU Vital Signs Last 24 Hrs  T(C): 37.2 (07 Mar 2023 05:33), Max: 37.4 (06 Mar 2023 14:07)  T(F): 99 (07 Mar 2023 05:33), Max: 99.3 (06 Mar 2023 14:07)  HR: 64 (07 Mar 2023 06:00) (60 - 76)  BP: 113/80 (07 Mar 2023 06:00) (103/59 - 117/66)  BP(mean): 89 (07 Mar 2023 06:00) (75 - 89)  RR: 16 (07 Mar 2023 06:00) (16 - 18)  SpO2: 97% (07 Mar 2023 06:00) (93% - 97%)      03-06-23 @ 07:01  -  03-07-23 @ 07:00  --------------------------------------------------------  IN: 2200 mL / OUT: 2225 mL / NET: -25 mL      PHYSICAL EXAM:  General: No Acute Distress,  Neurological: Eyes open to voice, follows commands, pupils 2mm reactive, b/l UE 5/5 b/l LE 5/5   Pulmonary: Clear to Auscultation, No Rales, No Rhonchi, No Wheezes   Cardiovascular: S1, S2, Regular Rate and Rhythm   Gastrointestinal: Soft, Nontender, Nondistended   Extremities: No calf tenderness     MEDICATIONS:   acetaminophen 300 mG/butalbital 50 mG/ caffeine 40 mG 1 Capsule(s) Oral every 6 hours  bisacodyl 5 milliGRAM(s) Oral every 12 hours PRN  brivaracetam 50 milliGRAM(s) Oral every 12 hours  chlorhexidine 2% Cloths 1 Application(s) Topical <User Schedule>  dexAMETHasone     Tablet 4 milliGRAM(s) Oral every 6 hours  dexAMETHasone     Tablet   Oral   enoxaparin Injectable 40 milliGRAM(s) SubCutaneous <User Schedule>  influenza   Vaccine 0.5 milliLiter(s) IntraMuscular once  niMODipine 30 milliGRAM(s) Oral every 2 hours  ondansetron Injectable 4 milliGRAM(s) IV Push every 6 hours PRN  polyethylene glycol 3350 17 Gram(s) Oral daily  senna 2 Tablet(s) Oral at bedtime  sodium chloride 0.9%. 1000 milliLiter(s) (100 mL/Hr) IV Continuous <Continuous>  sodium chloride 3% Bolus 100 milliLiter(s) IV Bolus once  traMADol 100 milliGRAM(s) Oral every 6 hours      LABS:                     13.1   11.12 )-----------( 246      ( 07 Mar 2023 05:27 )             38.3     03-07    133<L>  |  99  |  13  ----------------------------<  130<H>  4.9   |  25  |  0.73    Ca    8.7      07 Mar 2023 05:27  Phos  3.1     03-07  Mg     2.3     03-07        RADIOLOGY & ADDITIONAL STUDIES:  < from: CT Angio Neck w/ IV Cont (02.28.23 @ 01:15) >  Intracranial CTA: 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    1 mm superiorly directed outpouching of the paraclinoid right ICA which   may represent origin of the ophthalmic artery versus small aneurysm..    Extracranial CTA: No steno-occlusive disease.    < end of copied text >

## 2023-03-07 NOTE — PROGRESS NOTE ADULT - ASSESSMENT
33 y/o M found to have ruptured R PCOMM s/p DSA w/o intervention now s/p crani & clipping c/b intraop re-rupture. Bleed day 9     -c/w q1 neuro-checks   -c/w dexamethasone 4q6, tramadol, PRN Fioricet    -maintain euvolemia   -SBP goal 100-180  -AM labs w/ anti-xa level 1am, c/w lovenox SQ   -CTA head tomorrow start IVF for renal hydration NS @ 75cc/hr

## 2023-03-07 NOTE — CHART NOTE - NSCHARTNOTEFT_GEN_A_CORE
BD9, POD6 crani/clipping R PCOMM aneurysm, POD4 s/p angio. NICK overnight, started on headache cocktail and decadron taper. Started back on IVF for poor PO intake, can d/c once tolerating PO. Pend CTA BD#10, pend echo. Cont Briviact, neuro/vitals q1hr. 100mL 3% Na bolus in AM.  4pm BMP Na improved to 138 from 133. D/c'd maintenance fluids.

## 2023-03-07 NOTE — PROGRESS NOTE ADULT - SUBJECTIVE AND OBJECTIVE BOX
INTERVAL HISTORY: HPI:  Patient is a 33yo M with PMHx of anxiety who originally presented to [facility] with severe headache, nausea with emesis x 1 and diaphoresis and was found to have a right 6mm ICA aneurysm on CTA. Patient reports that this morning he woke up with a severe headache that was accompanied by an episode of nausea and vomiting. He also endorses that the headache was so severe he became extremely anxious, felt as if he was having a panic attack and became diaphoretic. He states that headaches are controlled from the pain medicine he received while at [place] and did not have any additional episodes of nausea/vomiting. Pending further workup with repeat CTH/CTA and possible cerebral angiogram     Denies visual changes, dizziness, cigarette smoking, family hx of brain aneurysm, previous recurrent headaches, changes in sensation, mental status, unilateral weakness, confusion.    GCS 15, MF0,  HH 1 (27 Feb 2023 23:14)    Drug Dosing Weight  Height (cm): 177.8 (03 Mar 2023 08:15)  Weight (kg): 95 (03 Mar 2023 08:15)  BMI (kg/m2): 30.1 (03 Mar 2023 08:15)  BSA (m2): 2.13 (03 Mar 2023 08:15)    PAST MEDICAL & SURGICAL HISTORY:  Anxiety      No significant past surgical history          REVIEW OF SYSTEMS: [ ] Unable to Assess due to neurologic exam   [x] All ROS addressed below are non-contributory, except:  Neuro: [] Headache [ ] Back pain [ ] Numbness [ ] Weakness [ ] Ataxia [ ] Dizziness [ ] Aphasia [ ] Dysarthria [ ] photosensitivity  Resp: [ ] Shortness of breath/dyspnea, [ ] Orthopnea [ ] Cough  CV: [ ] Chest pain [ ] Palpitation [ ] Lightheadedness [ ] Syncope  Renal: [ ] Thirst [ ] Edema  GI: [ ] Nausea [ ] Emesis [ ] Abdominal pain [ ] Constipation [ ] Diarrhea  Hem: [ ] Hematemesis [ ] bright red blood per rectum  ID: [ ] Fever [ ] Chills [ ] Dysuria  ENT: [ ] Rhinorrhea    PHYSICAL EXAM:    General: No Acute Distress   Neurological: Awake, alert oriented to person, place and time, Following Commands, PERRL, EOMI, Face Symmetrical, Speech Fluent, Moving all extremities, Muscle Strength normal in all four extremities, No Drift, Sensation to Light Touch Intact  Pulmonary: Clear to Auscultation, No Rales, No Rhonchi, No Wheezes   Cardiovascular: S1, S2, Regular Rate and Rhythm   Gastrointestinal: Soft, Nontender, Nondistended   Extremities: No calf tenderness   Incision: CDI    ICU Vital Signs Last 24 Hrs  T(C): 37 (07 Mar 2023 22:05), Max: 37.2 (07 Mar 2023 05:33)  T(F): 98.6 (07 Mar 2023 22:05), Max: 99 (07 Mar 2023 05:33)  HR: 62 (07 Mar 2023 22:00) (61 - 92)  BP: 96/54 (07 Mar 2023 22:00) (96/54 - 123/77)  BP(mean): 69 (07 Mar 2023 22:00) (68 - 95)  RR: 16 (07 Mar 2023 22:00) (16 - 19)  SpO2: 95% (07 Mar 2023 22:00) (94% - 97%)      03-06-23 @ 07:01  -  03-07-23 @ 07:00  --------------------------------------------------------  IN: 2300 mL / OUT: 2225 mL / NET: 75 mL    03-07-23 @ 07:01  -  03-07-23 @ 22:49  --------------------------------------------------------  IN: 900 mL / OUT: 900 mL / NET: 0 mL      bisacodyl 5 milliGRAM(s) Oral every 12 hours PRN  brivaracetam 50 milliGRAM(s) Oral every 12 hours  chlorhexidine 2% Cloths 1 Application(s) Topical <User Schedule>  dexAMETHasone     Tablet   Oral   enoxaparin Injectable 40 milliGRAM(s) SubCutaneous <User Schedule>  influenza   Vaccine 0.5 milliLiter(s) IntraMuscular once  niMODipine 30 milliGRAM(s) Oral every 2 hours  ondansetron Injectable 4 milliGRAM(s) IV Push every 6 hours PRN  polyethylene glycol 3350 17 Gram(s) Oral daily  senna 2 Tablet(s) Oral at bedtime  traMADol 100 milliGRAM(s) Oral every 6 hours      LABS:  Na: 138 (03-07 @ 15:26), 133 (03-07 @ 05:27), 138 (03-06 @ 05:30), 138 (03-05 @ 05:50)  K: 4.7 (03-07 @ 15:26), 4.9 (03-07 @ 05:27), 3.9 (03-06 @ 05:30), 4.0 (03-05 @ 05:50)  Cl: 102 (03-07 @ 15:26), 99 (03-07 @ 05:27), 101 (03-06 @ 05:30), 103 (03-05 @ 05:50)  CO2: 26 (03-07 @ 15:26), 25 (03-07 @ 05:27), 28 (03-06 @ 05:30), 27 (03-05 @ 05:50)  BUN: 14 (03-07 @ 15:26), 13 (03-07 @ 05:27), 13 (03-06 @ 05:30), 13 (03-05 @ 05:50)  Cr: 0.69 (03-07 @ 15:26), 0.73 (03-07 @ 05:27), 0.80 (03-06 @ 05:30), 0.77 (03-05 @ 05:50)  Glu: 143(03-07 @ 15:26), 130(03-07 @ 05:27), 113(03-06 @ 05:30), 103(03-05 @ 05:50)    Hgb: 13.1 (03-07 @ 05:27), 12.0 (03-06 @ 05:30), 11.4 (03-05 @ 05:50)  Hct: 38.3 (03-07 @ 05:27), 34.9 (03-06 @ 05:30), 33.8 (03-05 @ 05:50)  WBC: 11.12 (03-07 @ 05:27), 10.91 (03-06 @ 05:30), 12.08 (03-05 @ 05:50)  Plt: 246 (03-07 @ 05:27), 233 (03-06 @ 05:30), 213 (03-05 @ 05:50)                 INTERVAL HISTORY: HPI:  Patient is a 31yo M with PMHx of anxiety who originally presented to [facility] with severe headache, nausea with emesis x 1 and diaphoresis and was found to have a right 6mm ICA aneurysm on CTA. Patient reports that this morning he woke up with a severe headache that was accompanied by an episode of nausea and vomiting. He also endorses that the headache was so severe he became extremely anxious, felt as if he was having a panic attack and became diaphoretic. He states that headaches are controlled from the pain medicine he received while at [place] and did not have any additional episodes of nausea/vomiting. Pending further workup with repeat CTH/CTA and possible cerebral angiogram     Denies visual changes, dizziness, cigarette smoking, family hx of brain aneurysm, previous recurrent headaches, changes in sensation, mental status, unilateral weakness, confusion.    GCS 15, MF0,  HH 1 (27 Feb 2023 23:14)    Drug Dosing Weight  Height (cm): 177.8 (03 Mar 2023 08:15)  Weight (kg): 95 (03 Mar 2023 08:15)  BMI (kg/m2): 30.1 (03 Mar 2023 08:15)  BSA (m2): 2.13 (03 Mar 2023 08:15)    PAST MEDICAL & SURGICAL HISTORY:  Anxiety      No significant past surgical history          REVIEW OF SYSTEMS: [ ] Unable to Assess due to neurologic exam   [x] All ROS addressed below are non-contributory, except:  Neuro: [] Headache [ ] Back pain [ ] Numbness [ ] Weakness [ ] Ataxia [ ] Dizziness [ ] Aphasia [ ] Dysarthria [ ] photosensitivity  Resp: [ ] Shortness of breath/dyspnea, [ ] Orthopnea [ ] Cough  CV: [ ] Chest pain [ ] Palpitation [ ] Lightheadedness [ ] Syncope  Renal: [ ] Thirst [ ] Edema  GI: [ ] Nausea [ ] Emesis [ ] Abdominal pain [ ] Constipation [ ] Diarrhea  Hem: [ ] Hematemesis [ ] bright red blood per rectum  ID: [ ] Fever [ ] Chills [ ] Dysuria  ENT: [ ] Rhinorrhea    PHYSICAL EXAM:    General: No Acute Distress   Neurological: left eye amblyopia, Awake, alert oriented to person, place and time, Following Commands, PERRL, EOMI, Face Symmetrical, Speech Fluent, Moving all extremities, Muscle Strength normal in all four extremities, No Drift, Sensation to Light Touch Intact  Pulmonary: Clear to Auscultation, No Rales, No Rhonchi, No Wheezes   Cardiovascular: S1, S2, Regular Rate and Rhythm   Gastrointestinal: Soft, Nontender, Nondistended   Extremities: No calf tenderness   Incision: CDI    ICU Vital Signs Last 24 Hrs  T(C): 37 (07 Mar 2023 22:05), Max: 37.2 (07 Mar 2023 05:33)  T(F): 98.6 (07 Mar 2023 22:05), Max: 99 (07 Mar 2023 05:33)  HR: 62 (07 Mar 2023 22:00) (61 - 92)  BP: 96/54 (07 Mar 2023 22:00) (96/54 - 123/77)  BP(mean): 69 (07 Mar 2023 22:00) (68 - 95)  RR: 16 (07 Mar 2023 22:00) (16 - 19)  SpO2: 95% (07 Mar 2023 22:00) (94% - 97%)      03-06-23 @ 07:01  -  03-07-23 @ 07:00  --------------------------------------------------------  IN: 2300 mL / OUT: 2225 mL / NET: 75 mL    03-07-23 @ 07:01  -  03-07-23 @ 22:49  --------------------------------------------------------  IN: 900 mL / OUT: 900 mL / NET: 0 mL      bisacodyl 5 milliGRAM(s) Oral every 12 hours PRN  brivaracetam 50 milliGRAM(s) Oral every 12 hours  chlorhexidine 2% Cloths 1 Application(s) Topical <User Schedule>  dexAMETHasone     Tablet   Oral   enoxaparin Injectable 40 milliGRAM(s) SubCutaneous <User Schedule>  influenza   Vaccine 0.5 milliLiter(s) IntraMuscular once  niMODipine 30 milliGRAM(s) Oral every 2 hours  ondansetron Injectable 4 milliGRAM(s) IV Push every 6 hours PRN  polyethylene glycol 3350 17 Gram(s) Oral daily  senna 2 Tablet(s) Oral at bedtime  traMADol 100 milliGRAM(s) Oral every 6 hours      LABS:  Na: 138 (03-07 @ 15:26), 133 (03-07 @ 05:27), 138 (03-06 @ 05:30), 138 (03-05 @ 05:50)  K: 4.7 (03-07 @ 15:26), 4.9 (03-07 @ 05:27), 3.9 (03-06 @ 05:30), 4.0 (03-05 @ 05:50)  Cl: 102 (03-07 @ 15:26), 99 (03-07 @ 05:27), 101 (03-06 @ 05:30), 103 (03-05 @ 05:50)  CO2: 26 (03-07 @ 15:26), 25 (03-07 @ 05:27), 28 (03-06 @ 05:30), 27 (03-05 @ 05:50)  BUN: 14 (03-07 @ 15:26), 13 (03-07 @ 05:27), 13 (03-06 @ 05:30), 13 (03-05 @ 05:50)  Cr: 0.69 (03-07 @ 15:26), 0.73 (03-07 @ 05:27), 0.80 (03-06 @ 05:30), 0.77 (03-05 @ 05:50)  Glu: 143(03-07 @ 15:26), 130(03-07 @ 05:27), 113(03-06 @ 05:30), 103(03-05 @ 05:50)    Hgb: 13.1 (03-07 @ 05:27), 12.0 (03-06 @ 05:30), 11.4 (03-05 @ 05:50)  Hct: 38.3 (03-07 @ 05:27), 34.9 (03-06 @ 05:30), 33.8 (03-05 @ 05:50)  WBC: 11.12 (03-07 @ 05:27), 10.91 (03-06 @ 05:30), 12.08 (03-05 @ 05:50)  Plt: 246 (03-07 @ 05:27), 233 (03-06 @ 05:30), 213 (03-05 @ 05:50)                 INTERVAL HISTORY: HPI:  Patient is a 33yo M with PMHx of anxiety who originally presented to [facility] with severe headache, nausea with emesis x 1 and diaphoresis and was found to have a right 6mm ICA aneurysm on CTA. Patient reports that this morning he woke up with a severe headache that was accompanied by an episode of nausea and vomiting. He also endorses that the headache was so severe he became extremely anxious, felt as if he was having a panic attack and became diaphoretic. He states that headaches are controlled from the pain medicine he received while at [place] and did not have any additional episodes of nausea/vomiting. Pending further workup with repeat CTH/CTA and possible cerebral angiogram     Denies visual changes, dizziness, cigarette smoking, family hx of brain aneurysm, previous recurrent headaches, changes in sensation, mental status, unilateral weakness, confusion.    GCS 15, MF0,  HH 1 (27 Feb 2023 23:14)    Drug Dosing Weight  Height (cm): 177.8 (03 Mar 2023 08:15)  Weight (kg): 95 (03 Mar 2023 08:15)  BMI (kg/m2): 30.1 (03 Mar 2023 08:15)  BSA (m2): 2.13 (03 Mar 2023 08:15)    PAST MEDICAL & SURGICAL HISTORY:  Anxiety      No significant past surgical history          REVIEW OF SYSTEMS: [ ] Unable to Assess due to neurologic exam   [x] All ROS addressed below are non-contributory, except:  Neuro: [] Headache [ ] Back pain [ ] Numbness [ ] Weakness [ ] Ataxia [ ] Dizziness [ ] Aphasia [ ] Dysarthria [ ] photosensitivity  Resp: [ ] Shortness of breath/dyspnea, [ ] Orthopnea [ ] Cough  CV: [ ] Chest pain [ ] Palpitation [ ] Lightheadedness [ ] Syncope  Renal: [ ] Thirst [ ] Edema  GI: [ ] Nausea [ ] Emesis [ ] Abdominal pain [ ] Constipation [ ] Diarrhea  Hem: [ ] Hematemesis [ ] bright red blood per rectum  ID: [ ] Fever [ ] Chills [ ] Dysuria  ENT: [ ] Rhinorrhea    PHYSICAL EXAM:    General: No Acute Distress   Neurological: left eye esotropia, Awake, alert oriented to person, place and time, Following Commands, PERRL, EOMI, Face Symmetrical, Speech Fluent, Moving all extremities, Muscle Strength normal in all four extremities, No Drift, Sensation to Light Touch Intact  Pulmonary: Clear to Auscultation, No Rales, No Rhonchi, No Wheezes   Cardiovascular: S1, S2, Regular Rate and Rhythm   Gastrointestinal: Soft, Nontender, Nondistended   Extremities: No calf tenderness   Incision: CDI    ICU Vital Signs Last 24 Hrs  T(C): 37 (07 Mar 2023 22:05), Max: 37.2 (07 Mar 2023 05:33)  T(F): 98.6 (07 Mar 2023 22:05), Max: 99 (07 Mar 2023 05:33)  HR: 62 (07 Mar 2023 22:00) (61 - 92)  BP: 96/54 (07 Mar 2023 22:00) (96/54 - 123/77)  BP(mean): 69 (07 Mar 2023 22:00) (68 - 95)  RR: 16 (07 Mar 2023 22:00) (16 - 19)  SpO2: 95% (07 Mar 2023 22:00) (94% - 97%)      03-06-23 @ 07:01  -  03-07-23 @ 07:00  --------------------------------------------------------  IN: 2300 mL / OUT: 2225 mL / NET: 75 mL    03-07-23 @ 07:01  -  03-07-23 @ 22:49  --------------------------------------------------------  IN: 900 mL / OUT: 900 mL / NET: 0 mL      bisacodyl 5 milliGRAM(s) Oral every 12 hours PRN  brivaracetam 50 milliGRAM(s) Oral every 12 hours  chlorhexidine 2% Cloths 1 Application(s) Topical <User Schedule>  dexAMETHasone     Tablet   Oral   enoxaparin Injectable 40 milliGRAM(s) SubCutaneous <User Schedule>  influenza   Vaccine 0.5 milliLiter(s) IntraMuscular once  niMODipine 30 milliGRAM(s) Oral every 2 hours  ondansetron Injectable 4 milliGRAM(s) IV Push every 6 hours PRN  polyethylene glycol 3350 17 Gram(s) Oral daily  senna 2 Tablet(s) Oral at bedtime  traMADol 100 milliGRAM(s) Oral every 6 hours      LABS:  Na: 138 (03-07 @ 15:26), 133 (03-07 @ 05:27), 138 (03-06 @ 05:30), 138 (03-05 @ 05:50)  K: 4.7 (03-07 @ 15:26), 4.9 (03-07 @ 05:27), 3.9 (03-06 @ 05:30), 4.0 (03-05 @ 05:50)  Cl: 102 (03-07 @ 15:26), 99 (03-07 @ 05:27), 101 (03-06 @ 05:30), 103 (03-05 @ 05:50)  CO2: 26 (03-07 @ 15:26), 25 (03-07 @ 05:27), 28 (03-06 @ 05:30), 27 (03-05 @ 05:50)  BUN: 14 (03-07 @ 15:26), 13 (03-07 @ 05:27), 13 (03-06 @ 05:30), 13 (03-05 @ 05:50)  Cr: 0.69 (03-07 @ 15:26), 0.73 (03-07 @ 05:27), 0.80 (03-06 @ 05:30), 0.77 (03-05 @ 05:50)  Glu: 143(03-07 @ 15:26), 130(03-07 @ 05:27), 113(03-06 @ 05:30), 103(03-05 @ 05:50)    Hgb: 13.1 (03-07 @ 05:27), 12.0 (03-06 @ 05:30), 11.4 (03-05 @ 05:50)  Hct: 38.3 (03-07 @ 05:27), 34.9 (03-06 @ 05:30), 33.8 (03-05 @ 05:50)  WBC: 11.12 (03-07 @ 05:27), 10.91 (03-06 @ 05:30), 12.08 (03-05 @ 05:50)  Plt: 246 (03-07 @ 05:27), 233 (03-06 @ 05:30), 213 (03-05 @ 05:50)

## 2023-03-07 NOTE — PROGRESS NOTE ADULT - SUBJECTIVE AND OBJECTIVE BOX
HPI:  Patient is a 33yo M with PMHx of anxiety who originally presented to [facility] with severe headache, nausea with emesis x 1 and diaphoresis and was found to have a right 6mm ICA aneurysm on CTA. Patient reports that this morning he woke up with a severe headache that was accompanied by an episode of nausea and vomiting. He also endorses that the headache was so severe he became extremely anxious, felt as if he was having a panic attack and became diaphoretic. He states that headaches are controlled from the pain medicine he received while at [place] and did not have any additional episodes of nausea/vomiting. Pending further workup with repeat CTH/CTA and possible cerebral angiogram     Denies visual changes, dizziness, cigarette smoking, family hx of brain aneurysm, previous recurrent headaches, changes in sensation, mental status, unilateral weakness, confusion.        S/Overnight events:  NICK overnight, started on headache cocktail and decadron taper. Started back on IVF for poor PO intake, can d/c once tolerating PO. Denies nausea this evening.        Hospital Course:   2/28: Admitted for further workup, CTH/CTA obtained, LP done, r/o xanthochromia, traumatic LP resulting in blood in all tubes. POD0 s/p cerebral angiogram with findings of 7.8mm R Pcomm aneurysm. Plan for OR in the morning for clipping of aneurysm.   3/1: NICK o/n neuro stable. POD0 R crani for R pcomm aneurysm clipping c/b intraoperative aneurysm rupture, EBL 1L. post op CTH stable. remains intubated post op sedated on propofol/precedex. on thanh gtt. hypotensive 80s, salma 30s, given 1L NS and 1mg atropine. sedation held, thanh changed to levo gtt. restarted on propofol for sedation  3/2: BD4. RBD2. POD1. neuro stable. Propofol overnight while intubated. CTH stable post-op, CTA head and neck shows clipping of R pcomm aneurysm, lack of visualization of R pcomm and R P1 segment possibly secondary to vasospasm, will correlate clinically. Pending formal angiogram tomorrow. Extubated this am. Voiding.   3/3: BD5, RBD2. POD2. NICK o/n neuro stable. 500cc bolus NS given in AM for euvolemia. POD0 angiogram showing complete clip ligation of R pcomm aneurym, no residual. Liberalized SBP goal to 120-180, given 500cc NS bolus.   3/4: BD 6, POD 3 crani for clipping and POD 1 angio. NICK o/n. 1.5L NS bolus for euvolemia. ALONA with a lot of resistance during removal attempt, fellow at bedside, removed staples to open up incision and visualize the drain, drain was removed and 4 horizontal mattress sutures were placed. Started on ancef x  48 hours, given albumin for volume status. Given lactulose and had bowel movement. SQL started this evening  3/5: BD7, POD 4 crani/clipping, POD2 dx angio. NICK overnight. Pain well controlled, no complaints at this time. Headaches controlled. SBP goal 100-180, o/n SBP dipped to 90s, asymptomatic, neuro exam remains intact, nimodipine if BP and/or HR allows. Platelets down in evening but normalized with AM labs. Pending CTA BD10. Anxiety issues overnight, keppra switched to briviact. Had episode of nausea and diaphoresis while working with OT, vital signs stable throughout, given zofran. Improved with resting and lying down.   3/6: BD8, POD5 crani/clipping, POD3. Given 1L LR bolus for euvolemia goal. Given 0.5mg dilaudid IV x1 for breakthrough headache, remains neuro intact. SBP goal 100-180. Pend CTH BD 10 (3/8). 500cc LR bolus given for euvolemia goal.   3/7: BD9, POD6 crani/clipping R PCOMM aneurysm, POD4 s/p angio. NICK overnight, started on headache cocktail and decadron taper. Started back on IVF for poor PO intake, can d/c once tolerating PO. Pend CTA BD#10, pend echo. Cont Briviact, neuro/vitals q1hr.      Vital Signs Last 24 Hrs  T(C): 36.6 (07 Mar 2023 01:39), Max: 37.4 (06 Mar 2023 14:07)  T(F): 97.9 (07 Mar 2023 01:39), Max: 99.3 (06 Mar 2023 14:07)  HR: 74 (07 Mar 2023 02:00) (60 - 76)  BP: 117/64 (07 Mar 2023 02:00) (102/55 - 117/66)  BP(mean): 82 (07 Mar 2023 02:00) (72 - 86)  RR: 16 (07 Mar 2023 02:00) (16 - 18)  SpO2: 94% (07 Mar 2023 02:00) (93% - 100%)    Parameters below as of 07 Mar 2023 02:00  Patient On (Oxygen Delivery Method): room air        I&O's Detail    05 Mar 2023 07:01  -  06 Mar 2023 07:00  --------------------------------------------------------  IN:    IV PiggyBack: 50 mL    Lactated Ringers Bolus: 1000 mL    Oral Fluid: 100 mL  Total IN: 1150 mL    OUT:    Voided (mL): 1500 mL  Total OUT: 1500 mL    Total NET: -350 mL      06 Mar 2023 07:01  -  07 Mar 2023 02:37  --------------------------------------------------------  IN:    IV PiggyBack: 100 mL    Lactated Ringers Bolus: 500 mL    Oral Fluid: 600 mL    sodium chloride 0.9%: 600 mL  Total IN: 1800 mL    OUT:    Voided (mL): 1625 mL  Total OUT: 1625 mL    Total NET: 175 mL        I&O's Summary    05 Mar 2023 07:01  -  06 Mar 2023 07:00  --------------------------------------------------------  IN: 1150 mL / OUT: 1500 mL / NET: -350 mL    06 Mar 2023 07:01  -  07 Mar 2023 02:37  --------------------------------------------------------  IN: 1800 mL / OUT: 1625 mL / NET: 175 mL        PHYSICAL EXAM:  General: NAD, pt is comfortably sitting up in bed, A&O x3, on RA  HEENT: CN II-XII grossly intact, PERRL 3mm, EOMI b/l, face symmetric, tongue midline, neck FROM  Cardiovascular: RRR, normal S1 and S2   Respiratory: lungs CTAB, no wheezing, rhonchi, or crackles   GI: normoactive BS to auscultation, abd soft, NTND   Neuro: no aphasia, speech clear, no dysmetria, no pronator drift  strength 5/5 throughout all 4 extremities  sensation intact to light touch throughout   Extremities: distal pulses 2+ x4   Wound/incision: staple and sutures in-place along R crani incision site     TUBES/LINES:  [] CVC  [] A-line  [] Lumbar Drain  [] Ventriculostomy  [] Other    DIET:  [] NPO  [X] Mechanical  [] Tube feeds        LABS:      CAPILLARY BLOOD GLUCOSE      Drug Levels: [] N/A    CSF Analysis: [] N/A      Allergies    No Known Allergies    Intolerances      MEDICATIONS:  Antibiotics:    Neuro:  acetaminophen 300 mG/butalbital 50 mG/ caffeine 40 mG 1 Capsule(s) Oral every 6 hours  brivaracetam 50 milliGRAM(s) Oral every 12 hours  ondansetron Injectable 4 milliGRAM(s) IV Push every 6 hours PRN  traMADol 100 milliGRAM(s) Oral every 6 hours    Anticoagulation:  enoxaparin Injectable 40 milliGRAM(s) SubCutaneous <User Schedule>    OTHER:  bisacodyl 5 milliGRAM(s) Oral every 12 hours PRN  chlorhexidine 2% Cloths 1 Application(s) Topical <User Schedule>  dexAMETHasone     Tablet 4 milliGRAM(s) Oral every 6 hours  dexAMETHasone     Tablet   Oral   influenza   Vaccine 0.5 milliLiter(s) IntraMuscular once  niMODipine 30 milliGRAM(s) Oral every 2 hours  polyethylene glycol 3350 17 Gram(s) Oral daily  senna 2 Tablet(s) Oral at bedtime    IVF:  sodium chloride 0.9%. 1000 milliLiter(s) IV Continuous <Continuous>    CULTURES:  Culture Results:   No growth to date (02-28 @ 05:49)    RADIOLOGY & ADDITIONAL TESTS:      ASSESSMENT:  33 y/o male with h/o of anxiety transferred from [facility] with HA s/p angiogram with findings of R Pcomm aneurysm 2/28. s/p R pteronial crani for R PCOMM clipping w/ intraoperative aneurysm rupture (3/1/23). Now s/p angio showing no residual aneurysm (3/3/23).       ANEURYSM    No pertinent family history in first degree relatives    Handoff    MEWS Score    Anxiety    Cerebral aneurysm    Cerebral aneurysm    Unruptured cerebral aneurysm    Anxiety    Pre-op evaluation    Leukocytosis    Lumbar puncture    Angiogram, carotid and cerebral, bilateral    Craniotomy, for aneurysm repair    No significant past surgical history    Room Service Assist    [email or web address]        PLAN:  NEURO   - Neuro/vitals q1 hours   - Pain control - tramadol PRN   - Briviact 50 mg BID   - CTA 3/2: possible PCOMM vasospasm, CTA BD10 3/8   - Angio 3/3 negative for spasm, complete obliteration of aneurysm   - Nimodipine 30 mg q 2hrs until BD21 (hold parameters for hypotension and bradycardia)   - s/p LP on admission: traumatic tap, inconclusive for SAH     PULM   - RA, Encourage IS     CARDIO  - -180   - Echo pending from admission     GI  - Regular diet, poor PO intake   - Bowel regimen   - Last BM 3/4  - Zofran prn nausea     ENDO  - ISS dc'd, A1c 5.3     RENAL  - IVF NS @100cc/hr   - Voiding   - Euvolemia goal    HEME  - SCDs, SQL     ID  - Afebrile     Dispo:   - ICU status, full code, dispo pending   - PT/OT home no needs when medically ready     Assessment and plan discussed with Dr. Wiseman and Dr. Paz

## 2023-03-07 NOTE — PROGRESS NOTE ADULT - ASSESSMENT
Assessment: 31 y/o M found to have ruptured R PCOMM s/p DSA w/o intervention now s/p crani & clipping c/b intraop re-rupture.  BD 9    NEURO:  Neurochecks  Q1  Seizure ppx: briviact 50mg BID per NSGY. Consider DC  DCI management- serial TCDs, CTA 3/9 (10 days from sentinal headache).  Continue Nimodipine with hold parameters (for HR, BP) for total of 21days    Analgesia: HA cocktail (decadron, tramadol, Mg, fioricet)  Activity: Ambulate as tolerated. PT/OT.     PULMONARY:  Saturating well on RA    CARDIOVASCULAR: Bradycardia episodes s/p atropine- resolved. Now normal sinus rhythm  Monitor on telemetry   Vitals Q1  SBP goal 100-180  TTE: pending    GASTROENTEROLOGY:   LBM 3/4  Regular diet    RENAL/: Mild hyponatremia  Monitor BMPs  Strict Is/Os; goal euvolemia  Na goal 135-145    ENDOCRINE:  Blood glucose goal 140-180  A1c 5.3    HEME/ONC:  DVT ppx: SQL  Anti Xa- 3/8:   B/L SCDs    INFECTIOUS: Mild stable leukocytosis  Monitor for fevers, signs of infection    MISC:    SOCIAL/FAMILY:  [] awaiting [x] updated at bedside [] family meeting    CODE STATUS:  [x] Full Code [] DNR [] DNI [] Palliative/Comfort Care    DISPOSITION:  [x] ICU [] Stroke Unit [] Floor [] EMU [] RCU [] PCU    Time spent: 45 critical care minutes   Assessment: 33 y/o M found to have ruptured R PCOMM s/p DSA w/o intervention now s/p crani & clipping c/b intraop re-rupture.  BD 9    NEURO:  Neurochecks  Q1  Seizure ppx: briviact 50mg BID per NSGY. Consider DC  DCI management- serial TCDs, CTA 3/9 (10 days from sentinal headache).  Continue Nimodipine with hold parameters (for HR, BP) for total of 21days    Analgesia: HA cocktail (decadron, tramadol, Mg, fioricet)  Activity: Ambulate as tolerated. PT/OT.     PULMONARY:  Saturating well on RA    CARDIOVASCULAR: Bradycardia episodes s/p atropine- resolved. Now normal sinus rhythm  Monitor on telemetry   Vitals Q1  SBP goal 100-180  TTE: pending    GASTROENTEROLOGY:   LBM 3/4  Regular diet    RENAL/: Mild hyponatremia  S/P 3% bolus. Monitor BMPs  Strict Is/Os; goal euvolemia  Na goal 135-145    ENDOCRINE:  Blood glucose goal 140-180  A1c 5.3    HEME/ONC:  DVT ppx: SQL  Anti Xa- 3/8:   B/L SCDs    INFECTIOUS: Mild stable leukocytosis  Monitor for fevers, signs of infection    MISC:    SOCIAL/FAMILY:  [] awaiting [x] updated at bedside [] family meeting    CODE STATUS:  [x] Full Code [] DNR [] DNI [] Palliative/Comfort Care    DISPOSITION:  [x] ICU [] Stroke Unit [] Floor [] EMU [] RCU [] PCU    Time spent: 45 critical care minutes   Assessment: 31 y/o M found to have ruptured R PCOMM s/p DSA w/o intervention now s/p crani & clipping c/b intraop re-rupture.  BD 9    NEURO:  Neurochecks  Q1  Seizure ppx: briviact 50mg BID per NSGY. Consider DC  DCI management- serial TCDs, CTA 3/8  (BD 10).  Continue Nimodipine with hold parameters (for HR, BP) for total of 21days    Analgesia: HA cocktail (decadron, tramadol, Mg, fioricet)  Activity: Ambulate as tolerated. PT/OT.     PULMONARY:  Saturating well on RA  Incentive spirometry    CARDIOVASCULAR: Bradycardia episodes s/p atropine- resolved. Now normal sinus rhythm  Monitor on telemetry   Vitals Q1  SBP goal 100-180  TTE: pending    GASTROENTEROLOGY:   LBM 3/4  Regular diet  Bowel regimen- pt refuses    RENAL/: Mild hyponatremia  S/P 3% bolus. Monitor BMPs  Strict Is/Os; goal euvolemia  Na goal 135-145    ENDOCRINE:  Blood glucose goal 140-180  A1c 5.3    HEME/ONC:  DVT ppx: SQL  Anti Xa- 3/8:   B/L SCDs    INFECTIOUS: Mild stable leukocytosis  Monitor for fevers, signs of infection    MISC:    SOCIAL/FAMILY:  [] awaiting [x] updated at bedside [] family meeting    CODE STATUS:  [x] Full Code [] DNR [] DNI [] Palliative/Comfort Care    DISPOSITION:  [x] ICU [] Stroke Unit [] Floor [] EMU [] RCU [] PCU    Time spent: 45 critical care minutes

## 2023-03-08 LAB
ANION GAP SERPL CALC-SCNC: 9 MMOL/L — SIGNIFICANT CHANGE UP (ref 5–17)
BUN SERPL-MCNC: 16 MG/DL — SIGNIFICANT CHANGE UP (ref 7–23)
CALCIUM SERPL-MCNC: 9.7 MG/DL — SIGNIFICANT CHANGE UP (ref 8.4–10.5)
CHLORIDE SERPL-SCNC: 100 MMOL/L — SIGNIFICANT CHANGE UP (ref 96–108)
CO2 SERPL-SCNC: 27 MMOL/L — SIGNIFICANT CHANGE UP (ref 22–31)
CREAT SERPL-MCNC: 0.72 MG/DL — SIGNIFICANT CHANGE UP (ref 0.5–1.3)
EGFR: 124 ML/MIN/1.73M2 — SIGNIFICANT CHANGE UP
GLUCOSE SERPL-MCNC: 151 MG/DL — HIGH (ref 70–99)
HCT VFR BLD CALC: 35.9 % — LOW (ref 39–50)
HGB BLD-MCNC: 12.5 G/DL — LOW (ref 13–17)
MAGNESIUM SERPL-MCNC: 1.6 MG/DL — SIGNIFICANT CHANGE UP (ref 1.6–2.6)
MCHC RBC-ENTMCNC: 31.9 PG — SIGNIFICANT CHANGE UP (ref 27–34)
MCHC RBC-ENTMCNC: 34.8 GM/DL — SIGNIFICANT CHANGE UP (ref 32–36)
MCV RBC AUTO: 91.6 FL — SIGNIFICANT CHANGE UP (ref 80–100)
NRBC # BLD: 0 /100 WBCS — SIGNIFICANT CHANGE UP (ref 0–0)
PHOSPHATE SERPL-MCNC: 2.7 MG/DL — SIGNIFICANT CHANGE UP (ref 2.5–4.5)
PLATELET # BLD AUTO: 286 K/UL — SIGNIFICANT CHANGE UP (ref 150–400)
POTASSIUM SERPL-MCNC: 4.6 MMOL/L — SIGNIFICANT CHANGE UP (ref 3.5–5.3)
POTASSIUM SERPL-SCNC: 4.6 MMOL/L — SIGNIFICANT CHANGE UP (ref 3.5–5.3)
RBC # BLD: 3.92 M/UL — LOW (ref 4.2–5.8)
RBC # FLD: 13.6 % — SIGNIFICANT CHANGE UP (ref 10.3–14.5)
SODIUM SERPL-SCNC: 136 MMOL/L — SIGNIFICANT CHANGE UP (ref 135–145)
UFH PPP CHRO-ACNC: 0.16 IU/ML — LOW (ref 0.3–0.7)
WBC # BLD: 17.93 K/UL — HIGH (ref 3.8–10.5)
WBC # FLD AUTO: 17.93 K/UL — HIGH (ref 3.8–10.5)

## 2023-03-08 PROCEDURE — 70496 CT ANGIOGRAPHY HEAD: CPT | Mod: 26

## 2023-03-08 PROCEDURE — 99291 CRITICAL CARE FIRST HOUR: CPT

## 2023-03-08 RX ORDER — ENOXAPARIN SODIUM 100 MG/ML
50 INJECTION SUBCUTANEOUS
Refills: 0 | Status: DISCONTINUED | OUTPATIENT
Start: 2023-03-08 | End: 2023-03-10

## 2023-03-08 RX ORDER — SODIUM CHLORIDE 9 MG/ML
500 INJECTION INTRAMUSCULAR; INTRAVENOUS; SUBCUTANEOUS ONCE
Refills: 0 | Status: COMPLETED | OUTPATIENT
Start: 2023-03-08 | End: 2023-03-08

## 2023-03-08 RX ORDER — SODIUM,POTASSIUM PHOSPHATES 278-250MG
1 POWDER IN PACKET (EA) ORAL ONCE
Refills: 0 | Status: COMPLETED | OUTPATIENT
Start: 2023-03-08 | End: 2023-03-08

## 2023-03-08 RX ORDER — NIMODIPINE 60 MG/10ML
30 SOLUTION ORAL
Refills: 0 | Status: DISCONTINUED | OUTPATIENT
Start: 2023-03-08 | End: 2023-03-10

## 2023-03-08 RX ORDER — POLYETHYLENE GLYCOL 3350 17 G/17G
17 POWDER, FOR SOLUTION ORAL
Refills: 0 | Status: DISCONTINUED | OUTPATIENT
Start: 2023-03-08 | End: 2023-03-10

## 2023-03-08 RX ORDER — PANTOPRAZOLE SODIUM 20 MG/1
40 TABLET, DELAYED RELEASE ORAL
Refills: 0 | Status: DISCONTINUED | OUTPATIENT
Start: 2023-03-08 | End: 2023-03-10

## 2023-03-08 RX ORDER — TRAMADOL HYDROCHLORIDE 50 MG/1
100 TABLET ORAL EVERY 6 HOURS
Refills: 0 | Status: DISCONTINUED | OUTPATIENT
Start: 2023-03-08 | End: 2023-03-09

## 2023-03-08 RX ORDER — MAGNESIUM SULFATE 500 MG/ML
2 VIAL (ML) INJECTION ONCE
Refills: 0 | Status: COMPLETED | OUTPATIENT
Start: 2023-03-08 | End: 2023-03-08

## 2023-03-08 RX ADMIN — NIMODIPINE 30 MILLIGRAM(S): 60 SOLUTION ORAL at 08:54

## 2023-03-08 RX ADMIN — BRIVARACETAM 50 MILLIGRAM(S): 25 TABLET, FILM COATED ORAL at 05:51

## 2023-03-08 RX ADMIN — Medication 1 CAPSULE(S): at 05:52

## 2023-03-08 RX ADMIN — Medication 3 MILLIGRAM(S): at 13:41

## 2023-03-08 RX ADMIN — SENNA PLUS 2 TABLET(S): 8.6 TABLET ORAL at 21:25

## 2023-03-08 RX ADMIN — TRAMADOL HYDROCHLORIDE 100 MILLIGRAM(S): 50 TABLET ORAL at 17:28

## 2023-03-08 RX ADMIN — TRAMADOL HYDROCHLORIDE 100 MILLIGRAM(S): 50 TABLET ORAL at 05:51

## 2023-03-08 RX ADMIN — BRIVARACETAM 50 MILLIGRAM(S): 25 TABLET, FILM COATED ORAL at 17:28

## 2023-03-08 RX ADMIN — TRAMADOL HYDROCHLORIDE 100 MILLIGRAM(S): 50 TABLET ORAL at 06:00

## 2023-03-08 RX ADMIN — Medication 1 PACKET(S): at 07:48

## 2023-03-08 RX ADMIN — NIMODIPINE 30 MILLIGRAM(S): 60 SOLUTION ORAL at 15:46

## 2023-03-08 RX ADMIN — Medication 3 MILLIGRAM(S): at 05:51

## 2023-03-08 RX ADMIN — TRAMADOL HYDROCHLORIDE 100 MILLIGRAM(S): 50 TABLET ORAL at 18:00

## 2023-03-08 RX ADMIN — TRAMADOL HYDROCHLORIDE 100 MILLIGRAM(S): 50 TABLET ORAL at 11:43

## 2023-03-08 RX ADMIN — Medication 25 GRAM(S): at 07:48

## 2023-03-08 RX ADMIN — SODIUM CHLORIDE 75 MILLILITER(S): 9 INJECTION INTRAMUSCULAR; INTRAVENOUS; SUBCUTANEOUS at 13:42

## 2023-03-08 RX ADMIN — POLYETHYLENE GLYCOL 3350 17 GRAM(S): 17 POWDER, FOR SOLUTION ORAL at 17:27

## 2023-03-08 RX ADMIN — TRAMADOL HYDROCHLORIDE 100 MILLIGRAM(S): 50 TABLET ORAL at 12:12

## 2023-03-08 RX ADMIN — Medication 3 MILLIGRAM(S): at 21:26

## 2023-03-08 RX ADMIN — SODIUM CHLORIDE 500 MILLILITER(S): 9 INJECTION INTRAMUSCULAR; INTRAVENOUS; SUBCUTANEOUS at 13:54

## 2023-03-08 RX ADMIN — Medication 1 CAPSULE(S): at 07:07

## 2023-03-08 RX ADMIN — ENOXAPARIN SODIUM 50 MILLIGRAM(S): 100 INJECTION SUBCUTANEOUS at 21:25

## 2023-03-08 NOTE — PROGRESS NOTE ADULT - SUBJECTIVE AND OBJECTIVE BOX
INTERVAL HISTORY: HPI:  Patient is a 33yo M with PMHx of anxiety who originally presented to [facility] with severe headache, nausea with emesis x 1 and diaphoresis and was found to have a right 6mm ICA aneurysm on CTA. Patient reports that this morning he woke up with a severe headache that was accompanied by an episode of nausea and vomiting. He also endorses that the headache was so severe he became extremely anxious, felt as if he was having a panic attack and became diaphoretic. He states that headaches are controlled from the pain medicine he received while at [place] and did not have any additional episodes of nausea/vomiting. Pending further workup with repeat CTH/CTA and possible cerebral angiogram     Denies visual changes, dizziness, cigarette smoking, family hx of brain aneurysm, previous recurrent headaches, changes in sensation, mental status, unilateral weakness, confusion.    GCS 15, MF0,  HH 1 (27 Feb 2023 23:14)    Drug Dosing Weight  Height (cm): 177.8 (03 Mar 2023 08:15)  Weight (kg): 95 (03 Mar 2023 08:15)  BMI (kg/m2): 30.1 (03 Mar 2023 08:15)  BSA (m2): 2.13 (03 Mar 2023 08:15)    PAST MEDICAL & SURGICAL HISTORY:  Anxiety  No significant past surgical history          REVIEW OF SYSTEMS: [ ] Unable to Assess due to neurologic exam   [x] All ROS addressed below are non-contributory, except:  Neuro: [] Headache [ ] Back pain [ ] Numbness [ ] Weakness [ ] Ataxia [ ] Dizziness [ ] Aphasia [ ] Dysarthria [ ] photosensitivity  Resp: [ ] Shortness of breath/dyspnea, [ ] Orthopnea [ ] Cough  CV: [ ] Chest pain [ ] Palpitation [ ] Lightheadedness [ ] Syncope  Renal: [ ] Thirst [ ] Edema  GI: [ ] Nausea [ ] Emesis [ ] Abdominal pain [ ] Constipation [ ] Diarrhea  Hem: [ ] Hematemesis [ ] bright red blood per rectum  ID: [ ] Fever [ ] Chills [ ] Dysuria  ENT: [ ] Rhinorrhea    PHYSICAL EXAM:    General: No Acute Distress   Neurological: left eye esotropia, Awake, alert oriented to person, place and time, Following Commands, PERRL, EOMI, Face Symmetrical, Speech Fluent, Moving all extremities, Muscle Strength normal in all four extremities, No Drift, Sensation to Light Touch Intact  Pulmonary: Clear to Auscultation, No Rales, No Rhonchi, No Wheezes   Cardiovascular: S1, S2, Regular Rate and Rhythm   Gastrointestinal: Soft, Nontender, Nondistended   Extremities: No calf tenderness   Incision: CDI            ICU Vital Signs Last 24 Hrs  T(C): 37.2 (08 Mar 2023 21:25), Max: 37.2 (08 Mar 2023 17:45)  T(F): 98.9 (08 Mar 2023 21:25), Max: 98.9 (08 Mar 2023 17:45)  HR: 62 (08 Mar 2023 23:00) (58 - 80)  BP: 99/52 (08 Mar 2023 23:00) (91/55 - 119/70)  BP(mean): 70 (08 Mar 2023 23:00) (63 - 90)  ABP: --  ABP(mean): --  RR: 18 (08 Mar 2023 23:00) (16 - 18)  SpO2: 97% (08 Mar 2023 23:00) (93% - 98%)      03-07-23 @ 07:01  -  03-08-23 @ 07:00  --------------------------------------------------------  IN: 1415 mL / OUT: 1500 mL / NET: -85 mL    03-08-23 @ 07:01  -  03-08-23 @ 23:19  --------------------------------------------------------  IN: 1950 mL / OUT: 1025 mL / NET: 925 mL            acetaminophen 300 mG/butalbital 50 mG/ caffeine 40 mG 1 Capsule(s) Oral every 6 hours PRN  bisacodyl 5 milliGRAM(s) Oral every 12 hours PRN  brivaracetam 50 milliGRAM(s) Oral every 12 hours  chlorhexidine 2% Cloths 1 Application(s) Topical <User Schedule>  dexAMETHasone     Tablet   Oral   enoxaparin Injectable 50 milliGRAM(s) SubCutaneous <User Schedule>  influenza   Vaccine 0.5 milliLiter(s) IntraMuscular once  niMODipine 30 milliGRAM(s) Oral every 2 hours  ondansetron Injectable 4 milliGRAM(s) IV Push every 6 hours PRN  pantoprazole    Tablet 40 milliGRAM(s) Oral before breakfast  polyethylene glycol 3350 17 Gram(s) Oral two times a day  senna 2 Tablet(s) Oral at bedtime  traMADol 100 milliGRAM(s) Oral every 6 hours      LABS:  Na: 136 (03-08 @ 00:48), 138 (03-07 @ 15:26), 133 (03-07 @ 05:27), 138 (03-06 @ 05:30)  K: 4.6 (03-08 @ 00:48), 4.7 (03-07 @ 15:26), 4.9 (03-07 @ 05:27), 3.9 (03-06 @ 05:30)  Cl: 100 (03-08 @ 00:48), 102 (03-07 @ 15:26), 99 (03-07 @ 05:27), 101 (03-06 @ 05:30)  CO2: 27 (03-08 @ 00:48), 26 (03-07 @ 15:26), 25 (03-07 @ 05:27), 28 (03-06 @ 05:30)  BUN: 16 (03-08 @ 00:48), 14 (03-07 @ 15:26), 13 (03-07 @ 05:27), 13 (03-06 @ 05:30)  Cr: 0.72 (03-08 @ 00:48), 0.69 (03-07 @ 15:26), 0.73 (03-07 @ 05:27), 0.80 (03-06 @ 05:30)  Glu: 151(03-08 @ 00:48), 143(03-07 @ 15:26), 130(03-07 @ 05:27), 113(03-06 @ 05:30)    Hgb: 12.5 (03-08 @ 00:48), 13.1 (03-07 @ 05:27), 12.0 (03-06 @ 05:30)  Hct: 35.9 (03-08 @ 00:48), 38.3 (03-07 @ 05:27), 34.9 (03-06 @ 05:30)  WBC: 17.93 (03-08 @ 00:48), 11.12 (03-07 @ 05:27), 10.91 (03-06 @ 05:30)  Plt: 286 (03-08 @ 00:48), 246 (03-07 @ 05:27), 233 (03-06 @ 05:30)

## 2023-03-08 NOTE — PROGRESS NOTE ADULT - SUBJECTIVE AND OBJECTIVE BOX
INTERVAL HISTORY: HPI:  Patient is a 31 y/o M with PMHx of anxiety who originally presented to [facility] with severe headache, nausea with emesis x 1 and diaphoresis and was found to have a right 6mm ICA aneurysm on CTA. Patient reports that this morning he woke up with a severe headache that was accompanied by an episode of nausea and vomiting. He also endorses that the headache was so severe he became extremely anxious, felt as if he was having a panic attack and became diaphoretic. He states that headaches are controlled from the pain medicine he received while at [facility] and did not have any additional episodes of nausea/vomiting. Pending further workup with repeat CTH/CTA and possible cerebral angiogram     Denies visual changes, dizziness, cigarette smoking, family hx of brain aneurysm, previous recurrent headaches, changes in sensation, mental status, unilateral weakness, confusion. (27 Feb 2023 23:14)    PAST MEDICAL & SURGICAL HISTORY:  Anxiety  No significant past surgical history    REVIEW OF SYSTEMS: [ ] Unable to Assess due to neurologic exam   [x] All ROS addressed below are non-contributory, except:  Neuro: [ ] Headache [ ] Back pain [ ] Numbness [ ] Weakness [ ] Ataxia [ ] Dizziness [ ] Aphasia [ ] Dysarthria [ ] Visual disturbance  Resp: [ ] Shortness of breath/dyspnea, [ ] Orthopnea [ ] Cough  CV: [ ] Chest pain [ ] Palpitation [ ] Lightheadedness [ ] Syncope  Renal: [ ] Thirst [ ] Edema  GI: [ ] Nausea [ ] Emesis [ ] Abdominal pain [ ] Constipation [ ] Diarrhea  Hem: [ ] Hematemesis [ ] bright red blood per rectum  ID: [ ] Fever [ ] Chills [ ] Dysuria  ENT: [ ] Rhinorrhea        ICU Vital Signs Last 24 Hrs  T(C): 36.8 (08 Mar 2023 05:28), Max: 37.1 (08 Mar 2023 01:01)  T(F): 98.3 (08 Mar 2023 05:28), Max: 98.7 (08 Mar 2023 01:01)  HR: 73 (08 Mar 2023 06:00) (61 - 92)  BP: 94/52 (08 Mar 2023 06:00) (94/52 - 123/77)  BP(mean): 70 (08 Mar 2023 06:00) (63 - 95)  RR: 16 (08 Mar 2023 06:00) (16 - 19)  SpO2: 95% (08 Mar 2023 06:00) (93% - 100%)      03-06-23 @ 07:01  -  03-07-23 @ 07:00  --------------------------------------------------------  IN: 2300 mL / OUT: 2225 mL / NET: 75 mL    03-07-23 @ 07:01  -  03-08-23 @ 06:59  --------------------------------------------------------  IN: 1050 mL / OUT: 1500 mL / NET: -450 mL      MEDICATIONS:   acetaminophen 300 mG/butalbital 50 mG/ caffeine 40 mG 1 Capsule(s) Oral every 6 hours PRN  bisacodyl 5 milliGRAM(s) Oral every 12 hours PRN  brivaracetam 50 milliGRAM(s) Oral every 12 hours  chlorhexidine 2% Cloths 1 Application(s) Topical <User Schedule>  dexAMETHasone     Tablet 3 milliGRAM(s) Oral every 8 hours  dexAMETHasone     Tablet   Oral   enoxaparin Injectable 50 milliGRAM(s) SubCutaneous <User Schedule>  influenza   Vaccine 0.5 milliLiter(s) IntraMuscular once  magnesium sulfate  IVPB 2 Gram(s) IV Intermittent once  niMODipine 30 milliGRAM(s) Oral every 2 hours  ondansetron Injectable 4 milliGRAM(s) IV Push every 6 hours PRN  polyethylene glycol 3350 17 Gram(s) Oral daily  potassium phosphate / sodium phosphate Powder (PHOS-NaK) 1 Packet(s) Oral once  senna 2 Tablet(s) Oral at bedtime  sodium chloride 0.9%. 1000 milliLiter(s) (75 mL/Hr) IV Continuous <Continuous>  traMADol 100 milliGRAM(s) Oral every 6 hours    LABS:                      12.5   17.93 )-----------( 286      ( 08 Mar 2023 00:48 )             35.9     03-08    136  |  100  |  16  ----------------------------<  151<H>  4.6   |  27  |  0.72    Ca    9.7      08 Mar 2023 00:48  Phos  2.7     03-08  Mg     1.6     03-08      PHYSICAL EXAM:  General: No Acute Distress,  Neurological: Eyes open to voice, follows commands, pupils 2mm reactive, b/l UE 5/5 b/l LE 5/5   Pulmonary: Clear to Auscultation, No Rales, No Rhonchi, No Wheezes   Cardiovascular: S1, S2, Regular Rate and Rhythm   Gastrointestinal: Soft, Nontender, Nondistended   Extremities: No calf tenderness       RADIOLOGY & ADDITIONAL STUDIES:  < from: CT Angio Neck w/ IV Cont (02.28.23 @ 01:15) >  Intracranial CTA: 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    1 mm superiorly directed outpouching of the paraclinoid right ICA which   may represent origin of the ophthalmic artery versus small aneurysm..    Extracranial CTA: No steno-occlusive disease.    < end of copied text >     INTERVAL HISTORY: HPI:  Patient is a 33 y/o M with PMHx of anxiety who originally presented to [facility] with severe headache, nausea with emesis x 1 and diaphoresis and was found to have a right 6mm ICA aneurysm on CTA. Patient reports that this morning he woke up with a severe headache that was accompanied by an episode of nausea and vomiting. He also endorses that the headache was so severe he became extremely anxious, felt as if he was having a panic attack and became diaphoretic. He states that headaches are controlled from the pain medicine he received while at [place] and did not have any additional episodes of nausea/vomiting. Pending further workup with repeat CTH/CTA and possible cerebral angiogram     Denies visual changes, dizziness, cigarette smoking, family hx of brain aneurysm, previous recurrent headaches, changes in sensation, mental status, unilateral weakness, confusion. (27 Feb 2023 23:14)    PAST MEDICAL & SURGICAL HISTORY:  Anxiety  No significant past surgical history    REVIEW OF SYSTEMS: [ ] Unable to Assess due to neurologic exam   [x] All ROS addressed below are non-contributory, except:  Neuro: [ ] Headache [ ] Back pain [ ] Numbness [ ] Weakness [ ] Ataxia [ ] Dizziness [ ] Aphasia [ ] Dysarthria [ ] Visual disturbance  Resp: [ ] Shortness of breath/dyspnea, [ ] Orthopnea [ ] Cough  CV: [ ] Chest pain [ ] Palpitation [ ] Lightheadedness [ ] Syncope  Renal: [ ] Thirst [ ] Edema  GI: [ ] Nausea [ ] Emesis [ ] Abdominal pain [ ] Constipation [ ] Diarrhea  Hem: [ ] Hematemesis [ ] bright red blood per rectum  ID: [ ] Fever [ ] Chills [ ] Dysuria  ENT: [ ] Rhinorrhea      ICU Vital Signs Last 24 Hrs  T(C): 36.8 (08 Mar 2023 05:28), Max: 37.1 (08 Mar 2023 01:01)  T(F): 98.3 (08 Mar 2023 05:28), Max: 98.7 (08 Mar 2023 01:01)  HR: 73 (08 Mar 2023 06:00) (61 - 92)  BP: 94/52 (08 Mar 2023 06:00) (94/52 - 123/77)  BP(mean): 70 (08 Mar 2023 06:00) (63 - 95)  RR: 16 (08 Mar 2023 06:00) (16 - 19)  SpO2: 95% (08 Mar 2023 06:00) (93% - 100%)      03-06-23 @ 07:01  -  03-07-23 @ 07:00  --------------------------------------------------------  IN: 2300 mL / OUT: 2225 mL / NET: 75 mL    03-07-23 @ 07:01  -  03-08-23 @ 06:59  --------------------------------------------------------  IN: 1050 mL / OUT: 1500 mL / NET: -450 mL      MEDICATIONS:   acetaminophen 300 mG/butalbital 50 mG/ caffeine 40 mG 1 Capsule(s) Oral every 6 hours PRN  bisacodyl 5 milliGRAM(s) Oral every 12 hours PRN  brivaracetam 50 milliGRAM(s) Oral every 12 hours  chlorhexidine 2% Cloths 1 Application(s) Topical <User Schedule>  dexAMETHasone     Tablet 3 milliGRAM(s) Oral every 8 hours  dexAMETHasone     Tablet   Oral   enoxaparin Injectable 50 milliGRAM(s) SubCutaneous <User Schedule>  influenza   Vaccine 0.5 milliLiter(s) IntraMuscular once  magnesium sulfate  IVPB 2 Gram(s) IV Intermittent once  niMODipine 30 milliGRAM(s) Oral every 2 hours  ondansetron Injectable 4 milliGRAM(s) IV Push every 6 hours PRN  polyethylene glycol 3350 17 Gram(s) Oral daily  potassium phosphate / sodium phosphate Powder (PHOS-NaK) 1 Packet(s) Oral once  senna 2 Tablet(s) Oral at bedtime  sodium chloride 0.9%. 1000 milliLiter(s) (75 mL/Hr) IV Continuous <Continuous>  traMADol 100 milliGRAM(s) Oral every 6 hours      LABS:                      12.5   17.93 )-----------( 286      ( 08 Mar 2023 00:48 )             35.9     03-08    136  |  100  |  16  ----------------------------<  151<H>  4.6   |  27  |  0.72    Ca    9.7      08 Mar 2023 00:48  Phos  2.7     03-08  Mg     1.6     03-08      PHYSICAL EXAM:  General: No Acute Distress,  Neurological: Eyes open to voice, follows commands, pupils 2mm reactive, b/l UE 5/5 b/l LE 5/5   Pulmonary: Clear to Auscultation, No Rales, No Rhonchi, No Wheezes   Cardiovascular: S1, S2, Regular Rate and Rhythm   Gastrointestinal: Soft, Nontender, Nondistended   Extremities: No calf tenderness       RADIOLOGY & ADDITIONAL STUDIES:  < from: CT Angio Neck w/ IV Cont (02.28.23 @ 01:15) >  Intracranial CTA: 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    1 mm superiorly directed outpouching of the paraclinoid right ICA which   may represent origin of the ophthalmic artery versus small aneurysm..    Extracranial CTA: No steno-occlusive disease.    < end of copied text >

## 2023-03-08 NOTE — PROGRESS NOTE ADULT - ASSESSMENT
33 y/o M found to have ruptured R PCOMM s/p DSA w/o intervention now s/p crani & clipping c/b intraop re-rupture. Bleed day 10     -c/w q1 neuro-checks   -c/w dexamethasone taper, tramadol, PRN Fioricet    -maintain euvolemia   -SBP goal 100-180  -c/w lovenox SQ   -pending admission TTE   -nimodipine as tolerated

## 2023-03-08 NOTE — PROGRESS NOTE ADULT - ASSESSMENT
Assessment: 31 y/o M found to have ruptured R PCOMM s/p DSA w/o intervention now s/p crani & clipping c/b intraop re-rupture.  BD 9    NEURO:  Neurochecks  Q1  Seizure ppx: briviact 50mg BID per NSGY. Consider DC  DCI management- serial TCDs, CTA 3/8  (BD 10).  Continue Nimodipine with hold parameters (for HR, BP) for total of 21days    Analgesia: HA cocktail (decadron, tramadol, Mg, fioricet)  Activity: Ambulate as tolerated. PT/OT.     PULMONARY:  Saturating well on RA  Incentive spirometry    CARDIOVASCULAR: Bradycardia episodes s/p atropine- resolved. Now normal sinus rhythm  Monitor on telemetry   Vitals Q1  SBP goal 100-180  TTE: pending    GASTROENTEROLOGY:   LBM 3/4  Regular diet  Bowel regimen- pt refuses    RENAL/: Mild hyponatremia  S/P 3% bolus. Monitor BMPs  Strict Is/Os; goal euvolemia  Na goal 135-145    ENDOCRINE:  Blood glucose goal 140-180  A1c 5.3    HEME/ONC:  DVT ppx: SQL  Anti Xa- 3/8:   B/L SCDs    INFECTIOUS: Mild stable leukocytosis  Monitor for fevers, signs of infection    MISC:    SOCIAL/FAMILY:  [] awaiting [x] updated at bedside [] family meeting    CODE STATUS:  [x] Full Code [] DNR [] DNI [] Palliative/Comfort Care    DISPOSITION:  [x] ICU [] Stroke Unit [] Floor [] EMU [] RCU [] PCU    Time spent: 45 critical care minutes   Assessment: 33 y/o M found to have ruptured R PCOMM s/p DSA w/o intervention now s/p crani & clipping c/b intraop re-rupture.  BD 10    NEURO:  Neurochecks Q1  Seizure ppx: briviact 50mg BID per NSGY. Consider DC.  DCI management- serial TCDs, CTA 3/8- shows some R M1 segment and R A1 segment mild to moderate vasospasm. R thalamic infarct.  Continue Nimodipine with hold parameters (for HR 55, ) for total of 21days    Analgesia: HA cocktail (decadron, tramadol, Mg, fioricet)  Activity: Ambulate as tolerated. PT/OT.     PULMONARY:  Saturating well on RA  Incentive spirometry    CARDIOVASCULAR: Bradycardia episodes s/p atropine- resolved. Now normal sinus rhythm  Monitor on telemetry   Vitals Q1  SBP goal 100-180  TTE: pending    GASTROENTEROLOGY:   LBM 3/4  Regular diet  Bowel regimen- pt refuses. Will encourage and augment bowel regimen 3/8    RENAL/: Mild hyponatremia- resolved  Monitor BMPs. Na goal 135-145    Strict Is/Os; goal euvolemia    ENDOCRINE:  Blood glucose goal 140-180  A1c 5.3    HEME/ONC:  DVT ppx: SQL  Anti Xa- 3/8: 0.16.  Repeat anti Xa level  B/L SCDs    INFECTIOUS: Leukocytosis- worsening. WBC 17- possibly reactive. Related to spasm  Add differential to CBC  Monitor for fevers or other signs of infection    MISC:    SOCIAL/FAMILY:  [] awaiting [x] updated at bedside [] family meeting    CODE STATUS:  [x] Full Code [] DNR [] DNI [] Palliative/Comfort Care    DISPOSITION:  [x] ICU [] Stroke Unit [] Floor [] EMU [] RCU [] PCU    Time spent: 45 critical care minutes

## 2023-03-08 NOTE — PROGRESS NOTE ADULT - SUBJECTIVE AND OBJECTIVE BOX
HPI:  Patient is a 31yo M with PMHx of anxiety who originally presented to [facility] with severe headache, nausea with emesis x 1 and diaphoresis and was found to have a right 6mm ICA aneurysm on CTA. Patient reports that this morning he woke up with a severe headache that was accompanied by an episode of nausea and vomiting. He also endorses that the headache was so severe he became extremely anxious, felt as if he was having a panic attack and became diaphoretic. He states that headaches are controlled from the pain medicine he received while at [place] and did not have any additional episodes of nausea/vomiting. Pending further workup with repeat CTH/CTA and possible cerebral angiogram     Denies visual changes, dizziness, cigarette smoking, family hx of brain aneurysm, previous recurrent headaches, changes in sensation, mental status, unilateral weakness, confusion.    GCS 15, MF0,  HH 1 (27 Feb 2023 23:14)    OVERNIGHT EVENTS: [facility] Course:   2/28: Admitted for further workup, CTH/CTA obtained, LP done, r/o xanthochromia, traumatic LP resulting in blood in all tubes. POD0 s/p cerebral angiogram with findings of 7.8mm R Pcomm aneurysm. Plan for OR in the morning for clipping of aneurysm.   3/1: NICK o/n neuro stable. POD0 R crani for R pcomm aneurysm clipping c/b intraoperative aneurysm rupture, EBL 1L. post op CTH stable. remains intubated post op sedated on propofol/precedex. on thanh gtt. hypotensive 80s, salma 30s, given 1L NS and 1mg atropine. sedation held, thanh changed to levo gtt. restarted on propofol for sedation  3/2: BD4. RBD2. POD1. neuro stable. Propofol overnight while intubated. CTH stable post-op, CTA head and neck shows clipping of R pcomm aneurysm, lack of visualization of R pcomm and R P1 segment possibly secondary to vasospasm, will correlate clinically. Pending formal angiogram tomorrow. Extubated this am. Voiding.   3/3: BD5, RBD2. POD2. NICK o/n neuro stable. 500cc bolus NS given in AM for euvolemia. POD0 angiogram showing complete clip ligation of R pcomm aneurym, no residual. Liberalized SBP goal to 120-180, given 500cc NS bolus.   3/4: BD 6, POD 3 crani for clipping and POD 1 angio. NICK o/n. 1.5L NS bolus for euvolemia. ALONA with a lot of resistance during removal attempt, fellow at bedside, removed staples to open up incision and visualize the drain, drain was removed and 4 horizontal mattress sutures were placed. Started on ancef x  48 hours, given albumin for volume status. Given lactulose and had bowel movement. SQL started this evening  3/5: BD7, POD 4 crani/clipping, POD2 dx angio. NICK overnight. Pain well controlled, no complaints at this time. Headaches controlled. SBP goal 100-180, o/n SBP dipped to 90s, asymptomatic, neuro exam remains intact, nimodipine if BP and/or HR allows. Platelets down in evening but normalized with AM labs. Pending CTA BD10. Anxiety issues overnight, keppra switched to briviact. Had episode of nausea and diaphoresis while working with OT, vital signs stable throughout, given zofran. Improved with resting and lying down.   3/6: BD8, POD5 crani/clipping, POD3. Given 1L LR bolus for euvolemia goal. Given 0.5mg dilaudid IV x1 for breakthrough headache, remains neuro intact. SBP goal 100-180. Pend CTH BD 10 (3/8). 500cc LR bolus given for euvolemia goal.   3/7: BD9, POD6 crani/clipping R PCOMM aneurysm, POD4 s/p angio. NICK overnight, started on headache cocktail and decadron taper. Started back on IVF for poor PO intake, can d/c once tolerating PO. Pend CTA BD#10, pend echo. Cont Briviact, neuro/vitals q1hr. 100mL 3% Na bolus in AM.  4pm BMP Na improved to 138 from 133. D/c'd maintenance fluids.  3/8: BD10, POD7 crani/clipping, POD5 dx angio. NICK o/n neuro stable. lovenox increased 50mg based on anti xa 0.16. CTA today       Vital Signs Last 24 Hrs  T(C): 37.1 (08 Mar 2023 01:01), Max: 37.2 (07 Mar 2023 05:33)  T(F): 98.7 (08 Mar 2023 01:01), Max: 99 (07 Mar 2023 05:33)  HR: 61 (08 Mar 2023 02:00) (61 - 92)  BP: 102/58 (08 Mar 2023 02:00) (96/54 - 123/77)  BP(mean): 77 (08 Mar 2023 02:00) (68 - 95)  RR: 18 (08 Mar 2023 02:00) (16 - 19)  SpO2: 93% (08 Mar 2023 02:00) (93% - 97%)    Parameters below as of 08 Mar 2023 02:00  Patient On (Oxygen Delivery Method): room air        I&O's Summary    06 Mar 2023 07:01  -  07 Mar 2023 07:00  --------------------------------------------------------  IN: 2300 mL / OUT: 2225 mL / NET: 75 mL    07 Mar 2023 07:01  -  08 Mar 2023 02:19  --------------------------------------------------------  IN: 900 mL / OUT: 900 mL / NET: 0 mL        PHYSICAL EXAM:  GEN: laying in bed, NAD  NEURO: AOx3. FC, OE spont, speech intact, face symmetric. +disconjugate gaze. CNII-XII intact. PERRL, EOMI. No pronator drift. MAEx4. 5/5 strength throughout. SILT  CV: RRR +S1/S2  PULM: CTAB  GI: Abd soft, NT/ND  EXT: ext warm, dry, nontender    TUBES/LINES:  [] [place]  [] Lumbar Drain  [] Wound Drains  [] Others      DIET:  [] NPO  [x] Mechanical  [] Tube feeds    LABS:                        12.5   17.93 )-----------( 286      ( 08 Mar 2023 00:48 )             35.9     03-08    136  |  100  |  16  ----------------------------<  151<H>  4.6   |  27  |  0.72    Ca    9.7      08 Mar 2023 00:48  Phos  2.7     03-08  Mg     1.6     03-08              CAPILLARY BLOOD GLUCOSE          Drug Levels: [] N/A    CSF Analysis: [] N/A      Allergies    No Known Allergies    Intolerances      MEDICATIONS:  Antibiotics:    Neuro:  acetaminophen 300 mG/butalbital 50 mG/ caffeine 40 mG 1 Capsule(s) Oral every 6 hours PRN  brivaracetam 50 milliGRAM(s) Oral every 12 hours  ondansetron Injectable 4 milliGRAM(s) IV Push every 6 hours PRN  traMADol 100 milliGRAM(s) Oral every 6 hours    Anticoagulation:  enoxaparin Injectable 50 milliGRAM(s) SubCutaneous <User Schedule>    OTHER:  bisacodyl 5 milliGRAM(s) Oral every 12 hours PRN  chlorhexidine 2% Cloths 1 Application(s) Topical <User Schedule>  dexAMETHasone     Tablet 3 milliGRAM(s) Oral every 8 hours  dexAMETHasone     Tablet   Oral   influenza   Vaccine 0.5 milliLiter(s) IntraMuscular once  niMODipine 30 milliGRAM(s) Oral every 2 hours  polyethylene glycol 3350 17 Gram(s) Oral daily  senna 2 Tablet(s) Oral at bedtime    IVF:  sodium chloride 0.9%. 1000 milliLiter(s) IV Continuous <Continuous>    CULTURES:  Culture Results:   No growth to date. (02-28 @ 05:49)    RADIOLOGY & ADDITIONAL TESTS:      ASSESSMENT:  31 y/o male with h/o of anxiety transferred from [facility] with HA s/p angiogram with findings of R Pcomm aneurysm 2/28. s/p R pteronial crani for R PCOMM clipping w/ intraoperative aneurysm rupture (3/1/23). Now s/p angio showing no residual aneurysm (3/3/23).     ANEURYSM    No pertinent family history in first degree relatives    Handoff    MEWS Score    Anxiety    Cerebral aneurysm    Cerebral aneurysm    Unruptured cerebral aneurysm    Anxiety    Pre-op evaluation    Leukocytosis    Lumbar puncture    Angiogram, carotid and cerebral, bilateral    Craniotomy, for aneurysm repair    No significant past surgical history    Room Service Assist    [email or web address]        PLAN:  NEURO   - Neuro/vitals q1 hours   - Pain control - tramadol PRN   - Briviact 50 mg BID   - CTA 3/2: possible PCOMM vasospasm, CTA BD10 3/8   - Angio 3/3 negative for spasm, complete obliteration of aneurysm   - Nimodipine 30 mg q 2hrs until BD21 (hold parameters for hypotension and bradycardia)   - s/p LP on admission: traumatic tap, inconclusive for SAH     PULM   - RA, Encourage IS     CARDIO  - -180   - Echo pending from admission     GI  - Regular diet, poor PO intake   - Bowel regimen   - Last BM 3/4  - Zofran prn nausea     ENDO  - ISS dc'd, A1c 5.3     RENAL  - Voiding   - Euvolemia goal    HEME  - SCDs, SQL 50    ID  - Afebrile     Dispo:   - ICU status, full code, dispo pending   - PT/OT home no needs when medically ready     Assessment and plan discussed with Dr. Wiseman and Dr. Paz       Assessment:  Present when checked    []  GCS  E   V  M     Heart Failure: []Acute, [] acute on chronic , []chronic  Heart Failure:  [] Diastolic (HFpEF), [] Systolic (HFrEF), []Combined (HFpEF and HFrEF), [] RHF, [] Pulm HTN, [] Other    [] EDISON, [] ATN, [] AIN, [] other  [] CKD1, [] CKD2, [] CKD 3, [] CKD 4, [] CKD 5, []ESRD    Encephalopathy: [] Metabolic, [] Hepatic, [] toxic, [] Neurological, [] Other    Abnormal Nurtitional Status: [] malnurtition (see nutrition note), [ ]underweight: BMI < 19, [] morbid obesity: BMI >40, [] Cachexia    [] Sepsis  [] hypovolemic shock,[] cardiogenic shock, [] hemorrhagic shock, [] neuogenic shock  [] Acute Respiratory Failure  []Cerebral edema, [] Brain compression/ herniation,   [] Functional quadriplegia  [] Acute blood loss anemia

## 2023-03-09 LAB
ANION GAP SERPL CALC-SCNC: 7 MMOL/L — SIGNIFICANT CHANGE UP (ref 5–17)
BUN SERPL-MCNC: 17 MG/DL — SIGNIFICANT CHANGE UP (ref 7–23)
CALCIUM SERPL-MCNC: 9.4 MG/DL — SIGNIFICANT CHANGE UP (ref 8.4–10.5)
CHLORIDE SERPL-SCNC: 103 MMOL/L — SIGNIFICANT CHANGE UP (ref 96–108)
CO2 SERPL-SCNC: 27 MMOL/L — SIGNIFICANT CHANGE UP (ref 22–31)
CREAT SERPL-MCNC: 0.72 MG/DL — SIGNIFICANT CHANGE UP (ref 0.5–1.3)
EGFR: 124 ML/MIN/1.73M2 — SIGNIFICANT CHANGE UP
GLUCOSE SERPL-MCNC: 123 MG/DL — HIGH (ref 70–99)
HCT VFR BLD CALC: 34.9 % — LOW (ref 39–50)
HGB BLD-MCNC: 11.9 G/DL — LOW (ref 13–17)
MAGNESIUM SERPL-MCNC: 1.6 MG/DL — SIGNIFICANT CHANGE UP (ref 1.6–2.6)
MCHC RBC-ENTMCNC: 31.6 PG — SIGNIFICANT CHANGE UP (ref 27–34)
MCHC RBC-ENTMCNC: 34.1 GM/DL — SIGNIFICANT CHANGE UP (ref 32–36)
MCV RBC AUTO: 92.6 FL — SIGNIFICANT CHANGE UP (ref 80–100)
NRBC # BLD: 0 /100 WBCS — SIGNIFICANT CHANGE UP (ref 0–0)
PHOSPHATE SERPL-MCNC: 3.3 MG/DL — SIGNIFICANT CHANGE UP (ref 2.5–4.5)
PLATELET # BLD AUTO: 304 K/UL — SIGNIFICANT CHANGE UP (ref 150–400)
POTASSIUM SERPL-MCNC: 4.4 MMOL/L — SIGNIFICANT CHANGE UP (ref 3.5–5.3)
POTASSIUM SERPL-SCNC: 4.4 MMOL/L — SIGNIFICANT CHANGE UP (ref 3.5–5.3)
RBC # BLD: 3.77 M/UL — LOW (ref 4.2–5.8)
RBC # FLD: 14.1 % — SIGNIFICANT CHANGE UP (ref 10.3–14.5)
SODIUM SERPL-SCNC: 137 MMOL/L — SIGNIFICANT CHANGE UP (ref 135–145)
WBC # BLD: 15.37 K/UL — HIGH (ref 3.8–10.5)
WBC # FLD AUTO: 15.37 K/UL — HIGH (ref 3.8–10.5)

## 2023-03-09 PROCEDURE — 99232 SBSQ HOSP IP/OBS MODERATE 35: CPT

## 2023-03-09 RX ORDER — SODIUM CHLORIDE 9 MG/ML
500 INJECTION INTRAMUSCULAR; INTRAVENOUS; SUBCUTANEOUS ONCE
Refills: 0 | Status: COMPLETED | OUTPATIENT
Start: 2023-03-09 | End: 2023-03-09

## 2023-03-09 RX ORDER — MAGNESIUM SULFATE 500 MG/ML
2 VIAL (ML) INJECTION ONCE
Refills: 0 | Status: COMPLETED | OUTPATIENT
Start: 2023-03-09 | End: 2023-03-09

## 2023-03-09 RX ADMIN — Medication 2 MILLIGRAM(S): at 05:46

## 2023-03-09 RX ADMIN — TRAMADOL HYDROCHLORIDE 100 MILLIGRAM(S): 50 TABLET ORAL at 01:00

## 2023-03-09 RX ADMIN — POLYETHYLENE GLYCOL 3350 17 GRAM(S): 17 POWDER, FOR SOLUTION ORAL at 19:20

## 2023-03-09 RX ADMIN — Medication 2 MILLIGRAM(S): at 15:16

## 2023-03-09 RX ADMIN — BRIVARACETAM 50 MILLIGRAM(S): 25 TABLET, FILM COATED ORAL at 05:46

## 2023-03-09 RX ADMIN — ENOXAPARIN SODIUM 50 MILLIGRAM(S): 100 INJECTION SUBCUTANEOUS at 23:10

## 2023-03-09 RX ADMIN — CHLORHEXIDINE GLUCONATE 1 APPLICATION(S): 213 SOLUTION TOPICAL at 05:46

## 2023-03-09 RX ADMIN — BRIVARACETAM 50 MILLIGRAM(S): 25 TABLET, FILM COATED ORAL at 18:50

## 2023-03-09 RX ADMIN — Medication 2 MILLIGRAM(S): at 22:50

## 2023-03-09 RX ADMIN — TRAMADOL HYDROCHLORIDE 100 MILLIGRAM(S): 50 TABLET ORAL at 00:05

## 2023-03-09 RX ADMIN — Medication 25 GRAM(S): at 07:11

## 2023-03-09 RX ADMIN — POLYETHYLENE GLYCOL 3350 17 GRAM(S): 17 POWDER, FOR SOLUTION ORAL at 05:46

## 2023-03-09 RX ADMIN — SENNA PLUS 2 TABLET(S): 8.6 TABLET ORAL at 22:50

## 2023-03-09 RX ADMIN — PANTOPRAZOLE SODIUM 40 MILLIGRAM(S): 20 TABLET, DELAYED RELEASE ORAL at 06:14

## 2023-03-09 NOTE — PROGRESS NOTE ADULT - ASSESSMENT
Assessment: 31 y/o M found to have ruptured R PCOMM s/p DSA w/o intervention now s/p crani & clipping c/b intraop re-rupture.  BD 11    NEURO:  Neurochecks Q1  Seizure ppx: briviact 50mg BID per NSGY. Consider DC.  DCI management- serial TCDs, CTA 3/8- shows some R M1 segment and R A1 segment mild to moderate vasospasm. R thalamic infarct.  Continue Nimodipine with hold parameters (for HR 55, ) for total of 21days    Analgesia: HA cocktail (decadron, tramadol, Mg, fioricet)  Activity: Ambulate as tolerated. PT/OT.     PULMONARY:  Saturating well on RA  Incentive spirometry    CARDIOVASCULAR: Bradycardia episodes s/p atropine- resolved. Now normal sinus rhythm  Monitor on telemetry   Vitals Q1  SBP goal 100-180  TTE: pending    GASTROENTEROLOGY:   LBM 3/4  Regular diet  Bowel regimen- pt refuses. Will encourage and augment bowel regimen 3/8    RENAL/: Mild hyponatremia- resolved  Monitor BMPs. Na goal 135-145    Strict Is/Os; goal euvolemia    ENDOCRINE:  Blood glucose goal 140-180  A1c 5.3    HEME/ONC:  DVT ppx: SQL  Anti Xa- 3/8: 0.16.  Repeat anti Xa level:  B/L SCDs    INFECTIOUS: Leukocytosis- improving 17->: 15. Possibly reactive  Monitor for fevers or other signs of infection    MISC:    SOCIAL/FAMILY:  [] awaiting [x] updated at bedside [] family meeting    CODE STATUS:  [x] Full Code [] DNR [] DNI [] Palliative/Comfort Care    DISPOSITION:  [] ICU [] Stroke Unit [x] Floor [] EMU [] RCU [] PCU     Assessment: 33 y/o M found to have ruptured R PCOMM s/p DSA w/o intervention now s/p crani & clipping c/b intraop re-rupture; POD8  BD 11    NEURO:  Neurochecks Q4  Seizure ppx: briviact 50mg BID per NSGY. Consider DC.  DCI management- serial TCDs, CTA 3/8- shows some R M1 segment and R A1 segment mild to moderate vasospasm. R thalamic infarct.  Continue nimodipine with hold parameters (for HR 55, ) for total of 21days    Analgesia: HA cocktail (decadron, tramadol, Mg, fioricet)  Activity: Ambulate as tolerated. PT/OT.     PULMONARY:  Saturating well on RA  Incentive spirometry    CARDIOVASCULAR: Bradycardia episodes s/p atropine- resolved. Now normal sinus rhythm  Monitor on telemetry   SBP goal 100-180  TTE: pending    GASTROENTEROLOGY:   LBM 3/4  Regular diet.  Bowel regimen- pt refuses. Will encourage and augment bowel regimen 3/8    RENAL/: Mild hyponatremia- resolved  Monitor BMPs. Na goal 135-145  Goal euvolemia    ENDOCRINE:  Blood glucose goal 140-180  A1c 5.3    HEME/ONC:  DVT ppx: SQL 50mg daily re Anti Xa- 3/8: 0.16.   Repeat anti Xa level: 3/11 2am  B/L SCDs    INFECTIOUS: Leukocytosis- improving 17->: 15. Possibly reactive  Monitor for fevers or other signs of infection    MISC:    SOCIAL/FAMILY:  [] awaiting [x] updated at bedside [] family meeting    CODE STATUS:  [x] Full Code [] DNR [] DNI [] Palliative/Comfort Care    DISPOSITION:  [] ICU [] Stroke Unit [x] Floor [] EMU [] RCU [] PCU      ICU stepdown Checklist:    Completed: 03-09 @ 9:43    [X] hemodynamically stable – VS WNL and stable x 24hours, UO adequate  [n/a ] if  previously on HDA - off pressors x 24h with stable neuro exam    [X] no new symptoms x 24h (i.e. new fever, new-onset nausea/vomiting)  [X] stable labs: (i.e. WBC not rising, sodium not dropping)  [X] patient not at high risk for aspiration, if high risk then:                  [ ] should have definitive plans for trach/PEG (alternative option is to discharge from ICU to facilty)                  [ ] stepdown to bed close to nurse’s station  [n/a] low suctioning requirements (i.e. q4h or less)  [X] sign-off from primary RN*  [X] drains do not require ICU level of care  [X] if patient previously agitated or with behavioral issues – controlled   [X] pain controlled

## 2023-03-09 NOTE — DISCHARGE NOTE PROVIDER - HOSPITAL COURSE
HPI:  Patient is a 31yo M with PMHx of anxiety who originally presented to [facility] with severe headache, nausea with emesis x 1 and diaphoresis and was found to have a right 6mm ICA aneurysm on CTA. Patient reports that this morning he woke up with a severe headache that was accompanied by an episode of nausea and vomiting. He also endorses that the headache was so severe he became extremely anxious, felt as if he was having a panic attack and became diaphoretic. He states that headaches are controlled from the pain medicine he received while at [place] and did not have any additional episodes of nausea/vomiting. Pending further workup with repeat CTH/CTA and possible cerebral angiogram     Denies visual changes, dizziness, cigarette smoking, family hx of brain aneurysm, previous recurrent headaches, changes in sensation, mental status, unilateral weakness, confusion. GCS 15, MF0,  HH 1      Hospital Course:  2/28: Admitted for further workup, CTH/CTA obtained, LP done, r/o xanthochromia, traumatic LP resulting in blood in all tubes. POD0 s/p cerebral angiogram with findings of 7.8mm R Pcomm aneurysm. Plan for OR in the morning for clipping of aneurysm.   3/1: NICK o/n neuro stable. POD0 R crani for R pcomm aneurysm clipping c/b intraoperative aneurysm rupture, EBL 1L. post op CTH stable. remains intubated post op sedated on propofol/precedex. on thanh gtt. hypotensive 80s, salma 30s, given 1L NS and 1mg atropine. sedation held, thanh changed to levo gtt. restarted on propofol for sedation  3/2: BD4. RBD2. POD1. neuro stable. Propofol overnight while intubated. CTH stable post-op, CTA head and neck shows clipping of R pcomm aneurysm, lack of visualization of R pcomm and R P1 segment possibly secondary to vasospasm, will correlate clinically. Pending formal angiogram tomorrow. Extubated this am. Voiding.   3/3: BD5, RBD2. POD2. NICK o/n neuro stable. 500cc bolus NS given in AM for euvolemia. POD0 angiogram showing complete clip ligation of R pcomm aneurym, no residual. Liberalized SBP goal to 120-180, given 500cc NS bolus.   3/4: BD 6, POD 3 crani for clipping and POD 1 angio. NICK o/n. 1.5L NS bolus for euvolemia. ALONA with a lot of resistance during removal attempt, fellow at bedside, removed staples to open up incision and visualize the drain, drain was removed and 4 horizontal mattress sutures were placed. Started on ancef x  48 hours, given albumin for volume status. Given lactulose and had bowel movement. SQL started this evening  3/5: BD7, POD 4 crani/clipping, POD2 dx angio. NICK overnight. Pain well controlled, no complaints at this time. Headaches controlled. SBP goal 100-180, o/n SBP dipped to 90s, asymptomatic, neuro exam remains intact, nimodipine if BP and/or HR allows. Platelets down in evening but normalized with AM labs. Pending CTA BD10. Anxiety issues overnight, keppra switched to briviact. Had episode of nausea and diaphoresis while working with OT, vital signs stable throughout, given zofran. Improved with resting and lying down.   3/6: BD8, POD5 crani/clipping, POD3. Given 1L LR bolus for euvolemia goal. Given 0.5mg dilaudid IV x1 for breakthrough headache, remains neuro intact. SBP goal 100-180. Pend CTH BD 10 (3/8). 500cc LR bolus given for euvolemia goal.   3/7: BD9, POD6 crani/clipping R PCOMM aneurysm, POD4 s/p angio. NICK overnight, started on headache cocktail and decadron taper. Started back on IVF for poor PO intake, can d/c once tolerating PO. Pend CTA BD#10, pend echo. Cont Briviact, neuro/vitals q1hr. 100mL 3% Na bolus in AM.  4pm BMP Na improved to 138 from 133. D/c'd maintenance fluids.  3/8: BD10, POD7 crani/clipping, POD5 dx angio. NICK o/n neuro stable. lovenox increased 50mg based on anti xa 0.16. CTA reading new R thalamic stroke and more pronounced narrowing of R A1 and proximal M1. Elevated wbc attributed to vasospasm  500cc NS bolus for  sbp in 80s with resolution to low 100s. Dcd maintenance fluids.   3/9: BD11, POD8 crani, POD6 dx angio. NICK o/n neuro stable.     Patient evaluated by PT/OT who recommended: home with no needs  Patient is going home? rehab? hospice? Facility Name:     Hospital course c/b:     Exam on day of discharge:    Checklist:   - Obtained follow up appointment from NP  - Reviewed final recommendations of inpatient consults  - review discharge planning on provider handoff  - post op imaging completed  - Neurologically stable for discharge  - Vitals stable for discharge   - Afebrile for discharge  - WBC is stable  - Sodium level is normal  - Pain is adequately controlled  - Pt has PICC/walker/brace/collar   - LACE score (10 or > needs PCP apt)

## 2023-03-09 NOTE — PROGRESS NOTE ADULT - SUBJECTIVE AND OBJECTIVE BOX
INTERVAL HISTORY: HPI:  Patient is a 31 y/o M with PMHx of anxiety who originally presented to [facility] with severe headache, nausea with emesis x 1 and diaphoresis and was found to have a right 6mm ICA aneurysm on CTA. Patient reports that this morning he woke up with a severe headache that was accompanied by an episode of nausea and vomiting. He also endorses that the headache was so severe he became extremely anxious, felt as if he was having a panic attack and became diaphoretic. He states that headaches are controlled from the pain medicine he received while at [place] and did not have any additional episodes of nausea/vomiting. Pending further workup with repeat CTH/CTA and possible cerebral angiogram     Denies visual changes, dizziness, cigarette smoking, family hx of brain aneurysm, previous recurrent headaches, changes in sensation, mental status, unilateral weakness, confusion. (27 Feb 2023 23:14)    PAST MEDICAL & SURGICAL HISTORY:  Anxiety  No significant past surgical history    REVIEW OF SYSTEMS: [ ] Unable to Assess due to neurologic exam   [x] All ROS addressed below are non-contributory, except:  Neuro: [ ] Headache [ ] Back pain [ ] Numbness [ ] Weakness [ ] Ataxia [ ] Dizziness [ ] Aphasia [ ] Dysarthria [ ] Visual disturbance  Resp: [ ] Shortness of breath/dyspnea, [ ] Orthopnea [ ] Cough  CV: [ ] Chest pain [ ] Palpitation [ ] Lightheadedness [ ] Syncope  Renal: [ ] Thirst [ ] Edema  GI: [ ] Nausea [ ] Emesis [ ] Abdominal pain [ ] Constipation [ ] Diarrhea  Hem: [ ] Hematemesis [ ] bright red blood per rectum  ID: [ ] Fever [ ] Chills [ ] Dysuria  ENT: [ ] Rhinorrhea        ICU Vital Signs Last 24 Hrs  T(C): 37.1 (09 Mar 2023 04:23), Max: 37.2 (08 Mar 2023 17:45)  T(F): 98.8 (09 Mar 2023 04:23), Max: 98.9 (08 Mar 2023 17:45)  HR: 56 (09 Mar 2023 07:00) (55 - 80)  BP: 108/56 (09 Mar 2023 07:00) (91/55 - 119/70)  BP(mean): 77 (09 Mar 2023 07:00) (66 - 90)  RR: 16 (09 Mar 2023 07:00) (16 - 18)  SpO2: 98% (09 Mar 2023 07:00) (93% - 99%)      03-08-23 @ 07:01  -  03-09-23 @ 07:00  --------------------------------------------------------  IN: 1950 mL / OUT: 1725 mL / NET: 225 mL    PHYSICAL EXAM:  General: No Acute Distress,  Neurological: Eyes open to voice, follows commands, pupils 2mm reactive, b/l UE 5/5 b/l LE 5/5   Pulmonary: Clear to Auscultation, No Rales, No Rhonchi, No Wheezes   Cardiovascular: S1, S2, Regular Rate and Rhythm   Gastrointestinal: Soft, Nontender, Nondistended   Extremities: No calf tenderness       MEDICATIONS:   acetaminophen 300 mG/butalbital 50 mG/ caffeine 40 mG 1 Capsule(s) Oral every 6 hours PRN  bisacodyl 5 milliGRAM(s) Oral every 12 hours PRN  brivaracetam 50 milliGRAM(s) Oral every 12 hours  dexAMETHasone     Tablet 2 milliGRAM(s) Oral every 8 hours  dexAMETHasone     Tablet   Oral   enoxaparin Injectable 50 milliGRAM(s) SubCutaneous <User Schedule>  influenza   Vaccine 0.5 milliLiter(s) IntraMuscular once  niMODipine 30 milliGRAM(s) Oral every 2 hours  ondansetron Injectable 4 milliGRAM(s) IV Push every 6 hours PRN  pantoprazole    Tablet 40 milliGRAM(s) Oral before breakfast  polyethylene glycol 3350 17 Gram(s) Oral two times a day  senna 2 Tablet(s) Oral at bedtime    LABS:                      11.9   15.37 )-----------( 304      ( 09 Mar 2023 05:13 )             34.9     03-09    137  |  103  |  17  ----------------------------<  123<H>  4.4   |  27  |  0.72    Ca    9.4      09 Mar 2023 05:13  Phos  3.3     03-09  Mg     1.6     03-09      RADIOLOGY & ADDITIONAL STUDIES:  < from: CT Angio Neck w/ IV Cont (02.28.23 @ 01:15) >  Intracranial CTA: 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    1 mm superiorly directed outpouching of the paraclinoid right ICA which   may represent origin of the ophthalmic artery versus small aneurysm..    Extracranial CTA: No steno-occlusive disease.    < end of copied text >     INTERVAL HISTORY: HPI:  Patient is a 33 y/o M with PMHx of anxiety who originally presented to [facility] with severe headache, nausea with emesis x 1 and diaphoresis and was found to have a right 6mm ICA aneurysm on CTA. Patient reports that this morning he woke up with a severe headache that was accompanied by an episode of nausea and vomiting. He also endorses that the headache was so severe he became extremely anxious, felt as if he was having a panic attack and became diaphoretic. He states that headaches are controlled from the pain medicine he received while at [place] and did not have any additional episodes of nausea/vomiting. Pending further workup with repeat CTH/CTA and possible cerebral angiogram     Denies visual changes, dizziness, cigarette smoking, family hx of brain aneurysm, previous recurrent headaches, changes in sensation, mental status, unilateral weakness, confusion. (27 Feb 2023 23:14)    PAST MEDICAL & SURGICAL HISTORY:  Anxiety  No significant past surgical history    REVIEW OF SYSTEMS: [ ] Unable to Assess due to neurologic exam   [x] All ROS addressed below are non-contributory, except:  Neuro: [ ] Headache [ ] Back pain [ ] Numbness [ ] Weakness [ ] Ataxia [ ] Dizziness [ ] Aphasia [ ] Dysarthria [ ] Visual disturbance  Resp: [ ] Shortness of breath/dyspnea, [ ] Orthopnea [ ] Cough  CV: [ ] Chest pain [ ] Palpitation [ ] Lightheadedness [ ] Syncope  Renal: [ ] Thirst [ ] Edema  GI: [ ] Nausea [ ] Emesis [ ] Abdominal pain [ ] Constipation [ ] Diarrhea  Hem: [ ] Hematemesis [ ] bright red blood per rectum  ID: [ ] Fever [ ] Chills [ ] Dysuria  ENT: [ ] Rhinorrhea      ICU Vital Signs Last 24 Hrs  T(C): 37.1 (09 Mar 2023 04:23), Max: 37.2 (08 Mar 2023 17:45)  T(F): 98.8 (09 Mar 2023 04:23), Max: 98.9 (08 Mar 2023 17:45)  HR: 56 (09 Mar 2023 07:00) (55 - 80)  BP: 108/56 (09 Mar 2023 07:00) (91/55 - 119/70)  BP(mean): 77 (09 Mar 2023 07:00) (66 - 90)  RR: 16 (09 Mar 2023 07:00) (16 - 18)  SpO2: 98% (09 Mar 2023 07:00) (93% - 99%)      03-08-23 @ 07:01  -  03-09-23 @ 07:00  --------------------------------------------------------  IN: 1950 mL / OUT: 1725 mL / NET: 225 mL    PHYSICAL EXAM:  General: No Acute Distress,  Neurological: Eyes open to voice, follows commands, pupils 2mm reactive, b/l UE 5/5 b/l LE 5/5   Pulmonary: Clear to Auscultation, No Rales, No Rhonchi, No Wheezes   Cardiovascular: S1, S2, Regular Rate and Rhythm   Gastrointestinal: Soft, Nontender, Nondistended   Extremities: No calf tenderness       MEDICATIONS:   acetaminophen 300 mG/butalbital 50 mG/ caffeine 40 mG 1 Capsule(s) Oral every 6 hours PRN  bisacodyl 5 milliGRAM(s) Oral every 12 hours PRN  brivaracetam 50 milliGRAM(s) Oral every 12 hours  dexAMETHasone     Tablet 2 milliGRAM(s) Oral every 8 hours  dexAMETHasone     Tablet   Oral   enoxaparin Injectable 50 milliGRAM(s) SubCutaneous <User Schedule>  influenza   Vaccine 0.5 milliLiter(s) IntraMuscular once  niMODipine 30 milliGRAM(s) Oral every 2 hours  ondansetron Injectable 4 milliGRAM(s) IV Push every 6 hours PRN  pantoprazole    Tablet 40 milliGRAM(s) Oral before breakfast  polyethylene glycol 3350 17 Gram(s) Oral two times a day  senna 2 Tablet(s) Oral at bedtime    LABS:                      11.9   15.37 )-----------( 304      ( 09 Mar 2023 05:13 )             34.9     03-09    137  |  103  |  17  ----------------------------<  123<H>  4.4   |  27  |  0.72    Ca    9.4      09 Mar 2023 05:13  Phos  3.3     03-09  Mg     1.6     03-09      RADIOLOGY & ADDITIONAL STUDIES:  < from: CT Angio Neck w/ IV Cont (02.28.23 @ 01:15) >  Intracranial CTA: 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    1 mm superiorly directed outpouching of the paraclinoid right ICA which   may represent origin of the ophthalmic artery versus small aneurysm..    Extracranial CTA: No steno-occlusive disease.    < end of copied text >

## 2023-03-09 NOTE — PROGRESS NOTE ADULT - SUBJECTIVE AND OBJECTIVE BOX
HPI:  Patient is a 31yo M with PMHx of anxiety who originally presented to [facility] with severe headache, nausea with emesis x 1 and diaphoresis and was found to have a right 6mm ICA aneurysm on CTA. Patient reports that this morning he woke up with a severe headache that was accompanied by an episode of nausea and vomiting. He also endorses that the headache was so severe he became extremely anxious, felt as if he was having a panic attack and became diaphoretic. He states that headaches are controlled from the pain medicine he received while at [employer] and did not have any additional episodes of nausea/vomiting. Pending further workup with repeat CTH/CTA and possible cerebral angiogram     Denies visual changes, dizziness, cigarette smoking, family hx of brain aneurysm, previous recurrent headaches, changes in sensation, mental status, unilateral weakness, confusion.    GCS 15, MF0,  HH 1 (27 Feb 2023 23:14)    OVERNIGHT EVENTS: [facility] Course:   2/28: Admitted for further workup, CTH/CTA obtained, LP done, r/o xanthochromia, traumatic LP resulting in blood in all tubes. POD0 s/p cerebral angiogram with findings of 7.8mm R Pcomm aneurysm. Plan for OR in the morning for clipping of aneurysm.   3/1: NICK o/n neuro stable. POD0 R crani for R pcomm aneurysm clipping c/b intraoperative aneurysm rupture, EBL 1L. post op CTH stable. remains intubated post op sedated on propofol/precedex. on thanh gtt. hypotensive 80s, salma 30s, given 1L NS and 1mg atropine. sedation held, thanh changed to levo gtt. restarted on propofol for sedation  3/2: BD4. RBD2. POD1. neuro stable. Propofol overnight while intubated. CTH stable post-op, CTA head and neck shows clipping of R pcomm aneurysm, lack of visualization of R pcomm and R P1 segment possibly secondary to vasospasm, will correlate clinically. Pending formal angiogram tomorrow. Extubated this am. Voiding.   3/3: BD5, RBD2. POD2. NICK o/n neuro stable. 500cc bolus NS given in AM for euvolemia. POD0 angiogram showing complete clip ligation of R pcomm aneurym, no residual. Liberalized SBP goal to 120-180, given 500cc NS bolus.   3/4: BD 6, POD 3 crani for clipping and POD 1 angio. NICK o/n. 1.5L NS bolus for euvolemia. ALONA with a lot of resistance during removal attempt, fellow at bedside, removed staples to open up incision and visualize the drain, drain was removed and 4 horizontal mattress sutures were placed. Started on ancef x  48 hours, given albumin for volume status. Given lactulose and had bowel movement. SQL started this evening  3/5: BD7, POD 4 crani/clipping, POD2 dx angio. NICK overnight. Pain well controlled, no complaints at this time. Headaches controlled. SBP goal 100-180, o/n SBP dipped to 90s, asymptomatic, neuro exam remains intact, nimodipine if BP and/or HR allows. Platelets down in evening but normalized with AM labs. Pending CTA BD10. Anxiety issues overnight, keppra switched to briviact. Had episode of nausea and diaphoresis while working with OT, vital signs stable throughout, given zofran. Improved with resting and lying down.   3/6: BD8, POD5 crani/clipping, POD3. Given 1L LR bolus for euvolemia goal. Given 0.5mg dilaudid IV x1 for breakthrough headache, remains neuro intact. SBP goal 100-180. Pend CTH BD 10 (3/8). 500cc LR bolus given for euvolemia goal.   3/7: BD9, POD6 crani/clipping R PCOMM aneurysm, POD4 s/p angio. NICK overnight, started on headache cocktail and decadron taper. Started back on IVF for poor PO intake, can d/c once tolerating PO. Pend CTA BD#10, pend echo. Cont Briviact, neuro/vitals q1hr. 100mL 3% Na bolus in AM.  4pm BMP Na improved to 138 from 133. D/c'd maintenance fluids.  3/8: BD10, POD7 crani/clipping, POD5 dx angio. NICK o/n neuro stable. lovenox increased 50mg based on anti xa 0.16. CTA reading new R thalamic stroke and more pronounced narrowing of R A1 and proximal M1. Elevated wbc attributed to vasospasm  500cc NS bolus for  sbp in 80s with resolution to low 100s. Dcd maintenance fluids.   3/9: BD11, POD8 crani, POD6 dx angio. NICK o/n neuro stable.     Vital Signs Last 24 Hrs  T(C): 37.2 (08 Mar 2023 21:25), Max: 37.2 (08 Mar 2023 17:45)  T(F): 98.9 (08 Mar 2023 21:25), Max: 98.9 (08 Mar 2023 17:45)  HR: 62 (08 Mar 2023 23:00) (58 - 80)  BP: 99/52 (08 Mar 2023 23:00) (91/55 - 119/70)  BP(mean): 70 (08 Mar 2023 23:00) (66 - 90)  RR: 18 (08 Mar 2023 23:00) (16 - 18)  SpO2: 97% (08 Mar 2023 23:00) (93% - 98%)    Parameters below as of 08 Mar 2023 23:00  Patient On (Oxygen Delivery Method): room air        I&O's Summary    07 Mar 2023 07:01  -  08 Mar 2023 07:00  --------------------------------------------------------  IN: 1415 mL / OUT: 1500 mL / NET: -85 mL    08 Mar 2023 07:01  -  09 Mar 2023 00:24  --------------------------------------------------------  IN: 1950 mL / OUT: 1025 mL / NET: 925 mL        PHYSICAL EXAM:  GEN: laying in bed, NAD  NEURO: AOx3. FC, OE spont, speech intact, face symmetric. CNII-XII intact. PERRL, EOMI. No pronator drift. MAEx4. 5/5 strength throughout. SILT  CV: RRR +S1/S2  PULM: CTAB  GI: Abd soft, NT/ND  EXT: ext warm, dry, nontender  WOUND: R crani site c/d/i, staples in place    TUBES/LINES:  [] [place]  [] Lumbar Drain  [] Wound Drains  [] Others      DIET:  [] NPO  [x] Mechanical  [] Tube feeds    LABS:                        12.5   17.93 )-----------( 286      ( 08 Mar 2023 00:48 )             35.9     03-08    136  |  100  |  16  ----------------------------<  151<H>  4.6   |  27  |  0.72    Ca    9.7      08 Mar 2023 00:48  Phos  2.7     03-08  Mg     1.6     03-08              CAPILLARY BLOOD GLUCOSE          Drug Levels: [] N/A    CSF Analysis: [] N/A      Allergies    No Known Allergies    Intolerances      MEDICATIONS:  Antibiotics:    Neuro:  acetaminophen 300 mG/butalbital 50 mG/ caffeine 40 mG 1 Capsule(s) Oral every 6 hours PRN  brivaracetam 50 milliGRAM(s) Oral every 12 hours  ondansetron Injectable 4 milliGRAM(s) IV Push every 6 hours PRN    Anticoagulation:  enoxaparin Injectable 50 milliGRAM(s) SubCutaneous <User Schedule>    OTHER:  bisacodyl 5 milliGRAM(s) Oral every 12 hours PRN  chlorhexidine 2% Cloths 1 Application(s) Topical <User Schedule>  dexAMETHasone     Tablet 2 milliGRAM(s) Oral every 8 hours  dexAMETHasone     Tablet   Oral   influenza   Vaccine 0.5 milliLiter(s) IntraMuscular once  niMODipine 30 milliGRAM(s) Oral every 2 hours  pantoprazole    Tablet 40 milliGRAM(s) Oral before breakfast  polyethylene glycol 3350 17 Gram(s) Oral two times a day  senna 2 Tablet(s) Oral at bedtime    IVF:    CULTURES:  Culture Results:   No growth to date (02-28 @ 05:49)    RADIOLOGY & ADDITIONAL TESTS:      ASSESSMENT:  33 y/o male with h/o of anxiety transferred from [facility] with HA s/p angiogram with findings of R Pcomm aneurysm 2/28. s/p R pteronial crani for R PCOMM clipping w/ intraoperative aneurysm rupture (3/1/23). Now s/p angio showing no residual aneurysm (3/3/23).     ANEURYSM    No pertinent family history in first degree relatives    Handoff    MEWS Score    Anxiety    Cerebral aneurysm    Cerebral aneurysm    Unruptured cerebral aneurysm    Anxiety    Pre-op evaluation    Leukocytosis    Lumbar puncture    Angiogram, carotid and cerebral, bilateral    Craniotomy, for aneurysm repair    No significant past surgical history    Room Service Assist    [email or web address]        PLAN:  NEURO   - Neuro/vitals q1 hours   - Pain control - tramadol PRN   - Briviact 50 mg BID   - CTA 3/2: possible PCOMM vasospasm,   - CTA BD10 3/8 R thalamic stroke and more pronounced narrowing of R A1 and proximal M1  - Angio 3/3 negative for spasm, complete obliteration of aneurysm   - Nimodipine 30 mg q 2hrs until BD21 (hold parameters for hypotension and bradycardia)   - s/p LP on admission: traumatic tap, inconclusive for SAH     PULM   - RA, Encourage IS     CARDIO  - -180     GI  - Regular diet, poor PO intake   - Bowel regimen   - Last BM 3/4  - Zofran prn nausea     ENDO  - ISS dc'd, A1c 5.3     RENAL  - Voiding   - Euvolemia goal    HEME  - SCDs, SQL 50    ID  - Afebrile     Dispo:   - ICU status, full code, dispo pending   - PT/OT home no needs when medically ready     Assessment and plan discussed with Dr. Wiseman and Dr. Paz       Assessment:  Present when checked    []  GCS  E   V  M     Heart Failure: []Acute, [] acute on chronic , []chronic  Heart Failure:  [] Diastolic (HFpEF), [] Systolic (HFrEF), []Combined (HFpEF and HFrEF), [] RHF, [] Pulm HTN, [] Other    [] EDISON, [] ATN, [] AIN, [] other  [] CKD1, [] CKD2, [] CKD 3, [] CKD 4, [] CKD 5, []ESRD    Encephalopathy: [] Metabolic, [] Hepatic, [] toxic, [] Neurological, [] Other    Abnormal Nurtitional Status: [] malnurtition (see nutrition note), [ ]underweight: BMI < 19, [] morbid obesity: BMI >40, [] Cachexia    [] Sepsis  [] hypovolemic shock,[] cardiogenic shock, [] hemorrhagic shock, [] neuogenic shock  [] Acute Respiratory Failure  []Cerebral edema, [] Brain compression/ herniation,   [] Functional quadriplegia  [] Acute blood loss anemia

## 2023-03-09 NOTE — DISCHARGE NOTE PROVIDER - NSDCCPTREATMENT_GEN_ALL_CORE_FT
PRINCIPAL PROCEDURE  Procedure: Craniotomy, for aneurysm repair  Findings and Treatment: s/p clipping of R Pcomm aneurysm      SECONDARY PROCEDURE  Procedure: Angiogram, carotid and cerebral, bilateral  Findings and Treatment:     Procedure: Lumbar puncture  Findings and Treatment:

## 2023-03-09 NOTE — DISCHARGE NOTE PROVIDER - NSDCFUADDINST_GEN_ALL_CORE_FT
Neurosurgery follow up appointment date/time:  - follow up in the office for a wound check and staple? removal   - please call the office to confirm appointment: [phone]    Wound Care:  - shower and wash hair daily  - pat dry incision after showering  - leave incision uncovered, open to air     Devices/Drains/Lines:    Activity:  - fatigue is common after surgery, rest if you feel tired   - no bending, lifting, twisting or heavy lifting   - walking is recommended, ambulate as tolerated  - you may shower when you get home, keep your incision dry  - no bathing   - no driving within 24 hours of anesthesia or while taking prescription pain medications   - keep hydrated, drink plenty of water     Inpatient consults:  - final recommendations  - you will need follow up with....    Please also follow up with your primary care doctor.     Pain Expectations:  - pain after surgery is expected  - please take pain meds as prescribed     Medications:  - changes to home meds (ex. AED's)?  - new meds?  - pain meds?  - when can antiplatelets or anticoagulants be restarted?  - were adverse affects of meds discussed with patients?   - pain medications can cause constipation, you should eat a high fiber diet and may take a stool softener while on pain meds   - Avoid taking Advil (ibuprofen), Motrin (naproxen), or Aspirin for pain as they can cause bleeding     Call the office or come to ED if:  - wound has drainage or bleeding, increased redness or pain at incision site, neurological change, fever (>101), chills, night sweats, syncope, nausea/vomiting      Playback:  - See [facility] for a copy of your discharge paperwork     WITHIN 24 HOURS OF DISCHARGE, PLEASE CONTACT NEURO PA  WITH ANY QUESTIONS OR CONCERNS: [phone]   OTHERWISE, PLEASE CALL THE OFFICE WITH ANY QUESTIONS OR CONCERNS: [phone]

## 2023-03-09 NOTE — DISCHARGE NOTE PROVIDER - CARE PROVIDER_API CALL
Watson Wiseman)  Neurosurgery  [address], [place] [facility]  Phone: [phone]  Fax: [phone]  Follow Up Time:

## 2023-03-09 NOTE — DISCHARGE NOTE PROVIDER - NSDCCPCAREPLAN_GEN_ALL_CORE_FT
PRINCIPAL DISCHARGE DIAGNOSIS  Diagnosis: Cerebral aneurysm  Assessment and Plan of Treatment: ruptured posteiror communicating artery aneurysm

## 2023-03-10 ENCOUNTER — TRANSCRIPTION ENCOUNTER (OUTPATIENT)
Age: 33
End: 2023-03-10

## 2023-03-10 VITALS — TEMPERATURE: 98 F

## 2023-03-10 PROCEDURE — 82962 GLUCOSE BLOOD TEST: CPT

## 2023-03-10 PROCEDURE — C9399: CPT

## 2023-03-10 PROCEDURE — P9045: CPT

## 2023-03-10 PROCEDURE — C1894: CPT

## 2023-03-10 PROCEDURE — 84295 ASSAY OF SERUM SODIUM: CPT

## 2023-03-10 PROCEDURE — 82947 ASSAY GLUCOSE BLOOD QUANT: CPT

## 2023-03-10 PROCEDURE — 83935 ASSAY OF URINE OSMOLALITY: CPT

## 2023-03-10 PROCEDURE — U0005: CPT

## 2023-03-10 PROCEDURE — 84100 ASSAY OF PHOSPHORUS: CPT

## 2023-03-10 PROCEDURE — 36415 COLL VENOUS BLD VENIPUNCTURE: CPT

## 2023-03-10 PROCEDURE — 82945 GLUCOSE OTHER FLUID: CPT

## 2023-03-10 PROCEDURE — 82803 BLOOD GASES ANY COMBINATION: CPT

## 2023-03-10 PROCEDURE — 80053 COMPREHEN METABOLIC PANEL: CPT

## 2023-03-10 PROCEDURE — 83930 ASSAY OF BLOOD OSMOLALITY: CPT

## 2023-03-10 PROCEDURE — 97166 OT EVAL MOD COMPLEX 45 MIN: CPT

## 2023-03-10 PROCEDURE — C1889: CPT

## 2023-03-10 PROCEDURE — 85347 COAGULATION TIME ACTIVATED: CPT

## 2023-03-10 PROCEDURE — C1713: CPT

## 2023-03-10 PROCEDURE — 85025 COMPLETE CBC W/AUTO DIFF WBC: CPT

## 2023-03-10 PROCEDURE — 83036 HEMOGLOBIN GLYCOSYLATED A1C: CPT

## 2023-03-10 PROCEDURE — 70450 CT HEAD/BRAIN W/O DYE: CPT

## 2023-03-10 PROCEDURE — C1769: CPT

## 2023-03-10 PROCEDURE — 86901 BLOOD TYPING SEROLOGIC RH(D): CPT

## 2023-03-10 PROCEDURE — 85027 COMPLETE CBC AUTOMATED: CPT

## 2023-03-10 PROCEDURE — 87070 CULTURE OTHR SPECIMN AEROBIC: CPT

## 2023-03-10 PROCEDURE — 82330 ASSAY OF CALCIUM: CPT

## 2023-03-10 PROCEDURE — 97535 SELF CARE MNGMENT TRAINING: CPT

## 2023-03-10 PROCEDURE — 83735 ASSAY OF MAGNESIUM: CPT

## 2023-03-10 PROCEDURE — 71045 X-RAY EXAM CHEST 1 VIEW: CPT

## 2023-03-10 PROCEDURE — 97162 PT EVAL MOD COMPLEX 30 MIN: CPT

## 2023-03-10 PROCEDURE — C1760: CPT

## 2023-03-10 PROCEDURE — 84300 ASSAY OF URINE SODIUM: CPT

## 2023-03-10 PROCEDURE — 84484 ASSAY OF TROPONIN QUANT: CPT

## 2023-03-10 PROCEDURE — 86900 BLOOD TYPING SEROLOGIC ABO: CPT

## 2023-03-10 PROCEDURE — 80061 LIPID PANEL: CPT

## 2023-03-10 PROCEDURE — 85520 HEPARIN ASSAY: CPT

## 2023-03-10 PROCEDURE — 84443 ASSAY THYROID STIM HORMONE: CPT

## 2023-03-10 PROCEDURE — 85610 PROTHROMBIN TIME: CPT

## 2023-03-10 PROCEDURE — 86923 COMPATIBILITY TEST ELECTRIC: CPT

## 2023-03-10 PROCEDURE — 87205 SMEAR GRAM STAIN: CPT

## 2023-03-10 PROCEDURE — 80048 BASIC METABOLIC PNL TOTAL CA: CPT

## 2023-03-10 PROCEDURE — 89051 BODY FLUID CELL COUNT: CPT

## 2023-03-10 PROCEDURE — U0003: CPT

## 2023-03-10 PROCEDURE — 97110 THERAPEUTIC EXERCISES: CPT

## 2023-03-10 PROCEDURE — 84132 ASSAY OF SERUM POTASSIUM: CPT

## 2023-03-10 PROCEDURE — P9016: CPT

## 2023-03-10 PROCEDURE — 84157 ASSAY OF PROTEIN OTHER: CPT

## 2023-03-10 PROCEDURE — 36430 TRANSFUSION BLD/BLD COMPNT: CPT

## 2023-03-10 PROCEDURE — 85730 THROMBOPLASTIN TIME PARTIAL: CPT

## 2023-03-10 PROCEDURE — C1887: CPT

## 2023-03-10 PROCEDURE — 70496 CT ANGIOGRAPHY HEAD: CPT

## 2023-03-10 PROCEDURE — 86850 RBC ANTIBODY SCREEN: CPT

## 2023-03-10 PROCEDURE — 85018 HEMOGLOBIN: CPT

## 2023-03-10 PROCEDURE — 83605 ASSAY OF LACTIC ACID: CPT

## 2023-03-10 PROCEDURE — 70498 CT ANGIOGRAPHY NECK: CPT

## 2023-03-10 RX ORDER — LEVETIRACETAM 250 MG/1
1 TABLET, FILM COATED ORAL
Qty: 60 | Refills: 0
Start: 2023-03-10 | End: 2023-04-08

## 2023-03-10 RX ADMIN — BRIVARACETAM 50 MILLIGRAM(S): 25 TABLET, FILM COATED ORAL at 06:12

## 2023-03-10 RX ADMIN — PANTOPRAZOLE SODIUM 40 MILLIGRAM(S): 20 TABLET, DELAYED RELEASE ORAL at 06:12

## 2023-03-10 RX ADMIN — NIMODIPINE 30 MILLIGRAM(S): 60 SOLUTION ORAL at 00:04

## 2023-03-10 NOTE — CHART NOTE - NSCHARTNOTEFT_GEN_A_CORE
The patient was neurologically and medically stable. Patient refused to wait for discharge paperwork. The patients pharmacy was obtained to send medications appropriately. Case was discussed with Dr. Wiseman who recommended patient not to sign AMA form and place discharge order for home. The patient is neurologically intact and medically stable. Patient refused to wait for discharge paperwork. The patients pharmacy was obtained to send medications appropriately. Case was discussed with Dr. Wiseman who recommended patient not to sign AMA form and place discharge order for home. The patient is neurologically intact and medically stable. Patient refused to wait for discharge paperwork in addition for workup of ST elevations noted on telemetry. The patients pharmacy was obtained to send medications appropriately. Patient signed AMA document which was signed by all parties. Case was discussed with Dr. Wiseman. The Patient refused to wait for discharge paperwork in addition for workup of ST elevations noted on telemetry, no complaints chest pain were made. Neurologically intact on exam  The patients pharmacy was obtained to send medications appropriately. Patient signed AMA document which was signed/dated/timed by all parties. Case was discussed with Dr. Wiseman.

## 2023-03-10 NOTE — PROVIDER CONTACT NOTE (OTHER) - ACTION/TREATMENT ORDERED:
no interventions at this time.  Pupils size 3 brisk/reactive. pt states it went away immediately after.  Primary RN Katerin made aware.
GARO Graves came to pt's bedside and explains risks and factors . PT signed AMA form.
EKG and lab ordered. Team assess patient at bedside. Bridge pt from phenylephrine to levophed. Held Precedex and titrate off propofol when patient's paralytic wears off. 1mg atropine IVP ordered.
Per GARO Graves monitor

## 2023-03-10 NOTE — PROVIDER CONTACT NOTE (OTHER) - ASSESSMENT
pt b/P was 93/53 heart rate was 72. Pt stated " I have no chest pain".
pt sedated and vented with propofol 40mch and Precedex 0.4mcg. Pt on phenylephrine 0.4mcg gtt to keep SBP 90-120mmHg. HR 38, BP 86/47.  resp rate 12. O2 100% with 40% Fi O2. Pt's pupils remain 2mm and brisk, 4/4 TOF.
VSS, change in neuro exam
vitals stable: 115/69 (85) HR 70 NSR, 98-99% RA.  Pupils assessed by neuro PA Kimberlee, states pupils brisk/reactive size 3.

## 2023-03-10 NOTE — PROGRESS NOTE ADULT - PROVIDER SPECIALTY LIST ADULT
NSICU
Neurosurgery
NSICU
Neurosurgery
NSICU
Neurosurgery
Neurosurgery
NSICU
Neurosurgery
NSICU
Hospitalist

## 2023-03-10 NOTE — PROVIDER CONTACT NOTE (OTHER) - SITUATION
Pt received to [facility] upon report he was A&Ox4, upon assessment he was disoriented to time. Thought it was 3/8/23.
PCOMMs/p angio with failed coil embolization, OR crani pending for today
pt SBP in the 80s and salma down to 39 beats per second. pt also put ot  650 ml of urine.
pt actively getting dressed and stating he is leaving. EKG monitor reported seeing ST elevations on the monitor. PT refusing EKG and further monitoring.

## 2023-03-10 NOTE — PROVIDER CONTACT NOTE (OTHER) - BACKGROUND
pt received from OR @ 22:15 and still paralyzed.
PT had a R crani
Patient is a 31yo M . s/p R pteronial crani for R PCOMM clipping w/ intraoperative aneurysm rupture (3/1/23). Now s/p angio showing no residual aneurysm (3/3/23).

## 2023-03-10 NOTE — DISCHARGE NOTE NURSING/CASE MANAGEMENT/SOCIAL WORK - PATIENT PORTAL LINK FT
You can access the FollowMyHealth Patient Portal offered by [facility] by registering at the following website: [email or web address]. By joining [facility]’s FollowMyHealth portal, you will also be able to view your health information using other applications (apps) compatible with our system.

## 2023-03-10 NOTE — PROGRESS NOTE ADULT - SUBJECTIVE AND OBJECTIVE BOX
HPI:  Patient is a 31yo M with PMHx of anxiety who originally presented to [facility] with severe headache, nausea with emesis x 1 and diaphoresis and was found to have a right 6mm ICA aneurysm on CTA. Patient reports that this morning he woke up with a severe headache that was accompanied by an episode of nausea and vomiting. He also endorses that the headache was so severe he became extremely anxious, felt as if he was having a panic attack and became diaphoretic. He states that headaches are controlled from the pain medicine he received while at [place] and did not have any additional episodes of nausea/vomiting. Pending further workup with repeat CTH/CTA and possible cerebral angiogram     Denies visual changes, dizziness, cigarette smoking, family hx of brain aneurysm, previous recurrent headaches, changes in sensation, mental status, unilateral weakness, confusion.    GCS 15, MF0,  HH 1 (27 Feb 2023 23:14)    OVERNIGHT EVENTS: BD 12, POD 9 crani, POD 7 dx angio. Ordered 500cc bolus NS for soft pressure, patient got approx 100cc and refused the rest.     Hospital Course: 2/28: Admitted for further workup, CTH/CTA obtained, LP done, r/o xanthochromia, traumatic LP resulting in blood in all tubes. POD0 s/p cerebral angiogram with findings of 7.8mm R Pcomm aneurysm. Plan for OR in the morning for clipping of aneurysm.   3/1: NICK o/n neuro stable. POD0 R crani for R pcomm aneurysm clipping c/b intraoperative aneurysm rupture, EBL 1L. post op CTH stable. remains intubated post op sedated on propofol/precedex. on thanh gtt. hypotensive 80s, salma 30s, given 1L NS and 1mg atropine. sedation held, thanh changed to levo gtt. restarted on propofol for sedation  3/2: BD4. RBD2. POD1. neuro stable. Propofol overnight while intubated. CTH stable post-op, CTA head and neck shows clipping of R pcomm aneurysm, lack of visualization of R pcomm and R P1 segment possibly secondary to vasospasm, will correlate clinically. Pending formal angiogram tomorrow. Extubated this am. Voiding.   3/3: BD5, RBD2. POD2. NICK o/n neuro stable. 500cc bolus NS given in AM for euvolemia. POD0 angiogram showing complete clip ligation of R pcomm aneurym, no residual. Liberalized SBP goal to 120-180, given 500cc NS bolus.   3/4: BD 6, POD 3 crani for clipping and POD 1 angio. NICK o/n. 1.5L NS bolus for euvolemia. ALONA with a lot of resistance during removal attempt, fellow at bedside, removed staples to open up incision and visualize the drain, drain was removed and 4 horizontal mattress sutures were placed. Started on ancef x  48 hours, given albumin for volume status. Given lactulose and had bowel movement. SQL started this evening  3/5: BD7, POD 4 crani/clipping, POD2 dx angio. NICK overnight. Pain well controlled, no complaints at this time. Headaches controlled. SBP goal 100-180, o/n SBP dipped to 90s, asymptomatic, neuro exam remains intact, nimodipine if BP and/or HR allows. Platelets down in evening but normalized with AM labs. Pending CTA BD10. Anxiety issues overnight, keppra switched to briviact. Had episode of nausea and diaphoresis while working with OT, vital signs stable throughout, given zofran. Improved with resting and lying down.   3/6: BD8, POD5 crani/clipping, POD3. Given 1L LR bolus for euvolemia goal. Given 0.5mg dilaudid IV x1 for breakthrough headache, remains neuro intact. SBP goal 100-180. Pend CTH BD 10 (3/8). 500cc LR bolus given for euvolemia goal.   3/7: BD9, POD6 crani/clipping R PCOMM aneurysm, POD4 s/p angio. NICK overnight, started on headache cocktail and decadron taper. Started back on IVF for poor PO intake, can d/c once tolerating PO. Pend CTA BD#10, pend echo. Cont Briviact, neuro/vitals q1hr. 100mL 3% Na bolus in AM.  4pm BMP Na improved to 138 from 133. D/c'd maintenance fluids.  3/8: BD10, POD7 crani/clipping, POD5 dx angio. NICK o/n neuro stable. lovenox increased 50mg based on anti xa 0.16. CTA reading new R thalamic stroke and more pronounced narrowing of R A1 and proximal M1. Elevated wbc attributed to vasospasm  500cc NS bolus for  sbp in 80s with resolution to low 100s. Dcd maintenance fluids.   3/9: BD11, POD8 crani, POD6 dx angio. NICK o/n neuro stable. Stepdown status. Pending Echo.     Vital Signs Last 24 Hrs  T(C): 36.8 (09 Mar 2023 21:41), Max: 37.1 (09 Mar 2023 04:23)  T(F): 98.3 (09 Mar 2023 21:41), Max: 98.8 (09 Mar 2023 04:23)  HR: 66 (10 Mar 2023 00:02) (55 - 80)  BP: 114/72 (10 Mar 2023 00:02) (95/53 - 121/70)  BP(mean): 89 (10 Mar 2023 00:02) (70 - 89)  RR: 18 (09 Mar 2023 23:58) (16 - 18)  SpO2: 96% (10 Mar 2023 00:02) (95% - 99%)    Parameters below as of 09 Mar 2023 23:58  Patient On (Oxygen Delivery Method): room air        I&O's Summary    08 Mar 2023 07:01  -  09 Mar 2023 07:00  --------------------------------------------------------  IN: 1950 mL / OUT: 1725 mL / NET: 225 mL    09 Mar 2023 07:01  -  10 Mar 2023 01:30  --------------------------------------------------------  IN: 350 mL / OUT: 500 mL / NET: -150 mL        PHYSICAL EXAM:  GEN: laying in bed, NAD  NEURO: AOx3. FC, OE spont, speech intact, face symmetric. CNII-XII intact. PERRL, EOMI. No pronator drift. MAEx4. 5/5 strength throughout. SILT  CV: RRR +S1/S2  PULM: CTAB  GI: Abd soft, NT/ND  EXT: ext warm, dry, nontender  WOUND: R crani site c/d/i, staples in place    LABS:                        11.9   15.37 )-----------( 304      ( 09 Mar 2023 05:13 )             34.9     03-09    137  |  103  |  17  ----------------------------<  123<H>  4.4   |  27  |  0.72    Ca    9.4      09 Mar 2023 05:13  Phos  3.3     03-09  Mg     1.6     03-09              CAPILLARY BLOOD GLUCOSE          Drug Levels: [] N/A    CSF Analysis: [] N/A      Allergies    No Known Allergies    Intolerances      MEDICATIONS:  Antibiotics:    Neuro:  acetaminophen 300 mG/butalbital 50 mG/ caffeine 40 mG 1 Capsule(s) Oral every 6 hours PRN  brivaracetam 50 milliGRAM(s) Oral every 12 hours  ondansetron Injectable 4 milliGRAM(s) IV Push every 6 hours PRN    Anticoagulation:  enoxaparin Injectable 50 milliGRAM(s) SubCutaneous <User Schedule>    OTHER:  bisacodyl 5 milliGRAM(s) Oral every 12 hours PRN  influenza   Vaccine 0.5 milliLiter(s) IntraMuscular once  niMODipine 30 milliGRAM(s) Oral every 2 hours  pantoprazole    Tablet 40 milliGRAM(s) Oral before breakfast  polyethylene glycol 3350 17 Gram(s) Oral two times a day  senna 2 Tablet(s) Oral at bedtime    IVF:    CULTURES:  Culture Results:   No growth to date (02-28 @ 05:49)    RADIOLOGY & ADDITIONAL TESTS:    ASSESSMENT:  31 y/o male with h/o of anxiety transferred from [facility] with HA s/p angiogram with findings of R Pcomm aneurysm 2/28. s/p R pteronial crani for R PCOMM clipping w/ intraoperative aneurysm rupture (3/1/23). Now s/p angio showing no residual aneurysm (3/3/23).     PLAN   NEURO   - Neuro/vitals q4 hours   - Pain control - tramadol PRN   - Briviact 50 mg BID   - CTA 3/2: possible PCOMM vasospasm, CTA BD10 3/8 R thalamic stroke and more pronounced narrowing of R A1 and proximal M1  - Angio 3/3 negative for spasm, complete obliteration of aneurysm   - Nimodipine 30 mg q 2hrs   - s/p LP on admission: traumatic tap, inconclusive for SAH     PULM   - RA, Encourage IS     CARDIO  - -180   - pending echo    GI  - Regular diet  - Bowel regimen   - Last BM 3/4  - Zofran prn nausea      ENDO  - ISS dc'd, A1c 5.3     RENAL  - Voiding   - Euvolemia goal  - IVL    HEME  - SCDs, SQL 50    ID  - Afebrile     Dispo:   - SD status, full code, dispo pending   - PT/OT home no needs when medically ready     Assessment and plan discussed with Dr. Wiseman and Dr. Paz

## 2023-03-10 NOTE — CHART NOTE - NSCHARTNOTESELECT_GEN_ALL_CORE
3/4/23/Event Note
Event Note
Event Note
3/5/23/Event Note
Event Note
Follow Up Nutrition Assessment/Nutrition Services

## 2023-03-10 NOTE — PROGRESS NOTE ADULT - REASON FOR ADMISSION
6mm R ICA aneurysm

## 2023-03-10 NOTE — PROVIDER CONTACT NOTE (OTHER) - RECOMMENDATIONS
Titrate off precedex gtt
GARO Graves stated we get an AMA form and explain to pt the risk and factors of him leaving against medical advise.
Per GARO Graves monitor

## 2023-03-14 LAB
CULTURE RESULTS: NO GROWTH — SIGNIFICANT CHANGE UP
SPECIMEN SOURCE: SIGNIFICANT CHANGE UP

## 2023-03-15 DIAGNOSIS — G93.5 COMPRESSION OF BRAIN: ICD-10-CM

## 2023-03-15 DIAGNOSIS — I67.848 OTHER CEREBROVASCULAR VASOSPASM AND VASOCONSTRICTION: ICD-10-CM

## 2023-03-15 DIAGNOSIS — I95.9 HYPOTENSION, UNSPECIFIED: ICD-10-CM

## 2023-03-15 DIAGNOSIS — I60.31 NONTRAUMATIC SUBARACHNOID HEMORRHAGE FROM RIGHT POSTERIOR COMMUNICATING ARTERY: ICD-10-CM

## 2023-03-15 DIAGNOSIS — E87.1 HYPO-OSMOLALITY AND HYPONATREMIA: ICD-10-CM

## 2023-03-15 DIAGNOSIS — G93.2 BENIGN INTRACRANIAL HYPERTENSION: ICD-10-CM

## 2023-03-15 DIAGNOSIS — F41.9 ANXIETY DISORDER, UNSPECIFIED: ICD-10-CM

## 2023-03-15 DIAGNOSIS — R00.1 BRADYCARDIA, UNSPECIFIED: ICD-10-CM

## 2023-03-15 DIAGNOSIS — D72.829 ELEVATED WHITE BLOOD CELL COUNT, UNSPECIFIED: ICD-10-CM

## 2023-03-15 DIAGNOSIS — I63.89 OTHER CEREBRAL INFARCTION: ICD-10-CM

## 2023-03-17 ENCOUNTER — APPOINTMENT (OUTPATIENT)
Dept: NEUROSURGERY | Facility: CLINIC | Age: 33
End: 2023-03-17

## 2023-03-17 ENCOUNTER — NON-APPOINTMENT (OUTPATIENT)
Age: 33
End: 2023-03-17

## 2023-03-17 ENCOUNTER — APPOINTMENT (OUTPATIENT)
Dept: NEUROSURGERY | Facility: CLINIC | Age: 33
End: 2023-03-17
Payer: MEDICAID

## 2023-03-17 VITALS
HEIGHT: 71 IN | OXYGEN SATURATION: 98 % | RESPIRATION RATE: 17 BRPM | DIASTOLIC BLOOD PRESSURE: 78 MMHG | TEMPERATURE: 95.5 F | BODY MASS INDEX: 28.84 KG/M2 | SYSTOLIC BLOOD PRESSURE: 117 MMHG | WEIGHT: 206 LBS | HEART RATE: 97 BPM

## 2023-03-17 DIAGNOSIS — Z48.02 ENCOUNTER FOR REMOVAL OF SUTURES: ICD-10-CM

## 2023-03-17 DIAGNOSIS — Z86.79 PERSONAL HISTORY OF OTHER DISEASES OF THE CIRCULATORY SYSTEM: ICD-10-CM

## 2023-03-17 DIAGNOSIS — Z78.9 OTHER SPECIFIED HEALTH STATUS: ICD-10-CM

## 2023-03-17 DIAGNOSIS — Z51.89 ENCOUNTER FOR OTHER SPECIFIED AFTERCARE: ICD-10-CM

## 2023-03-17 DIAGNOSIS — Z09 ENCOUNTER FOR FOLLOW-UP EXAMINATION AFTER COMPLETED TREATMENT FOR CONDITIONS OTHER THAN MALIGNANT NEOPLASM: ICD-10-CM

## 2023-03-17 PROCEDURE — 99024 POSTOP FOLLOW-UP VISIT: CPT

## 2023-03-20 ENCOUNTER — TRANSCRIPTION ENCOUNTER (OUTPATIENT)
Age: 33
End: 2023-03-20

## 2023-03-20 NOTE — ASSESSMENT
[FreeTextEntry1] : \par \par Wound care instructions:\par okay to get incision wet wash with soap and water/ baby shampoo gently\par \par avoid sun exposure to the incision site for at least 3- 6 months\par No public pools/tub baths x 6-8 weeks\par Continue to increase activity as tolerated\par I have discussed red-flag symptom s in detail and he understands the importance of seeking medical attention if these do occur\par .\par An understanding was verbalized\par Continued f/u in the office in 4 weeks for progress check and evaluation.\par Re-educated regarding the continued needed for proper body mechanics\par Patient was advised to continue following up with primary care MD for any other medical conditions or concerns\par \par \par NO NSAIDs for 6 weeks postop\par \par Will decrease Keppra 500 mg BID (sent to pharmacy of choice)\par Counseled on  the use of marijuana and the potential of vasospasms an understanding was verbalized.\par \par

## 2023-03-20 NOTE — REASON FOR VISIT
[de-identified] : 1.  Right frontotemporal craniotomy.\par 2.  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  Ventriculocisternostomy of third ventricle. [de-identified] : 3/1/2023 [de-identified] : with postop angiogram on 3/3/22033 by Dr Lawson Marina with no evidence of residual clipped aneurysm incorporating the origin of the right posterior communicating artery.\par \par \par Reports doing well. He is w/o any new focal deficits\par He continues Keppra 750 mg BID denies any seizure activity\par Denies any signs of postop wound infection which could include but is not limited to redness/swelling/purulent drainage\par Denies CP/SOB/unilateral leg edema\par He is slowly introducing preop activities\par \par He offers no new symptoms/concerns today\par

## 2023-03-20 NOTE — PHYSICAL EXAM
[General Appearance - Alert] : alert [General Appearance - Well Nourished] : well nourished [General Appearance - Well-Appearing] : healthy appearing [Irregular] : irregular [Clean] : clean [Well-Healed] : well-healed [No Drainage] : without drainage [Normal Skin] : normal [Neck Appearance] : the appearance of the neck was normal [] : no respiratory distress [Exaggerated Use Of Accessory Muscles For Inspiration] : no accessory muscle use [FreeTextEntry1] : RIGHT head

## 2023-03-20 NOTE — HISTORY OF PRESENT ILLNESS
[FreeTextEntry1] : HPI: FROM HOSPITAL ADMISSIION\par Patient is a 31yo M with PMHx of anxiety who originally presented to [facility] with severe headache, nausea with emesis x 1 and diaphoresis and was found to have a right 6mm ICA aneurysm on CTA. Patient reports that this morning he woke up with a severe headache that was accompanied by an episode of nausea and vomiting. He also endorses that the headache was so severe he became extremely anxious, felt as if he was having a panic attack and became diaphoretic. He states that headaches are controlled from the pain medicine he received while at [place] and did not have any additional episodes of nausea/vomiting. Pending further workup with repeat CTH/CTA and possible cerebral angiogram \par \par Denies visual changes, dizziness, cigarette smoking, family hx of brain aneurysm, previous recurrent headaches, changes in sensation, mental status, unilateral weakness, confusion. GCS 15, MF0,  HH 1\par

## 2023-04-12 PROBLEM — Z86.59 HISTORY OF ANXIETY: Status: RESOLVED | Noted: 2023-03-16 | Resolved: 2023-04-12

## 2023-04-12 PROBLEM — F12.91 HISTORY OF MARIJUANA USE: Status: ACTIVE | Noted: 2023-03-16

## 2023-04-12 PROBLEM — Z98.890 S/P CRANIOTOMY: Status: ACTIVE | Noted: 2023-03-17

## 2023-04-17 ENCOUNTER — APPOINTMENT (OUTPATIENT)
Dept: NEUROSURGERY | Facility: CLINIC | Age: 33
End: 2023-04-17
Payer: MEDICAID

## 2023-04-17 VITALS
BODY MASS INDEX: 32.48 KG/M2 | HEIGHT: 71 IN | SYSTOLIC BLOOD PRESSURE: 119 MMHG | WEIGHT: 232 LBS | HEART RATE: 80 BPM | RESPIRATION RATE: 17 BRPM | TEMPERATURE: 97.3 F | DIASTOLIC BLOOD PRESSURE: 77 MMHG | OXYGEN SATURATION: 99 %

## 2023-04-17 DIAGNOSIS — F12.91 CANNABIS USE, UNSPECIFIED, IN REMISSION: ICD-10-CM

## 2023-04-17 DIAGNOSIS — Z98.890 OTHER SPECIFIED POSTPROCEDURAL STATES: ICD-10-CM

## 2023-04-17 DIAGNOSIS — Z86.59 PERSONAL HISTORY OF OTHER MENTAL AND BEHAVIORAL DISORDERS: ICD-10-CM

## 2023-04-17 PROCEDURE — 99024 POSTOP FOLLOW-UP VISIT: CPT

## 2023-04-20 NOTE — PHYSICAL EXAM
[General Appearance - Alert] : alert [General Appearance - In No Acute Distress] : in no acute distress [General Appearance - Well Nourished] : well nourished [General Appearance - Well-Appearing] : healthy appearing [Transverse] : transverse [Well-Healed] : well-healed [No Drainage] : without drainage [Normal Skin] : normal [Oriented To Time, Place, And Person] : oriented to person, place, and time [Impaired Insight] : insight and judgment were intact [Affect] : the affect was normal [Memory Recent] : recent memory was not impaired [Person] : oriented to person [Place] : oriented to place [Time] : oriented to time [Short Term Intact] : short term memory intact [Remote Intact] : remote memory intact [Registration Intact] : recent registration memory intact [Span Intact] : the attention span was normal [Concentration Intact] : normal concentrating ability [Fluency] : fluency intact [Comprehension] : comprehension intact [Reading] : reading intact [Current Events] : adequate knowledge of current events [Past History] : adequate knowledge of personal past history [Vocabulary] : adequate range of vocabulary [Motor Tone] : muscle tone was normal in all four extremities [Motor Strength] : muscle strength was normal in all four extremities [FreeTextEntry1] : R frontal head

## 2023-04-20 NOTE — HISTORY OF PRESENT ILLNESS
[FreeTextEntry1] : HPI: FROM HOSPITAL ADMISSIION\par Patient is a 31yo M with PMHx of anxiety who originally presented to [facility] with severe headache, nausea with emesis x 1 and diaphoresis and was found to have a right 6mm ICA aneurysm on CTA. Patient reports that this morning he woke up with a severe headache that was accompanied by an episode of nausea and vomiting. He also endorses that the headache was so severe he became extremely anxious, felt as if he was having a panic attack and became diaphoretic. He states that headaches are controlled from the pain medicine he received while at [place] and did not have any additional episodes of nausea/vomiting. Pending further workup with repeat CTH/CTA and possible cerebral angiogram \par \par Denies visual changes, dizziness, cigarette smoking, family hx of brain aneurysm, previous recurrent headaches, changes in sensation, mental status, unilateral weakness, confusion. GCS 15, MF0,  HH 1\par \par 3/17/23 post op visit\par Reports doing well. He is w/o any new focal deficits\par He continues Keppra 750 mg BID denies any seizure activity\par Denies any signs of postop wound infection which could include but is not limited to redness/swelling/purulent drainage\par Denies CP/SOB/unilateral leg edema\par He is slowly introducing preop activities\par \par He offers no new symptoms/concerns today\par

## 2023-04-20 NOTE — REASON FOR VISIT
[Spouse] : spouse [de-identified] : 1.  Right frontotemporal craniotomy.\par 2.  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  Ventriculocisternostomy of third ventricle. [de-identified] : 3/1/2023 [de-identified] : with postop angiogram on 3/3/62221 by Dr Lawson Marina with no evidence of residual clipped aneurysm incorporating the origin of the right posterior communicating artery.\par Today he reports doing well with incision numbness but denies seizure activity, headache, weakness or any other focal neuro deficits. \par \par \par

## 2023-04-20 NOTE — ASSESSMENT
[FreeTextEntry1] : PLAN\par - take Keppra 500mg once daily for 5 days then discontinue\par - repeat CT angio head w/wo in 3 months\par - f/u after CTA

## 2023-07-07 PROBLEM — F41.9 ANXIETY DISORDER, UNSPECIFIED: Chronic | Status: ACTIVE | Noted: 2023-02-27

## 2023-07-10 ENCOUNTER — OUTPATIENT (OUTPATIENT)
Dept: OUTPATIENT SERVICES | Facility: HOSPITAL | Age: 33
LOS: 1 days | End: 2023-07-10

## 2023-07-10 ENCOUNTER — APPOINTMENT (OUTPATIENT)
Dept: CT IMAGING | Facility: CLINIC | Age: 33
End: 2023-07-10
Payer: MEDICAID

## 2023-07-10 PROCEDURE — 70496 CT ANGIOGRAPHY HEAD: CPT | Mod: 26

## 2023-07-25 ENCOUNTER — NON-APPOINTMENT (OUTPATIENT)
Age: 33
End: 2023-07-25

## 2023-07-31 RX ORDER — LEVETIRACETAM 500 MG/1
500 TABLET, FILM COATED ORAL
Qty: 60 | Refills: 1 | Status: DISCONTINUED | COMMUNITY
Start: 2023-03-17 | End: 2023-07-31

## 2023-08-01 ENCOUNTER — APPOINTMENT (OUTPATIENT)
Dept: NEUROSURGERY | Facility: CLINIC | Age: 33
End: 2023-08-01
Payer: MEDICAID

## 2023-08-01 DIAGNOSIS — I67.1 CEREBRAL ANEURYSM, NONRUPTURED: ICD-10-CM

## 2023-08-01 PROCEDURE — 99214 OFFICE O/P EST MOD 30 MIN: CPT | Mod: 95

## 2023-08-01 RX ORDER — ACETAMINOPHEN 500 MG/1
500 TABLET, COATED ORAL
Refills: 0 | Status: DISCONTINUED | COMMUNITY
End: 2023-08-01

## 2023-08-01 NOTE — DATA REVIEWED
[de-identified] : 	 EXAM: [id number] - CT ANGIO BRAIN (W)AW IC  - ORDERED BY: ELIESER GIRON   PROCEDURE DATE:  07/10/2023    INTERPRETATION:  PROCEDURE: CTA brain with intravenous contrast.  INDICATION: Right P-comm aneurysm clipping, follow-up  TECHNIQUE: Multiple axial thin section were obtained through the [place] following the intravenous bolus injection of 100 mL Omnipaque 350. MIP series are provided.  COMPARISON: CTA brain 3/8/2023 and 2/28/2023  FINDINGS: The internal carotid arteries are patent at the skull base and intracranial compartment without occlusion or high grade stenosis. The anterior and middle cerebral arteries are patent at their 1st and 2nd order segments, and appear symmetric caliber. The posterior circulation shows no high grade stenosis or occlusion. The intracranial vertebral arteries, the basilar artery and both posterior cerebral arteries are patent.  Status post right pterional craniotomy for right posterior communicating artery aneurysm clipping. There is no evidence of residual or recurrent aneurysm. There is mild artifact from the aneurysm clip. There is no new aneurysm.  IMPRESSION: Status post clipping of right posterior communicating artery aneurysm. No evidence of residual or recurrent aneurysm.  --- End of Report ---       AMANDA MILLER MD; Attending Radiologist This document has been electronically signed. Jul 12 2023  3:55PM

## 2023-08-01 NOTE — REASON FOR VISIT
[Follow-Up: _____] : a [unfilled] follow-up visit [Spouse] : spouse [Home] : at home, [unfilled] , at the time of the visit. [Medical Office: ([facility])___] : at the medical office located in  [Patient] : the patient [FreeTextEntry1] : He was tapered off keppra at prior visit.  Postoperative catheter angiogram demonstrated no residual aneurysm.

## 2023-08-01 NOTE — ASSESSMENT
[FreeTextEntry1] : CTA head with contrast done 7/10/23 reviewed by Dr. Wiseman with patient demonstrates no residual aneurysm. Recommend CTA head in 5 years. Advised to call in 5 years to schedule testing  Patient and patient's family verbalize agreement and understanding with plan of care.

## 2023-08-01 NOTE — HISTORY OF PRESENT ILLNESS
[FreeTextEntry1] : 32 year old man who presented with aneurysmal SAH. CT head was negative but was found to have blood in CSF after spinal tap. Catheter cerebral angiogram demonstrated a right sided ruptured complex posterior communicating artery aneurysm.   He underwent right craniotomy for clipping of cerebral aneurysm on 3/1/23.   He had CTA head without contrast done on 7/10/23 which demonstrated no residual aneurysm. He states he is doing well. Denies headaches, nausea/vomiting, changes in vision/speech.

## 2023-08-01 NOTE — ADDENDUM
[FreeTextEntry1] : Patient doing well. Back to all previous activities without complaint. CTA head looks good. Plan for followup CTA head in 5 years.

## (undated) DEVICE — STAPLER SKIN PROXIMATE

## (undated) DEVICE — PACK CRANIOTOMY LNX SURGICOUNT

## (undated) DEVICE — ARACHNOID BACKCUTTING LONG HANDLE 8"

## (undated) DEVICE — WARMING BLANKET FULL UNDERBODY

## (undated) DEVICE — MIDAS REX MR8 BALL FLUTED LG BORE 5MM X 9CM

## (undated) DEVICE — BIPOLAR FORCEP KOGENT IRRIGATING STRAIGHT 0.5MM X 7" DISP

## (undated) DEVICE — MINI DOPPLER PROBE

## (undated) DEVICE — LONE STAR RETRACTOR RING 12MM BLUNT DISP

## (undated) DEVICE — SPONGE SURGICAL STRIP 1" X 6"

## (undated) DEVICE — Device

## (undated) DEVICE — DRAPE LIGHT HANDLE COVER (GREEN)

## (undated) DEVICE — DRAPE MICROSCOPE EXOSCOPE 12" X 79"

## (undated) DEVICE — MIDAS REX MR8 TAPERED SM BORE 1.MM X 7CM

## (undated) DEVICE — TUBING SMOKE EVAC 3/8" X 10FT FOR NEPTUNE

## (undated) DEVICE — GLV 8.5 PROTEXIS (WHITE)

## (undated) DEVICE — SUT VICRYL PLUS 2-0 18" CT-2 (POP-OFF)

## (undated) DEVICE — SUT NUROLON 4-0 8-18" TF (POP-OFF)

## (undated) DEVICE — ELCTR STRYKER NEPTUNE SMOKE EVACUATION PENCIL (GREEN)

## (undated) DEVICE — MIDAS REX LEGEND BALL FLUTED LG BORE 5.0MM X 9CM

## (undated) DEVICE — MARKING PEN W RULER

## (undated) DEVICE — CODMAN PERFORATOR 14MM (BLUE)

## (undated) DEVICE — SUT ETHILON 2-0 18" PS

## (undated) DEVICE — VENODYNE/SCD SLEEVE CALF MEDIUM

## (undated) DEVICE — DRAIN JACKSON PRATT 7MM FLAT FULL NO TROCAR

## (undated) DEVICE — TUBING SUCTION 20FT

## (undated) DEVICE — DRAPE MICROSCOPE LEICA MINI

## (undated) DEVICE — RUBBERBAND STRL LTX FR 200/CA 3X1/8IN

## (undated) DEVICE — NDL SPINAL 18G X 3.5" (PINK)

## (undated) DEVICE — AESCULAP SCALPFIX 10 CLIPS

## (undated) DEVICE — DRAPE MAYO STAND 30"

## (undated) DEVICE — ARACHNOID CIRCULAR SUPERFICIAL HANDLE 5"

## (undated) DEVICE — SUT VICRYL PLUS 2-0 18" CT-1 (POP-OFF)

## (undated) DEVICE — SPONGE SURGICAL STRIP 1/4 X 6"

## (undated) DEVICE — GLV 8 PROTEXIS (WHITE)

## (undated) DEVICE — APPLICATOR SKIN PREP CHG 3CC

## (undated) DEVICE — MIDAS REX MR8 BALL FLUTED LG BORE 4MM X 9CM

## (undated) DEVICE — MIDAS REX LEGEND TAPERED SM BORE 2.3MM X 8CM

## (undated) DEVICE — SPONGE SURGICAL STRIP 1/2 X 6"

## (undated) DEVICE — DRAPE INSTRUMENT POUCH 6.75" X 11"

## (undated) DEVICE — GLV 7.5 PROTEXIS (WHITE)

## (undated) DEVICE — ELCTR GROUNDING PAD ADULT COVIDIEN

## (undated) DEVICE — MIDAS REX MR8 TAPERED SM BORE 2.3MM X 7CM FOOTED